# Patient Record
Sex: MALE | Race: WHITE | NOT HISPANIC OR LATINO | Employment: FULL TIME | URBAN - METROPOLITAN AREA
[De-identification: names, ages, dates, MRNs, and addresses within clinical notes are randomized per-mention and may not be internally consistent; named-entity substitution may affect disease eponyms.]

---

## 2023-08-29 ENCOUNTER — HOSPITAL ENCOUNTER (OUTPATIENT)
Dept: DATA CONVERSION | Facility: HOSPITAL | Age: 63
Discharge: HOME | End: 2023-08-29
Payer: COMMERCIAL

## 2023-08-29 ENCOUNTER — HOSPITAL ENCOUNTER (OUTPATIENT)
Dept: DATA CONVERSION | Facility: HOSPITAL | Age: 63
End: 2023-08-29

## 2023-08-29 DIAGNOSIS — I25.119 ATHEROSCLEROTIC HEART DISEASE OF NATIVE CORONARY ARTERY WITH UNSPECIFIED ANGINA PECTORIS (CMS-HCC): ICD-10-CM

## 2023-08-29 LAB
ABO + RH BLD: NORMAL
ALBUMIN SERPL-MCNC: 4.5 GM/DL (ref 3.5–5)
ALBUMIN/GLOB SERPL: 1.5 RATIO (ref 1.5–3)
ALP BLD-CCNC: 64 U/L (ref 35–125)
ALT SERPL-CCNC: 15 U/L (ref 5–40)
ANION GAP SERPL CALCULATED.3IONS-SCNC: 12 MMOL/L (ref 0–19)
ANTICOAGULANT: NORMAL
APTT PPP: 27.2 SEC (ref 22–32.5)
AST SERPL-CCNC: 21 U/L (ref 5–40)
BACTERIA UR QL AUTO: NEGATIVE
BILIRUB SERPL-MCNC: 0.5 MG/DL (ref 0.1–1.2)
BILIRUB UR QL STRIP.AUTO: NEGATIVE
BLD GP AB SCN SERPL QL: NEGATIVE
BLD PROD TYP BPU: NORMAL
BPU ID: NORMAL
BPU ID: NORMAL
BUN SERPL-MCNC: 12 MG/DL (ref 8–25)
BUN/CREAT SERPL: 17.1 RATIO (ref 8–21)
CALCIUM SERPL-MCNC: 9.7 MG/DL (ref 8.5–10.4)
CHLORIDE SERPL-SCNC: 105 MMOL/L (ref 97–107)
CLARITY UR: CLEAR
CO2 SERPL-SCNC: 23 MMOL/L (ref 24–31)
COLOR UR: ABNORMAL
CREAT SERPL-MCNC: 0.7 MG/DL (ref 0.4–1.6)
DEPRECATED RDW RBC AUTO: 44.7 FL (ref 37–54)
ERYTHROCYTE [DISTWIDTH] IN BLOOD BY AUTOMATED COUNT: 12 % (ref 11.7–15)
GFR SERPL CREATININE-BSD FRML MDRD: 105 ML/MIN/1.73 M2
GLOBULIN SER-MCNC: 3.1 G/DL (ref 1.9–3.7)
GLUCOSE SERPL-MCNC: 100 MG/DL (ref 65–99)
GLUCOSE UR STRIP.AUTO-MCNC: NEGATIVE MG/DL
HBA1C MFR BLD: 5.4 % (ref 4–6)
HCT VFR BLD AUTO: 46.4 % (ref 41–50)
HGB BLD-MCNC: 15.6 GM/DL (ref 13.5–16.5)
HGB UR QL STRIP.AUTO: ABNORMAL /HPF (ref 0–3)
HGB UR QL: NEGATIVE
HX OF BLOOD TRANSFUSION: NORMAL
HYALINE CASTS UR QL AUTO: ABNORMAL /LPF
INR PPP: 1 (ref 0.86–1.16)
KETONES UR QL STRIP.AUTO: NEGATIVE
LEUKOCYTE ESTERASE UR QL STRIP.AUTO: NEGATIVE
MAJ XM SERPL-IMP: NORMAL
MAJ XM SERPL-IMP: NORMAL
MCH RBC QN AUTO: 33.6 PG (ref 26–34)
MCHC RBC AUTO-ENTMCNC: 33.6 % (ref 31–37)
MCV RBC AUTO: 100 FL (ref 80–100)
MICROSCOPIC (UA): ABNORMAL
MRSA DNA SPEC QL NAA+PROBE: NEGATIVE
NITRITE UR QL STRIP.AUTO: NEGATIVE
NRBC BLD-RTO: 0 /100 WBC
NUM BPU REQUESTED: 2
PH UR STRIP.AUTO: 7.5 [PH] (ref 4.6–8)
PLATELET # BLD AUTO: 221 K/UL (ref 150–450)
PMV BLD AUTO: 10.1 CU (ref 7–12.6)
POTASSIUM SERPL-SCNC: 5.1 MMOL/L (ref 3.4–5.1)
PROT SERPL-MCNC: 7.6 G/DL (ref 5.9–7.9)
PROT UR STRIP.AUTO-MCNC: ABNORMAL MG/DL
PROTHROMBIN TIME: 10.7 SEC (ref 9.3–12.7)
RBC # BLD AUTO: 4.64 M/UL (ref 4.5–5.5)
SODIUM SERPL-SCNC: 140 MMOL/L (ref 133–145)
SP GR UR STRIP.AUTO: 1.02 (ref 1–1.03)
SPECIMEN EXP DATE BLD: NORMAL
SPECIMEN SOURCE: NORMAL
SQUAMOUS UR QL AUTO: ABNORMAL /HPF
STATUS OF UNIT: NORMAL
STATUS OF UNIT: NORMAL
SURGERY DATE: NORMAL
TEST ORDERED: NORMAL
TRANSF BAND NUM PATIENT: 1110
TRANSFUSION STATUS: NORMAL
TRANSFUSION STATUS: NORMAL
UNIT DIVISION: 0
UNIT DIVISION: 0
URINE CULTURE: ABNORMAL
UROBILINOGEN UR QL STRIP.AUTO: NORMAL MG/DL (ref 0–1)
WBC # BLD AUTO: 8.5 K/UL (ref 4.5–11)
WBC #/AREA URNS AUTO: ABNORMAL /HPF (ref 0–3)

## 2023-09-04 PROBLEM — I25.10 CORONARY ATHEROSCLEROSIS: Status: ACTIVE | Noted: 2023-09-04

## 2023-09-04 PROBLEM — I25.10 CORONARY ATHEROSCLEROSIS OF NATIVE CORONARY ARTERY: Status: ACTIVE | Noted: 2023-09-04

## 2023-09-04 PROBLEM — E78.5 HYPERLIPIDEMIA: Status: ACTIVE | Noted: 2023-09-04

## 2023-09-04 PROBLEM — F17.200 TOBACCO DEPENDENCE: Status: ACTIVE | Noted: 2023-09-04

## 2023-09-04 PROBLEM — I11.9 BENIGN HYPERTENSIVE HEART DISEASE WITHOUT CONGESTIVE HEART FAILURE: Status: ACTIVE | Noted: 2023-09-04

## 2023-09-04 PROBLEM — Z98.61 POSTSURGICAL PERCUTANEOUS TRANSLUMINAL CORONARY ANGIOPLASTY STATUS: Status: ACTIVE | Noted: 2023-09-04

## 2023-09-04 RX ORDER — ASPIRIN 325 MG
325 TABLET ORAL DAILY
COMMUNITY
End: 2023-10-31 | Stop reason: ALTCHOICE

## 2023-09-04 RX ORDER — NAPROXEN 500 MG/1
500 TABLET ORAL EVERY 12 HOURS
COMMUNITY
End: 2023-10-31 | Stop reason: ALTCHOICE

## 2023-09-04 RX ORDER — TRAMADOL HYDROCHLORIDE 50 MG/1
50 TABLET ORAL EVERY 6 HOURS PRN
COMMUNITY

## 2023-09-04 RX ORDER — IBUPROFEN 800 MG/1
800 TABLET ORAL 3 TIMES DAILY PRN
COMMUNITY
Start: 2018-07-09 | End: 2023-10-31 | Stop reason: ALTCHOICE

## 2023-09-04 RX ORDER — METHOCARBAMOL 500 MG/1
500 TABLET, FILM COATED ORAL 3 TIMES DAILY PRN
COMMUNITY
End: 2023-10-31 | Stop reason: ALTCHOICE

## 2023-09-30 PROBLEM — E78.5 HYPERLIPIDEMIA: Status: ACTIVE | Noted: 2023-09-30

## 2023-09-30 PROBLEM — I10 ESSENTIAL HYPERTENSION: Status: ACTIVE | Noted: 2023-09-30

## 2023-09-30 PROBLEM — I20.9 ANGINA PECTORIS (CMS-HCC): Status: ACTIVE | Noted: 2023-09-30

## 2023-09-30 PROBLEM — I25.10 CORONARY ARTERY DISEASE: Status: ACTIVE | Noted: 2023-09-30

## 2023-09-30 PROBLEM — I50.9 CONGESTIVE HEART FAILURE (MULTI): Status: ACTIVE | Noted: 2023-09-30

## 2023-10-05 DIAGNOSIS — Z95.1 S/P CABG (CORONARY ARTERY BYPASS GRAFT): Primary | ICD-10-CM

## 2023-10-06 ENCOUNTER — TELEPHONE (OUTPATIENT)
Dept: CARDIAC SURGERY | Facility: CLINIC | Age: 63
End: 2023-10-06
Payer: COMMERCIAL

## 2023-10-06 NOTE — TELEPHONE ENCOUNTER
Patient scheduled for appointment with Dr. Bull Travis 10/10/23 1300; EKG 10/9/23 1300; cxr anytime at [facility], [place]. Pt aware. Discussed verbally.

## 2023-10-09 ENCOUNTER — HOSPITAL ENCOUNTER (OUTPATIENT)
Dept: RADIOLOGY | Facility: HOSPITAL | Age: 63
Discharge: HOME | End: 2023-10-09
Payer: COMMERCIAL

## 2023-10-09 ENCOUNTER — HOSPITAL ENCOUNTER (OUTPATIENT)
Dept: CARDIOLOGY | Facility: HOSPITAL | Age: 63
Discharge: HOME | End: 2023-10-09
Payer: COMMERCIAL

## 2023-10-09 DIAGNOSIS — R93.89 ABNORMAL X-RAY: Primary | ICD-10-CM

## 2023-10-09 DIAGNOSIS — Z95.1 S/P CABG (CORONARY ARTERY BYPASS GRAFT): ICD-10-CM

## 2023-10-09 PROCEDURE — 71046 X-RAY EXAM CHEST 2 VIEWS: CPT | Mod: FY

## 2023-10-09 PROCEDURE — 93005 ELECTROCARDIOGRAM TRACING: CPT

## 2023-10-09 RX ORDER — POTASSIUM CHLORIDE 20 MEQ/1
TABLET, EXTENDED RELEASE ORAL
COMMUNITY
Start: 2023-09-12 | End: 2023-10-31 | Stop reason: ALTCHOICE

## 2023-10-09 RX ORDER — NITROGLYCERIN 0.4 MG/1
0.4 TABLET SUBLINGUAL EVERY 5 MIN PRN
COMMUNITY
Start: 2023-07-08 | End: 2023-10-31 | Stop reason: SDUPTHER

## 2023-10-09 RX ORDER — AMIODARONE HYDROCHLORIDE 200 MG/1
200 TABLET ORAL DAILY
COMMUNITY
Start: 2023-09-12 | End: 2023-10-12 | Stop reason: SDUPTHER

## 2023-10-09 RX ORDER — DM/P-EPHED/ACETAMINOPH/DOXYLAM 30-7.5/3
2 LIQUID (ML) ORAL
COMMUNITY
Start: 2023-08-02 | End: 2024-03-26 | Stop reason: WASHOUT

## 2023-10-09 RX ORDER — IBUPROFEN 200 MG
TABLET ORAL EVERY 24 HOURS
COMMUNITY
Start: 2023-07-08 | End: 2023-10-31 | Stop reason: ALTCHOICE

## 2023-10-09 RX ORDER — FUROSEMIDE 20 MG/1
20 TABLET ORAL DAILY
COMMUNITY
Start: 2023-09-12 | End: 2023-10-31 | Stop reason: ALTCHOICE

## 2023-10-09 RX ORDER — CHLORHEXIDINE GLUCONATE ORAL RINSE 1.2 MG/ML
SOLUTION DENTAL
COMMUNITY
Start: 2023-08-29 | End: 2023-10-31 | Stop reason: ALTCHOICE

## 2023-10-09 RX ORDER — LOSARTAN POTASSIUM 25 MG/1
25 TABLET ORAL 2 TIMES DAILY
COMMUNITY
End: 2023-10-31 | Stop reason: SDUPTHER

## 2023-10-09 RX ORDER — TICAGRELOR 90 MG/1
90 TABLET ORAL 2 TIMES DAILY
COMMUNITY
End: 2023-10-31 | Stop reason: SDUPTHER

## 2023-10-09 RX ORDER — ASPIRIN 81 MG/1
81 TABLET ORAL DAILY
COMMUNITY
Start: 2023-10-06 | End: 2023-10-31 | Stop reason: SDUPTHER

## 2023-10-09 RX ORDER — METOPROLOL SUCCINATE 25 MG/1
50 TABLET, EXTENDED RELEASE ORAL EVERY 24 HOURS
COMMUNITY
End: 2023-10-12 | Stop reason: SDUPTHER

## 2023-10-09 RX ORDER — ATORVASTATIN CALCIUM 80 MG/1
80 TABLET, FILM COATED ORAL DAILY
COMMUNITY
End: 2023-10-31 | Stop reason: SDUPTHER

## 2023-10-09 RX ORDER — OXYCODONE HYDROCHLORIDE 5 MG/1
5 TABLET ORAL EVERY 6 HOURS PRN
COMMUNITY
Start: 2023-09-12 | End: 2023-10-31 | Stop reason: ALTCHOICE

## 2023-10-10 ENCOUNTER — OFFICE VISIT (OUTPATIENT)
Dept: CARDIAC SURGERY | Facility: CLINIC | Age: 63
End: 2023-10-10
Payer: COMMERCIAL

## 2023-10-10 ENCOUNTER — HOSPITAL ENCOUNTER (OUTPATIENT)
Dept: RADIOLOGY | Facility: HOSPITAL | Age: 63
Discharge: HOME | End: 2023-10-10
Payer: COMMERCIAL

## 2023-10-10 VITALS
WEIGHT: 136 LBS | BODY MASS INDEX: 21.35 KG/M2 | OXYGEN SATURATION: 99 % | SYSTOLIC BLOOD PRESSURE: 122 MMHG | RESPIRATION RATE: 18 BRPM | HEIGHT: 67 IN | HEART RATE: 90 BPM | DIASTOLIC BLOOD PRESSURE: 70 MMHG

## 2023-10-10 DIAGNOSIS — I70.90 ARTERIOSCLEROSIS: Primary | ICD-10-CM

## 2023-10-10 DIAGNOSIS — R93.89 ABNORMAL X-RAY: ICD-10-CM

## 2023-10-10 PROCEDURE — 99213 OFFICE O/P EST LOW 20 MIN: CPT | Mod: ZK | Performed by: NURSE PRACTITIONER

## 2023-10-10 PROCEDURE — 71046 X-RAY EXAM CHEST 2 VIEWS: CPT | Mod: FY

## 2023-10-10 PROCEDURE — 99213 OFFICE O/P EST LOW 20 MIN: CPT | Performed by: NURSE PRACTITIONER

## 2023-10-10 PROCEDURE — 1036F TOBACCO NON-USER: CPT | Performed by: NURSE PRACTITIONER

## 2023-10-10 ASSESSMENT — LIFESTYLE VARIABLES
HOW OFTEN DO YOU HAVE A DRINK CONTAINING ALCOHOL: 2-3 TIMES A WEEK
AUDIT-C TOTAL SCORE: 3
SKIP TO QUESTIONS 9-10: 1
HAS A RELATIVE, FRIEND, DOCTOR, OR ANOTHER HEALTH PROFESSIONAL EXPRESSED CONCERN ABOUT YOUR DRINKING OR SUGGESTED YOU CUT DOWN: NO
HOW OFTEN DO YOU HAVE SIX OR MORE DRINKS ON ONE OCCASION: NEVER
HOW MANY STANDARD DRINKS CONTAINING ALCOHOL DO YOU HAVE ON A TYPICAL DAY: 1 OR 2
AUDIT TOTAL SCORE: 3
HAVE YOU OR SOMEONE ELSE BEEN INJURED AS A RESULT OF YOUR DRINKING: NO

## 2023-10-10 ASSESSMENT — PATIENT HEALTH QUESTIONNAIRE - PHQ9
SUM OF ALL RESPONSES TO PHQ9 QUESTIONS 1 & 2: 0
2. FEELING DOWN, DEPRESSED OR HOPELESS: NOT AT ALL
1. LITTLE INTEREST OR PLEASURE IN DOING THINGS: NOT AT ALL

## 2023-10-10 ASSESSMENT — ENCOUNTER SYMPTOMS
DEPRESSION: 0
LOSS OF SENSATION IN FEET: 0
OCCASIONAL FEELINGS OF UNSTEADINESS: 0

## 2023-10-10 NOTE — PROGRESS NOTES
"Routine Follow up    HPI:   Mr. Woodall is a 61 yo male who originally presented with severe substernal crushing chest pain accompanied by diaphoresis. He called 911 and brought to [facility]. He was diagnosed with an inferior STEMI. Cardiac cath revealed multivessel disease. A ANISH was placed to the RCA. DAPT was started. Echo showed an EF 35 to 40%. On 9/7/23 underwent MidCAB x2 with LIMA to LAD, FRIMA Y from LIMA to OM. His postop course was unremarkable and discharged on POD #5.     Past Medical History:   Diagnosis Date    Coronary artery disease     High blood cholesterol     HTN (hypertension)     MI (myocardial infarction) (CMS/[facility]) 2010    stents placed by Dr. Davila    MI (myocardial infarction) (CMS/[facility])     stents x2 by Dr. Salazar     Past Surgical History:   Procedure Laterality Date    CORONARY ARTERY BYPASS GRAFT      MINI cabg x2    ORTHOPEDIC SURGERY      right ankle repair    ORTHOPEDIC SURGERY      right arm repair     Family History   Problem Relation Name Age of Onset    Other (old age) Mother      Other (cabg x3) Father      Prostate cancer Father           Objective   Well appearing, in good spirits    Physical Exam  General: no distress.  /70   Pulse 90   Resp 18   Ht 1.702 m (5' 7\")   Wt 61.7 kg (136 lb)   SpO2 99%   BMI 21.30 kg/m²    Body mass index is 21.3 kg/m².   HEENT: Normocephalic and atraumatic. PERRLA. EOMs are full.   NECK: Supple without thyromegaly, masses, or carotid bruits.  CHEST: Clear.  HEART: Regular rate and rhythm.  ABDOMEN: Soft, flat, nontender without organomegaly or masses.  NEUROLOGIC: Unremarkable.  EXTREMITIES: Unremarkable. Pedal pulses are palpable.  MidCAB incision healing well    Data Review: CXR:  normal      Assessment/Plan   Overall feeling well. Follow up PRN with our service. Long term follow up with cards and IM.     Problem List Items Addressed This Visit    None      No orders of the defined types were placed in this encounter.    "

## 2023-10-12 DIAGNOSIS — I11.9 BENIGN HYPERTENSIVE HEART DISEASE WITHOUT CONGESTIVE HEART FAILURE: ICD-10-CM

## 2023-10-12 DIAGNOSIS — I48.91 ATRIAL FIBRILLATION, UNSPECIFIED TYPE (MULTI): ICD-10-CM

## 2023-10-12 RX ORDER — METOPROLOL SUCCINATE 50 MG/1
50 TABLET, EXTENDED RELEASE ORAL DAILY
Qty: 90 TABLET | Refills: 2 | Status: SHIPPED | OUTPATIENT
Start: 2023-10-12 | End: 2023-10-31 | Stop reason: SDUPTHER

## 2023-10-12 RX ORDER — AMIODARONE HYDROCHLORIDE 200 MG/1
200 TABLET ORAL DAILY
Qty: 90 TABLET | Refills: 2 | Status: SHIPPED | OUTPATIENT
Start: 2023-10-12 | End: 2023-10-31 | Stop reason: SDUPTHER

## 2023-10-12 NOTE — TELEPHONE ENCOUNTER
Requested Prescriptions     Pending Prescriptions Disp Refills    amiodarone (Pacerone) 200 mg tablet 90 tablet 2     Sig: Take 1 tablet (200 mg) by mouth once daily.    metoprolol succinate XL (Toprol-XL) 50 mg 24 hr tablet 90 tablet 2     Sig: Take 1 tablet (50 mg) by mouth once daily.     Please send the attached prescription for the patient. They were just seen in the hospital. Thank you.

## 2023-10-30 PROBLEM — F10.20 ALCOHOL DEPENDENCE (MULTI): Status: ACTIVE | Noted: 2023-10-30

## 2023-10-30 PROBLEM — I50.22 CHRONIC SYSTOLIC HEART FAILURE (MULTI): Status: ACTIVE | Noted: 2023-10-30

## 2023-10-30 PROBLEM — Z98.61 CORONARY ANGIOPLASTY STATUS: Status: ACTIVE | Noted: 2023-07-07

## 2023-10-30 PROBLEM — R07.9 CHEST PAIN: Status: ACTIVE | Noted: 2023-09-30
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
## 2023-10-30 PROBLEM — R40.1 CLOUDED CONSCIOUSNESS: Status: ACTIVE | Noted: 2023-10-30

## 2023-10-30 PROBLEM — S39.92XA BACK INJURY: Status: ACTIVE | Noted: 2023-10-30

## 2023-10-30 PROBLEM — S22.39XA FRACTURE OF RIB: Status: ACTIVE | Noted: 2023-10-30

## 2023-10-30 PROBLEM — I25.10 CAD (CORONARY ARTERY DISEASE): Status: ACTIVE | Noted: 2023-10-30

## 2023-10-30 PROBLEM — Z72.0 TOBACCO USE: Status: ACTIVE | Noted: 2023-07-31

## 2023-10-30 PROBLEM — I21.9 ACUTE MYOCARDIAL INFARCTION (MULTI): Status: ACTIVE | Noted: 2023-07-07

## 2023-10-30 PROBLEM — M54.50 ACUTE LOW BACK PAIN: Status: ACTIVE | Noted: 2023-10-30

## 2023-10-30 PROBLEM — R10.9 ABDOMINAL PAIN: Status: ACTIVE | Noted: 2023-10-30

## 2023-10-30 PROBLEM — T82.855A STENOSIS OF CORONARY ARTERY STENT: Status: ACTIVE | Noted: 2023-07-07

## 2023-10-30 PROBLEM — I25.10 CORONARY ARTERIOSCLEROSIS IN NATIVE ARTERY: Status: ACTIVE | Noted: 2023-07-07

## 2023-10-30 PROBLEM — I47.20 VT (VENTRICULAR TACHYCARDIA) (MULTI): Status: ACTIVE | Noted: 2023-07-07

## 2023-10-30 PROBLEM — I77.1 STRICTURE OF ARTERY (CMS-HCC): Status: ACTIVE | Noted: 2023-08-29

## 2023-10-30 PROBLEM — R68.89 IMPAIRED PERFUSION OF PERIPHERAL TISSUE: Status: ACTIVE | Noted: 2023-10-30

## 2023-10-30 PROBLEM — S81.819A LACERATION OF LOWER EXTREMITY: Status: ACTIVE | Noted: 2023-10-30

## 2023-10-30 PROBLEM — I48.91 ATRIAL FIBRILLATION (MULTI): Status: ACTIVE | Noted: 2023-10-30

## 2023-10-31 ENCOUNTER — OFFICE VISIT (OUTPATIENT)
Dept: CARDIOLOGY | Facility: CLINIC | Age: 63
End: 2023-10-31
Payer: COMMERCIAL

## 2023-10-31 VITALS
HEART RATE: 83 BPM | OXYGEN SATURATION: 97 % | BODY MASS INDEX: 20.88 KG/M2 | SYSTOLIC BLOOD PRESSURE: 137 MMHG | RESPIRATION RATE: 18 BRPM | WEIGHT: 133 LBS | DIASTOLIC BLOOD PRESSURE: 74 MMHG | HEIGHT: 67 IN

## 2023-10-31 DIAGNOSIS — R07.2 PRECORDIAL PAIN: ICD-10-CM

## 2023-10-31 DIAGNOSIS — I48.91 ATRIAL FIBRILLATION, UNSPECIFIED TYPE (MULTI): ICD-10-CM

## 2023-10-31 DIAGNOSIS — I25.10 CORONARY ARTERY DISEASE INVOLVING NATIVE CORONARY ARTERY OF NATIVE HEART WITHOUT ANGINA PECTORIS: Primary | ICD-10-CM

## 2023-10-31 DIAGNOSIS — I11.9 BENIGN HYPERTENSIVE HEART DISEASE WITHOUT CONGESTIVE HEART FAILURE: ICD-10-CM

## 2023-10-31 DIAGNOSIS — I10 ESSENTIAL HYPERTENSION: ICD-10-CM

## 2023-10-31 PROCEDURE — 1036F TOBACCO NON-USER: CPT | Performed by: INTERNAL MEDICINE

## 2023-10-31 PROCEDURE — 3078F DIAST BP <80 MM HG: CPT | Performed by: INTERNAL MEDICINE

## 2023-10-31 PROCEDURE — 3075F SYST BP GE 130 - 139MM HG: CPT | Performed by: INTERNAL MEDICINE

## 2023-10-31 PROCEDURE — 99214 OFFICE O/P EST MOD 30 MIN: CPT | Performed by: INTERNAL MEDICINE

## 2023-10-31 RX ORDER — LOSARTAN POTASSIUM 25 MG/1
25 TABLET ORAL DAILY
Qty: 90 TABLET | Refills: 3 | Status: SHIPPED | OUTPATIENT
Start: 2023-10-31 | End: 2024-01-24 | Stop reason: SDUPTHER

## 2023-10-31 RX ORDER — POTASSIUM CHLORIDE 20 MEQ/1
20 TABLET, EXTENDED RELEASE ORAL DAILY
COMMUNITY
End: 2023-10-31 | Stop reason: ALTCHOICE

## 2023-10-31 RX ORDER — ATORVASTATIN CALCIUM 80 MG/1
80 TABLET, FILM COATED ORAL NIGHTLY
Qty: 90 TABLET | Refills: 3 | Status: SHIPPED | OUTPATIENT
Start: 2023-10-31 | End: 2024-10-30

## 2023-10-31 RX ORDER — AMIODARONE HYDROCHLORIDE 200 MG/1
200 TABLET ORAL DAILY
Qty: 90 TABLET | Refills: 0 | Status: SHIPPED | OUTPATIENT
Start: 2023-10-31 | End: 2024-01-24 | Stop reason: ALTCHOICE

## 2023-10-31 RX ORDER — METOPROLOL SUCCINATE 50 MG/1
50 TABLET, EXTENDED RELEASE ORAL DAILY
Qty: 90 TABLET | Refills: 2 | Status: SHIPPED | OUTPATIENT
Start: 2023-10-31 | End: 2024-07-27

## 2023-10-31 RX ORDER — TICAGRELOR 90 MG/1
90 TABLET ORAL 2 TIMES DAILY
Qty: 180 TABLET | Refills: 3 | Status: SHIPPED | OUTPATIENT
Start: 2023-10-31 | End: 2024-10-30

## 2023-10-31 ASSESSMENT — LIFESTYLE VARIABLES
HOW OFTEN DURING THE LAST YEAR HAVE YOU HAD A FEELING OF GUILT OR REMORSE AFTER DRINKING: NEVER
HOW OFTEN DURING THE LAST YEAR HAVE YOU BEEN UNABLE TO REMEMBER WHAT HAPPENED THE NIGHT BEFORE BECAUSE YOU HAD BEEN DRINKING: NEVER
HAVE YOU OR SOMEONE ELSE BEEN INJURED AS A RESULT OF YOUR DRINKING: NO
HOW OFTEN DURING THE LAST YEAR HAVE YOU NEEDED AN ALCOHOLIC DRINK FIRST THING IN THE MORNING TO GET YOURSELF GOING AFTER A NIGHT OF HEAVY DRINKING: NEVER
HOW MANY STANDARD DRINKS CONTAINING ALCOHOL DO YOU HAVE ON A TYPICAL DAY: 1 OR 2
SKIP TO QUESTIONS 9-10: 1
HOW OFTEN DURING THE LAST YEAR HAVE YOU FOUND THAT YOU WERE NOT ABLE TO STOP DRINKING ONCE YOU HAD STARTED: NEVER
AUDIT-C TOTAL SCORE: 4
HOW OFTEN DURING THE LAST YEAR HAVE YOU FAILED TO DO WHAT WAS NORMALLY EXPECTED FROM YOU BECAUSE OF DRINKING: NEVER
HOW OFTEN DO YOU HAVE SIX OR MORE DRINKS ON ONE OCCASION: NEVER
AUDIT TOTAL SCORE: 4
HOW OFTEN DO YOU HAVE A DRINK CONTAINING ALCOHOL: 4 OR MORE TIMES A WEEK
HAS A RELATIVE, FRIEND, DOCTOR, OR ANOTHER HEALTH PROFESSIONAL EXPRESSED CONCERN ABOUT YOUR DRINKING OR SUGGESTED YOU CUT DOWN: NO

## 2023-10-31 ASSESSMENT — PATIENT HEALTH QUESTIONNAIRE - PHQ9
2. FEELING DOWN, DEPRESSED OR HOPELESS: NOT AT ALL
1. LITTLE INTEREST OR PLEASURE IN DOING THINGS: NOT AT ALL
SUM OF ALL RESPONSES TO PHQ9 QUESTIONS 1 AND 2: 0

## 2023-10-31 ASSESSMENT — PAIN SCALES - GENERAL: PAINLEVEL: 0-NO PAIN

## 2023-10-31 NOTE — PROGRESS NOTES
History of present illness:    Patient returns to my office follow-up after recent MIDCAB LIMA to LAD DILLON to circumflex.  Had PCI to RCA in setting of inferior STEMI back in July 7, 2023 and then his MIDCAB was in September 7, 2023.  Postop was complicated by brief episode of atrial fibrillation currently on aspirin Brilinta amiodarone metoprolol and losartan.  EF is slightly reduced.  At this point recommend to continue current medications.  We will see him in 3 months.  Doing overall good.  Denies any chest pain or shortness of breath no palpitations or dizziness.     Past Medical History:   Diagnosis Date    Acute myocardial infarction (CMS/[facility]) 07/07/2023    Alcohol dependence (CMS/[facility]) 10/30/2023    Atrial fibrillation (CMS/[facility]) 10/30/2023    Benign hypertensive heart disease without congestive heart failure 09/04/2023    Congestive heart failure (CMS/[facility]) 09/30/2023    Coronary arteriosclerosis in native artery 07/07/2023    Coronary artery disease     Essential hypertension 09/30/2023    High blood cholesterol     HTN (hypertension)     Hyperlipidemia 09/04/2023    Impaired perfusion of peripheral tissue 10/30/2023    MI (myocardial infarction) (CMS/[facility]) 2010    stents placed by Dr. Davila    MI (myocardial infarction) (CMS/[facility])     stents x2 by Dr. Salazar    Postsurgical percutaneous transluminal coronary angioplasty status 09/04/2023    Stenosis of coronary artery stent 07/07/2023    Stricture of artery (CMS/[facility]) 08/29/2023    Tobacco dependence 09/04/2023    VT (ventricular tachycardia) (CMS/[facility]) 07/07/2023       Past Surgical History:   Procedure Laterality Date    CORONARY ANGIOPLASTY WITH STENT PLACEMENT      Dr Davila - 2 stents    CORONARY ANGIOPLASTY WITH STENT PLACEMENT      Dr Salazar - 2 stents    CORONARY ARTERY BYPASS GRAFT      MINI cabg x2    ORTHOPEDIC SURGERY      right ankle repair    ORTHOPEDIC SURGERY      right arm repair       No Known Allergies     reports that he has quit smoking. His  smoking use included cigarettes. He has a 52.50 pack-year smoking history. He has been exposed to tobacco smoke. He has never used smokeless tobacco. He reports current alcohol use of about 6.0 - 7.0 standard drinks of alcohol per week. He reports that he does not use drugs.    Family History   Problem Relation Name Age of Onset    Other (old age) Mother      Other (cabg x3) Father      Prostate cancer Father         Patient's Medications   New Prescriptions    No medications on file   Previous Medications    ASPIRIN 81 MG EC TABLET    TAKE 1 TABLET BY MOUTH DAILY HOLD IF GIVEN IN LAST 24 HOURS OR IF HISTORY OF HYPERSENSITIVITY.    METOPROLOL SUCCINATE XL (TOPROL-XL) 25 MG 24 HR TABLET    TAKE 1 TABLET BY MOUTH DAILY HOLD FOR HEART RATE LESS THAN 55 BPM AND/OR SBP LESS THAN 90    NICOTINE (NICODERM CQ) 21 MG/24 HR PATCH    APPLY 1 PATCH TRANSDERMALLY ONE TIME DAILY    NICOTINE POLACRILEX (COMMIT) 2 MG LOZENGE    Place 1 lozenge (2 mg) into mouth between cheek and gum.    NITROGLYCERIN (NITROSTAT) 0.4 MG SL TABLET    TAKE 1 TABLET BY MOUTH SUBLINGUAL AS NEEDED FOR CHEST PAIN GIVE UP TO 3 DOSES HOLD IF SBP LESS THAN 90. NOTIFY PHYSICIAN    TRAMADOL (ULTRAM) 50 MG TABLET    Take 1 tablet (50 mg) by mouth every 6 hours if needed.   Modified Medications    Modified Medication Previous Medication    AMIODARONE (PACERONE) 200 MG TABLET amiodarone (Pacerone) 200 mg tablet       Take 1 tablet (200 mg) by mouth once daily.    Take 1 tablet (200 mg) by mouth once daily.    ATORVASTATIN (LIPITOR) 80 MG TABLET atorvastatin (Lipitor) 80 mg tablet       Take 1 tablet (80 mg) by mouth once daily at bedtime.    TAKE 1 TABLET BY MOUTH EVERY NIGHT AT BEDTIME    BRILINTA 90 MG TABLET Brilinta 90 mg tablet       Take 1 tablet (90 mg) by mouth 2 times a day.    Take 1 tablet (90 mg) by mouth 2 times a day.    LOSARTAN (COZAAR) 25 MG TABLET losartan (Cozaar) 25 mg tablet       TAKE 1 TABLET BY MOUTH ONE TIME DAILY    TAKE 1 TABLET BY  MOUTH ONE TIME DAILY    METOPROLOL SUCCINATE XL (TOPROL-XL) 50 MG 24 HR TABLET metoprolol succinate XL (Toprol-XL) 50 mg 24 hr tablet       Take 1 tablet (50 mg) by mouth once daily.    Take 1 tablet (50 mg) by mouth once daily.   Discontinued Medications    ASPIRIN 325 MG TABLET    Take 1 tablet (325 mg) by mouth once daily.    ASPIRIN 81 MG EC TABLET    Take 1 tablet (81 mg) by mouth once daily.    ATORVASTATIN (LIPITOR) 80 MG TABLET    Take 1 tablet (80 mg) by mouth once daily.    CHLORHEXIDINE (PERIDEX) 0.12 % SOLUTION    USE THE CAP ON THE MOUTHWASH BOTTLE TO MEASURE 15ML OF MOUTHWASH. SWISH AND GARGLE THE MOUTHWASH FOR 30 SECONDS THEN SPIT OUT THE MOUTHWASH.    FUROSEMIDE (LASIX) 20 MG TABLET    Take 1 tablet (20 mg) by mouth once daily.    IBUPROFEN 800 MG TABLET    Take 1 tablet (800 mg) by mouth 3 times a day as needed.  1 tablet with food or milk as needed Orally Three times a day    LOSARTAN (COZAAR) 25 MG TABLET    Take 1 tablet (25 mg) by mouth 2 times a day.    METHOCARBAMOL (ROBAXIN) 500 MG TABLET    Take 1 tablet (500 mg) by mouth 3 times a day as needed.    NAPROXEN (NAPROSYN) 500 MG TABLET    Take 1 tablet (500 mg) by mouth every 12 hours.    NICOTINE (NICODERM CQ) 21 MG/24 HR PATCH    once every 24 hours.    NITROGLYCERIN (NITROSTAT) 0.4 MG SL TABLET    Place 1 tablet (0.4 mg) under the tongue every 5 minutes if needed.    OXYCODONE (ROXICODONE) 5 MG IMMEDIATE RELEASE TABLET    Take 1 tablet (5 mg) by mouth every 6 hours if needed.    POTASSIUM CHLORIDE CR 20 MEQ ER TABLET    TAKE ONE TABLET BY MOUTH DAILY WITH BREAKFAST WITH FULL GLASS OF WATER. GIVE WITH FOOD OR A MEAL    POTASSIUM CHLORIDE CR 20 MEQ ER TABLET    Take 1 tablet (20 mEq) by mouth once daily. Do not crush or chew.    TICAGRELOR (BRILINTA) 90 MG TABLET    1 tablet (90 mg). Brilinta 90 MG       Objective   Physical Exam  General: Patient in no acute distress   HEENT: Atraumatic normocephalic.  Neck: Supple, jugular venous pressure  within normal limit.  No bruits  Lungs: Clear to auscultation bilaterally  Cardiovascular: Regular rate and rhythm, normal heart sounds, no murmurs rubs or gallops  Abdomen: Soft nontender nondistended.  Normal bowel sounds.  Extremities: Warm to touch, no edema.      Lab Review   No visits with results within 2 Month(s) from this visit.   Latest known visit with results is:   Hospital Outpatient Visit on 08/29/2023   Component Date Value    WBC 08/29/2023 8.5     RBC 08/29/2023 4.64     Hemoglobin 08/29/2023 15.6     Hematocrit 08/29/2023 46.4     MCV 08/29/2023 100.0     MCH 08/29/2023 33.6     MCHC 08/29/2023 33.6     RDW-SD 08/29/2023 44.7     RDW-CV 08/29/2023 12.0     Platelets 08/29/2023 221     MPV 08/29/2023 10.1     nRBC 08/29/2023 0     Calcium 08/29/2023 9.7     AST 08/29/2023 21     Alkaline Phosphatase 08/29/2023 64     Total Bilirubin 08/29/2023 0.5     Total Protein 08/29/2023 7.6     Albumin 08/29/2023 4.5     Globulin, Total 08/29/2023 3.1     A/G Ratio 08/29/2023 1.5     Sodium 08/29/2023 140     Potassium 08/29/2023 5.1     Chloride 08/29/2023 105     Bicarbonate 08/29/2023 23 (L)     Anion Gap 08/29/2023 12     Urea Nitrogen 08/29/2023 12     Creatinine 08/29/2023 0.7     Urea Nitrogen/Creatinine* 08/29/2023 17.1     Glucose 08/29/2023 100 (H)     ALT (SGPT) 08/29/2023 15     ESTIMATED GFR 08/29/2023 105     Hemoglobin A1C 08/29/2023 5.4     Anticoagulant 08/29/2023 INFORMATION NOT REPORTED TO LABORATORY     Protime 08/29/2023 10.7     INR 08/29/2023 1.0     aPTT 08/29/2023 27.2     Microscopic 08/29/2023 AUTOMATIC MICROSCOPIC URINES     WBC, Urine 08/29/2023 NONE SEEN     RBC, Urine 08/29/2023 NONE SEEN     Bacteria, Urine 08/29/2023 NEGATIVE     Squamous Epithelial Cell* 08/29/2023 NONE SEEN     Hyaline Casts, Urine 08/29/2023 NONE SEEN     Color, Urine 08/29/2023 PALE YELLOW     Appearance, Urine 08/29/2023 CLEAR     Specific Gravity, Urine 08/29/2023 1.021     pH, Urine 08/29/2023 7.5      Leukocyte Esterase, Urine 08/29/2023 NEGATIVE     Nitrite, Urine 08/29/2023 NEGATIVE     Protein, Urine 08/29/2023 TRACE (A)     Glucose, Urine 08/29/2023 NEGATIVE     Ketones, Urine 08/29/2023 NEGATIVE     Urobilinogen, Urine 08/29/2023 NORMAL     Bilirubin, Urine 08/29/2023 NEGATIVE     Blood, Urine 08/29/2023 NEGATIVE     Urine Culture 08/29/2023                      Value:CULTURE NOT INDICATED  Performed at [address] [address]      COMPONENT CODE 08/29/2023 RED CELL GROUP     Units Ordered 08/29/2023 2     Specimen Expiration 08/29/2023 09/10/2023     ABO GROUP (TYPE) IN BLOOD 08/29/2023 A  POSITIVE     ANTIBODY SCREEN 08/29/2023 NEGATIVE     Arm Band Number 08/29/2023 [id number]     Surgery Date 08/29/2023 9/5/23 WEST     Test Ordered 08/29/2023 TYPE AND SCREEN     Pt Transfusion History 08/29/2023                      Value:BLOOD BANK RECORD SEARCH COMPLETED  NO PREVIOUS RECORD  NO PREVIOUS   TRANSFUSIONS IN LIFETIME Performed at [address] [address]      Unit Number 08/29/2023 [id number]     COMPONENT CODE 08/29/2023 LEUKO DEP RED CELLS ADSOL     Unit Division 08/29/2023 0     Status Of Unit 08/29/2023 REL FROM ALLOC     Transfusion Status 08/29/2023 OK TO TRANSFUSE     Crossmatch 08/29/2023 EXM Compatible     Unit Number 08/29/2023 [id number]     COMPONENT CODE 08/29/2023 LEUKO DEP RED CELLS ADSOL     Unit Division 08/29/2023 0     Status Of Unit 08/29/2023 REL FROM ALLOC     Transfusion Status 08/29/2023 OK TO TRANSFUSE     Crossmatch 08/29/2023 EXM Compatible     Specimen Source 08/29/2023                      Value:NASAL  Performed at [address] [address]      Meth. Resistant Staph By* 08/29/2023 NEGATIVE         Assessment/Plan   Patient Active Problem List   Diagnosis    Benign hypertensive heart disease without congestive heart failure    Coronary arteriosclerosis in native artery    Hyperlipidemia    Postsurgical  percutaneous transluminal coronary angioplasty status    Tobacco dependence    Chest pain    Congestive heart failure (CMS/HCC)    Coronary artery disease    Essential hypertension    Hyperlipidemia    CAD (coronary artery disease)    Impaired perfusion of peripheral tissue    Stenosis of coronary artery stent    Stricture of artery (CMS/HCC)    Back injury    VT (ventricular tachycardia) (CMS/HCC)    Acute low back pain    Acute myocardial infarction (CMS/HCC)    Alcohol dependence (CMS/HCC)    Atrial fibrillation (CMS/HCC)    Chronic systolic heart failure (CMS/HCC)    Clouded consciousness    Coronary angioplasty status    Fall    Fracture of rib    Tobacco use    Laceration of lower extremity    Abdominal pain     Patient returns to my office follow-up after recent MIDCAB LIMA to LAD DILLON to circumflex.  Had PCI to RCA in setting of inferior STEMI back in July 7, 2023 and then his MIDCAB was in September 7, 2023.  Postop was complicated by brief episode of atrial fibrillation currently on aspirin Brilinta amiodarone metoprolol and losartan.  EF is slightly reduced.  At this point recommend to continue current medications.  We will see him in 3 months.  Doing overall good.  Denies any chest pain or shortness of breath no palpitations or dizziness.  We will stop amiodarone in 3 months and then we will do monitor 3 months after we will stop the amiodarone for 2 weeks to rule out any further atrial fibrillation.  We will continue dual antiplatelet therapy for a year at least.  May return to work without restrictions.    MD Silvio Roche MD

## 2023-10-31 NOTE — LETTER
Patient underwent open heart surgery and September 7, 2023.  Doing overall good.  May return to work without restriction November 20, 2023.    Silvio Salazar MD

## 2024-01-23 PROBLEM — R93.89 ABNORMAL X-RAY EXAMINATION: Status: ACTIVE | Noted: 2024-01-23

## 2024-01-23 PROBLEM — I70.90 ARTERIOSCLEROTIC VASCULAR DISEASE: Status: ACTIVE | Noted: 2023-07-07

## 2024-01-24 ENCOUNTER — OFFICE VISIT (OUTPATIENT)
Dept: CARDIOLOGY | Facility: CLINIC | Age: 64
End: 2024-01-24
Payer: COMMERCIAL

## 2024-01-24 VITALS
RESPIRATION RATE: 18 BRPM | TEMPERATURE: 98.9 F | HEART RATE: 70 BPM | OXYGEN SATURATION: 98 % | HEIGHT: 68 IN | WEIGHT: 136.4 LBS | BODY MASS INDEX: 20.67 KG/M2 | DIASTOLIC BLOOD PRESSURE: 68 MMHG | SYSTOLIC BLOOD PRESSURE: 139 MMHG

## 2024-01-24 DIAGNOSIS — E78.2 MIXED HYPERLIPIDEMIA: ICD-10-CM

## 2024-01-24 DIAGNOSIS — I11.9 BENIGN HYPERTENSIVE HEART DISEASE WITHOUT CONGESTIVE HEART FAILURE: ICD-10-CM

## 2024-01-24 DIAGNOSIS — I10 ESSENTIAL HYPERTENSION: ICD-10-CM

## 2024-01-24 DIAGNOSIS — I70.90 ARTERIOSCLEROTIC VASCULAR DISEASE: ICD-10-CM

## 2024-01-24 DIAGNOSIS — I25.10 CORONARY ARTERY DISEASE INVOLVING NATIVE CORONARY ARTERY OF NATIVE HEART WITHOUT ANGINA PECTORIS: Primary | ICD-10-CM

## 2024-01-24 PROCEDURE — 3078F DIAST BP <80 MM HG: CPT | Performed by: INTERNAL MEDICINE

## 2024-01-24 PROCEDURE — 1036F TOBACCO NON-USER: CPT | Performed by: INTERNAL MEDICINE

## 2024-01-24 PROCEDURE — 99214 OFFICE O/P EST MOD 30 MIN: CPT | Performed by: INTERNAL MEDICINE

## 2024-01-24 PROCEDURE — 3075F SYST BP GE 130 - 139MM HG: CPT | Performed by: INTERNAL MEDICINE

## 2024-01-24 RX ORDER — LOSARTAN POTASSIUM 50 MG/1
50 TABLET ORAL DAILY
Qty: 90 TABLET | Refills: 3 | Status: SHIPPED | OUTPATIENT
Start: 2024-01-24 | End: 2025-01-23

## 2024-01-24 RX ORDER — ALBUTEROL SULFATE 90 UG/1
1 AEROSOL, METERED RESPIRATORY (INHALATION) EVERY 4 HOURS PRN
COMMUNITY
Start: 2023-12-04 | End: 2024-03-26 | Stop reason: WASHOUT

## 2024-01-24 ASSESSMENT — PAIN SCALES - GENERAL: PAINLEVEL: 0-NO PAIN

## 2024-01-24 ASSESSMENT — PATIENT HEALTH QUESTIONNAIRE - PHQ9
SUM OF ALL RESPONSES TO PHQ9 QUESTIONS 1 AND 2: 0
1. LITTLE INTEREST OR PLEASURE IN DOING THINGS: NOT AT ALL
2. FEELING DOWN, DEPRESSED OR HOPELESS: NOT AT ALL

## 2024-01-24 NOTE — PROGRESS NOTES
History of present illness:  Patient returns to my office follow-up after MIDCAB LIMA to LAD DILLON to circumflex.  Had PCI to RCA in setting of inferior STEMI back in July 7, 2023 and then his MIDCAB was in September 7, 2023.  Postop was complicated by brief episode of atrial fibrillation currently on aspirin Brilinta amiodarone metoprolol and losartan.  EF is slightly reduced.  Patient's blood pressure elevated today.  Denies having any chest pain, shortness of breath, palpitations, dizziness or vomiting.  Past Medical History:   Diagnosis Date    Acute myocardial infarction (CMS/[facility]) 07/07/2023    Alcohol dependence (CMS/[facility]) 10/30/2023    Atrial fibrillation (CMS/[facility]) 10/30/2023    Benign hypertensive heart disease without congestive heart failure 09/04/2023    Congestive heart failure (CMS/[facility]) 09/30/2023    Coronary arteriosclerosis in native artery 07/07/2023    Coronary artery disease     Essential hypertension 09/30/2023    High blood cholesterol     HTN (hypertension)     Hyperlipidemia 09/04/2023    Impaired perfusion of peripheral tissue 10/30/2023    MI (myocardial infarction) (CMS/[facility]) 2010    stents placed by Dr. Davila    MI (myocardial infarction) (CMS/[facility])     stents x2 by Dr. Salazar    Postsurgical percutaneous transluminal coronary angioplasty status 09/04/2023    Stenosis of coronary artery stent 07/07/2023    Stricture of artery (CMS/[facility]) 08/29/2023    Tobacco dependence 09/04/2023    VT (ventricular tachycardia) (CMS/[facility]) 07/07/2023       Past Surgical History:   Procedure Laterality Date    CORONARY ANGIOPLASTY WITH STENT PLACEMENT      Dr Davila - 2 stents    CORONARY ANGIOPLASTY WITH STENT PLACEMENT      Dr Salazar - 2 stents    CORONARY ARTERY BYPASS GRAFT      MINI cabg x2    ORTHOPEDIC SURGERY      right ankle repair    ORTHOPEDIC SURGERY      right arm repair       No Known Allergies     reports that he has quit smoking. His smoking use included cigarettes. He has a 52.50 pack-year smoking  history. He has been exposed to tobacco smoke. He has never used smokeless tobacco. He reports current alcohol use of about 6.0 - 7.0 standard drinks of alcohol per week. He reports that he does not use drugs.    Family History   Problem Relation Name Age of Onset    Other (old age) Mother      Other (cabg x3) Father      Prostate cancer Father         Patient's Medications   New Prescriptions    No medications on file   Previous Medications    ALBUTEROL 90 MCG/ACTUATION INHALER    Inhale 1 puff every 4 hours if needed for wheezing or shortness of breath.    AMIODARONE (PACERONE) 200 MG TABLET    Take 1 tablet (200 mg) by mouth once daily.    ASPIRIN 81 MG EC TABLET    TAKE 1 TABLET BY MOUTH DAILY HOLD IF GIVEN IN LAST 24 HOURS OR IF HISTORY OF HYPERSENSITIVITY.    ATORVASTATIN (LIPITOR) 80 MG TABLET    Take 1 tablet (80 mg) by mouth once daily at bedtime.    BRILINTA 90 MG TABLET    Take 1 tablet (90 mg) by mouth 2 times a day.    LOSARTAN (COZAAR) 25 MG TABLET    TAKE 1 TABLET BY MOUTH ONE TIME DAILY    METOPROLOL SUCCINATE XL (TOPROL-XL) 50 MG 24 HR TABLET    Take 1 tablet (50 mg) by mouth once daily.    NICOTINE (NICODERM CQ) 21 MG/24 HR PATCH    APPLY 1 PATCH TRANSDERMALLY ONE TIME DAILY    NICOTINE POLACRILEX (COMMIT) 2 MG LOZENGE    Place 1 lozenge (2 mg) into mouth between cheek and gum.    NITROGLYCERIN (NITROSTAT) 0.4 MG SL TABLET    TAKE 1 TABLET BY MOUTH SUBLINGUAL AS NEEDED FOR CHEST PAIN GIVE UP TO 3 DOSES HOLD IF SBP LESS THAN 90. NOTIFY PHYSICIAN    TRAMADOL (ULTRAM) 50 MG TABLET    Take 1 tablet (50 mg) by mouth every 6 hours if needed.   Modified Medications    No medications on file   Discontinued Medications    No medications on file       Objective   Physical Exam  General: Patient in no acute distress   HEENT: Atraumatic normocephalic.  Neck: Supple, jugular venous pressure within normal limit.  No bruits  Lungs: Clear to auscultation bilaterally  Cardiovascular: Regular rate and rhythm, normal  heart sounds, no murmurs rubs or gallops  Abdomen: Soft nontender nondistended.  Normal bowel sounds.  Extremities: Warm to touch, no edema.        Lab Review   No visits with results within 2 Month(s) from this visit.   Latest known visit with results is:   Hospital Outpatient Visit on 08/29/2023   Component Date Value    WBC 08/29/2023 8.5     RBC 08/29/2023 4.64     Hemoglobin 08/29/2023 15.6     Hematocrit 08/29/2023 46.4     MCV 08/29/2023 100.0     MCH 08/29/2023 33.6     MCHC 08/29/2023 33.6     RDW-SD 08/29/2023 44.7     RDW-CV 08/29/2023 12.0     Platelets 08/29/2023 221     MPV 08/29/2023 10.1     nRBC 08/29/2023 0     Calcium 08/29/2023 9.7     AST 08/29/2023 21     Alkaline Phosphatase 08/29/2023 64     Total Bilirubin 08/29/2023 0.5     Total Protein 08/29/2023 7.6     Albumin 08/29/2023 4.5     Globulin, Total 08/29/2023 3.1     A/G Ratio 08/29/2023 1.5     Sodium 08/29/2023 140     Potassium 08/29/2023 5.1     Chloride 08/29/2023 105     Bicarbonate 08/29/2023 23 (L)     Anion Gap 08/29/2023 12     Urea Nitrogen 08/29/2023 12     Creatinine 08/29/2023 0.7     Urea Nitrogen/Creatinine* 08/29/2023 17.1     Glucose 08/29/2023 100 (H)     ALT (SGPT) 08/29/2023 15     ESTIMATED GFR 08/29/2023 105     Hemoglobin A1C 08/29/2023 5.4     Anticoagulant 08/29/2023 INFORMATION NOT REPORTED TO LABORATORY     Protime 08/29/2023 10.7     INR 08/29/2023 1.0     aPTT 08/29/2023 27.2     Microscopic 08/29/2023 AUTOMATIC MICROSCOPIC URINES     WBC, Urine 08/29/2023 NONE SEEN     RBC, Urine 08/29/2023 NONE SEEN     Bacteria, Urine 08/29/2023 NEGATIVE     Squamous Epithelial Cell* 08/29/2023 NONE SEEN     Hyaline Casts, Urine 08/29/2023 NONE SEEN     Color, Urine 08/29/2023 PALE YELLOW     Appearance, Urine 08/29/2023 CLEAR     Specific Gravity, Urine 08/29/2023 1.021     pH, Urine 08/29/2023 7.5     Leukocyte Esterase, Urine 08/29/2023 NEGATIVE     Nitrite, Urine 08/29/2023 NEGATIVE     Protein, Urine 08/29/2023 TRACE  (A)     Glucose, Urine 08/29/2023 NEGATIVE     Ketones, Urine 08/29/2023 NEGATIVE     Urobilinogen, Urine 08/29/2023 NORMAL     Bilirubin, Urine 08/29/2023 NEGATIVE     Blood, Urine 08/29/2023 NEGATIVE     Urine Culture 08/29/2023                      Value:CULTURE NOT INDICATED  Performed at [address] [address]      COMPONENT CODE 08/29/2023 RED CELL GROUP     Units Ordered 08/29/2023 2     Specimen Expiration 08/29/2023 09/10/2023     ABO GROUP (TYPE) IN BLOOD 08/29/2023 A  POSITIVE     ANTIBODY SCREEN 08/29/2023 NEGATIVE     Arm Band Number 08/29/2023 [id number]     Surgery Date 08/29/2023 9/5/23 WEST     Test Ordered 08/29/2023 TYPE AND SCREEN     Pt Transfusion History 08/29/2023                      Value:BLOOD BANK RECORD SEARCH COMPLETED  NO PREVIOUS RECORD  NO PREVIOUS   TRANSFUSIONS IN LIFETIME Performed at [address] [address]      Unit Number 08/29/2023 [id number]     COMPONENT CODE 08/29/2023 LEUKO DEP RED CELLS ADSOL     Unit Division 08/29/2023 0     Status Of Unit 08/29/2023 REL FROM ALLOC     Transfusion Status 08/29/2023 OK TO TRANSFUSE     Crossmatch 08/29/2023 EXM Compatible     Unit Number 08/29/2023 [id number]     COMPONENT CODE 08/29/2023 LEUKO DEP RED CELLS ADSOL     Unit Division 08/29/2023 0     Status Of Unit 08/29/2023 REL FROM ALLOC     Transfusion Status 08/29/2023 OK TO TRANSFUSE     Crossmatch 08/29/2023 EXM Compatible     Specimen Source 08/29/2023                      Value:NASAL  Performed at [address] [address]      Meth. Resistant Staph By* 08/29/2023 NEGATIVE         Assessment/Plan   Patient Active Problem List   Diagnosis    Benign hypertensive heart disease without congestive heart failure    Arteriosclerotic vascular disease    Hyperlipidemia    Postsurgical percutaneous transluminal coronary angioplasty status    Tobacco dependence    Chest pain    Congestive heart failure (CMS/HCC)     Coronary artery disease    Essential hypertension    Hyperlipidemia    CAD (coronary artery disease)    Impaired perfusion of peripheral tissue    Stenosis of coronary stent    Stricture of artery (CMS/HCC)    Injury of back    VT (ventricular tachycardia) (CMS/HCC)    Acute low back pain    Acute myocardial infarction (CMS/HCC)    Alcohol dependence (CMS/HCC)    Atrial fibrillation (CMS/HCC)    Chronic systolic heart failure (CMS/HCC)    Clouded consciousness    Coronary angioplasty status    Fall    Fracture of rib    Tobacco use    Laceration of lower extremity    Abdominal pain    Abnormal x-ray examination      Patient returns to my office follow-up after MIDCAB LIMA to LAD DILLON to circumflex.  Had PCI to RCA in setting of inferior STEMI back in July 7, 2023 and then his MIDCAB was in September 7, 2023.  Postop was complicated by brief episode of atrial fibrillation currently on aspirin Brilinta amiodarone metoprolol and losartan.  EF is slightly reduced.  Patient's blood pressure elevated today.  Denies having any chest pain, shortness of breath, palpitations, dizziness or vomiting.  At this point my recommendation is to increase losartan to 50 mg daily.  Stop amiodarone.  Will follow-up in 4 months.  Will obtain monitor in 4 months for 14 to 28 days to see if he has any atrial fibrillation.  If he has atrial fibrillation then he needs oral anticoagulation with Eliquis if not he does not need any further changes.  Discussed with patient lifestyle modification.     Silvio Salazar MD

## 2024-01-27 ENCOUNTER — LAB (OUTPATIENT)
Dept: LAB | Facility: LAB | Age: 64
End: 2024-01-27
Payer: COMMERCIAL

## 2024-01-27 DIAGNOSIS — I25.10 CORONARY ARTERY DISEASE INVOLVING NATIVE CORONARY ARTERY OF NATIVE HEART WITHOUT ANGINA PECTORIS: ICD-10-CM

## 2024-01-27 DIAGNOSIS — I11.9 BENIGN HYPERTENSIVE HEART DISEASE WITHOUT CONGESTIVE HEART FAILURE: ICD-10-CM

## 2024-01-27 LAB
ANION GAP SERPL CALC-SCNC: 12 MMOL/L
BUN SERPL-MCNC: 17 MG/DL (ref 8–25)
CALCIUM SERPL-MCNC: 9.3 MG/DL (ref 8.5–10.4)
CHLORIDE SERPL-SCNC: 107 MMOL/L (ref 97–107)
CHOLEST SERPL-MCNC: 172 MG/DL (ref 133–200)
CHOLEST/HDLC SERPL: 2.3 {RATIO}
CO2 SERPL-SCNC: 24 MMOL/L (ref 24–31)
CREAT SERPL-MCNC: 0.9 MG/DL (ref 0.4–1.6)
EGFRCR SERPLBLD CKD-EPI 2021: >90 ML/MIN/1.73M*2
GLUCOSE SERPL-MCNC: 94 MG/DL (ref 65–99)
HDLC SERPL-MCNC: 75 MG/DL
LDLC SERPL CALC-MCNC: 84 MG/DL (ref 65–130)
POTASSIUM SERPL-SCNC: 4.1 MMOL/L (ref 3.4–5.1)
SODIUM SERPL-SCNC: 143 MMOL/L (ref 133–145)
TRIGL SERPL-MCNC: 67 MG/DL (ref 40–150)

## 2024-01-27 PROCEDURE — 36415 COLL VENOUS BLD VENIPUNCTURE: CPT

## 2024-01-27 PROCEDURE — 80048 BASIC METABOLIC PNL TOTAL CA: CPT

## 2024-01-27 PROCEDURE — 80061 LIPID PANEL: CPT

## 2024-02-08 ENCOUNTER — TELEPHONE (OUTPATIENT)
Dept: CARDIOLOGY | Facility: HOSPITAL | Age: 64
End: 2024-02-08
Payer: COMMERCIAL

## 2024-02-08 ENCOUNTER — TELEPHONE (OUTPATIENT)
Dept: CARDIOLOGY | Facility: CLINIC | Age: 64
End: 2024-02-08
Payer: COMMERCIAL

## 2024-02-08 DIAGNOSIS — E78.2 MIXED HYPERLIPIDEMIA: Primary | ICD-10-CM

## 2024-02-08 RX ORDER — EZETIMIBE 10 MG/1
10 TABLET ORAL DAILY
Qty: 30 TABLET | Refills: 11 | Status: SHIPPED | OUTPATIENT
Start: 2024-02-08 | End: 2025-02-07

## 2024-02-08 NOTE — TELEPHONE ENCOUNTER
----- Message from Silvio Salazar MD sent at 2/8/2024 10:38 AM EST -----  Tell patient that his cholesterol is not at target I would like to add a pill called Zetia or ezetimibe 10 mg daily.  Let me see him as scheduled  ----- Message -----  From: Lab, Background User  Sent: 1/27/2024   2:54 PM EST  To: Silvio Salazar MD

## 2024-03-26 ENCOUNTER — OFFICE VISIT (OUTPATIENT)
Dept: PRIMARY CARE | Facility: CLINIC | Age: 64
End: 2024-03-26
Payer: COMMERCIAL

## 2024-03-26 VITALS
OXYGEN SATURATION: 97 % | BODY MASS INDEX: 20.03 KG/M2 | HEART RATE: 86 BPM | DIASTOLIC BLOOD PRESSURE: 70 MMHG | TEMPERATURE: 97 F | SYSTOLIC BLOOD PRESSURE: 128 MMHG | WEIGHT: 132.2 LBS | HEIGHT: 68 IN

## 2024-03-26 DIAGNOSIS — Z72.0 TOBACCO ABUSE: ICD-10-CM

## 2024-03-26 DIAGNOSIS — I70.90 ARTERIOSCLEROTIC VASCULAR DISEASE: Primary | ICD-10-CM

## 2024-03-26 DIAGNOSIS — R04.0 EPISTAXIS: ICD-10-CM

## 2024-03-26 PROCEDURE — 3078F DIAST BP <80 MM HG: CPT | Performed by: FAMILY MEDICINE

## 2024-03-26 PROCEDURE — 3074F SYST BP LT 130 MM HG: CPT | Performed by: FAMILY MEDICINE

## 2024-03-26 PROCEDURE — 99214 OFFICE O/P EST MOD 30 MIN: CPT | Performed by: FAMILY MEDICINE

## 2024-03-26 RX ORDER — NITROGLYCERIN 0.4 MG/1
TABLET SUBLINGUAL
Qty: 25 TABLET | Refills: 0 | Status: SHIPPED | OUTPATIENT
Start: 2024-03-26 | End: 2025-03-26

## 2024-03-26 ASSESSMENT — PATIENT HEALTH QUESTIONNAIRE - PHQ9
1. LITTLE INTEREST OR PLEASURE IN DOING THINGS: NOT AT ALL
2. FEELING DOWN, DEPRESSED OR HOPELESS: NOT AT ALL
SUM OF ALL RESPONSES TO PHQ9 QUESTIONS 1 AND 2: 0

## 2024-03-26 ASSESSMENT — ENCOUNTER SYMPTOMS
LOSS OF SENSATION IN FEET: 0
OCCASIONAL FEELINGS OF UNSTEADINESS: 0
DEPRESSION: 0

## 2024-03-26 ASSESSMENT — COLUMBIA-SUICIDE SEVERITY RATING SCALE - C-SSRS
6. HAVE YOU EVER DONE ANYTHING, STARTED TO DO ANYTHING, OR PREPARED TO DO ANYTHING TO END YOUR LIFE?: NO
2. HAVE YOU ACTUALLY HAD ANY THOUGHTS OF KILLING YOURSELF?: NO
1. IN THE PAST MONTH, HAVE YOU WISHED YOU WERE DEAD OR WISHED YOU COULD GO TO SLEEP AND NOT WAKE UP?: NO

## 2024-03-26 ASSESSMENT — PAIN SCALES - GENERAL: PAINLEVEL: 0-NO PAIN

## 2024-03-26 NOTE — PROGRESS NOTES
"Subjective   Patient ID: Mateo Woodall is a 63 y.o. male who presents for Epistaxis (Nose Bleed) (Nose bleed last pm, blood when blowing nose this am.).    Epistaxis (Nose Bleed)      he had cabg x 2 in the fall and 2 stents. (4 total) he is asa and Brilinta.   He had one other nose bleed in the fall.  Nose bleed last night was left sided lasting 20 min.  He continues to smoke.     Review of Systems   HENT:  Positive for nosebleeds.     see HPI    Objective   /70   Pulse 86   Temp 36.1 °C (97 °F)   Ht 1.715 m (5' 7.5\")   Wt 60 kg (132 lb 3.2 oz)   SpO2 97%   BMI 20.40 kg/m²     Physical Exam  Constitutional:       General: He is not in acute distress.     Appearance: Normal appearance.   Cardiovascular:      Rate and Rhythm: Normal rate and regular rhythm.      Heart sounds: No murmur heard.  Pulmonary:      Breath sounds: Normal breath sounds. No wheezing.   Neurological:      Mental Status: He is alert.         Assessment/Plan   Problem List Items Addressed This Visit             ICD-10-CM    Arteriosclerotic vascular disease - Primary I70.90    Relevant Medications    nitroglycerin (Nitrostat) 0.4 mg SL tablet     Other Visit Diagnoses         Codes    Epistaxis     R04.0    Tobacco abuse     Z72.0          Avoid nose blowing/picking etc.  He will  nasal cease for prn use later and follow up if any recurrence.  Saline NS prn.  Disc dc tobacco use.     "

## 2024-05-14 ENCOUNTER — APPOINTMENT (OUTPATIENT)
Dept: CARDIOLOGY | Facility: CLINIC | Age: 64
End: 2024-05-14
Payer: COMMERCIAL

## 2024-07-08 ENCOUNTER — APPOINTMENT (OUTPATIENT)
Dept: CARDIOLOGY | Facility: CLINIC | Age: 64
End: 2024-07-08
Payer: COMMERCIAL

## 2024-07-08 VITALS
TEMPERATURE: 98.8 F | SYSTOLIC BLOOD PRESSURE: 128 MMHG | HEIGHT: 68 IN | HEART RATE: 102 BPM | RESPIRATION RATE: 16 BRPM | BODY MASS INDEX: 19.4 KG/M2 | WEIGHT: 128 LBS | DIASTOLIC BLOOD PRESSURE: 63 MMHG | OXYGEN SATURATION: 97 %

## 2024-07-08 DIAGNOSIS — E78.2 MIXED HYPERLIPIDEMIA: ICD-10-CM

## 2024-07-08 DIAGNOSIS — R00.0 TACHYCARDIA: Primary | ICD-10-CM

## 2024-07-08 DIAGNOSIS — I10 ESSENTIAL HYPERTENSION: ICD-10-CM

## 2024-07-08 DIAGNOSIS — I47.20 VT (VENTRICULAR TACHYCARDIA) (MULTI): ICD-10-CM

## 2024-07-08 DIAGNOSIS — R06.02 SOB (SHORTNESS OF BREATH): ICD-10-CM

## 2024-07-08 DIAGNOSIS — I11.9 BENIGN HYPERTENSIVE HEART DISEASE WITHOUT CONGESTIVE HEART FAILURE: ICD-10-CM

## 2024-07-08 DIAGNOSIS — T82.855S: ICD-10-CM

## 2024-07-08 DIAGNOSIS — Z98.61 POSTSURGICAL PERCUTANEOUS TRANSLUMINAL CORONARY ANGIOPLASTY STATUS: ICD-10-CM

## 2024-07-08 DIAGNOSIS — I77.1 STRICTURE OF ARTERY (CMS-HCC): ICD-10-CM

## 2024-07-08 PROCEDURE — 93000 ELECTROCARDIOGRAM COMPLETE: CPT | Performed by: INTERNAL MEDICINE

## 2024-07-08 PROCEDURE — 3074F SYST BP LT 130 MM HG: CPT | Performed by: INTERNAL MEDICINE

## 2024-07-08 PROCEDURE — 99214 OFFICE O/P EST MOD 30 MIN: CPT | Performed by: INTERNAL MEDICINE

## 2024-07-08 PROCEDURE — 3078F DIAST BP <80 MM HG: CPT | Performed by: INTERNAL MEDICINE

## 2024-07-08 RX ORDER — METOPROLOL SUCCINATE 100 MG/1
100 TABLET, EXTENDED RELEASE ORAL DAILY
Qty: 90 TABLET | Refills: 2 | Status: SHIPPED | OUTPATIENT
Start: 2024-07-08 | End: 2025-04-04

## 2024-07-08 RX ORDER — LOSARTAN POTASSIUM 25 MG/1
25 TABLET ORAL DAILY
Qty: 90 TABLET | Refills: 3 | Status: SHIPPED | OUTPATIENT
Start: 2024-07-08 | End: 2025-07-08

## 2024-07-08 ASSESSMENT — LIFESTYLE VARIABLES
HAVE YOU OR SOMEONE ELSE BEEN INJURED AS A RESULT OF YOUR DRINKING: NO
HOW OFTEN DURING THE LAST YEAR HAVE YOU BEEN UNABLE TO REMEMBER WHAT HAPPENED THE NIGHT BEFORE BECAUSE YOU HAD BEEN DRINKING: NEVER
HOW OFTEN DURING THE LAST YEAR HAVE YOU HAD A FEELING OF GUILT OR REMORSE AFTER DRINKING: NEVER
AUDIT TOTAL SCORE: 3
SKIP TO QUESTIONS 9-10: 1
HAS A RELATIVE, FRIEND, DOCTOR, OR ANOTHER HEALTH PROFESSIONAL EXPRESSED CONCERN ABOUT YOUR DRINKING OR SUGGESTED YOU CUT DOWN: NO
HOW OFTEN DURING THE LAST YEAR HAVE YOU NEEDED AN ALCOHOLIC DRINK FIRST THING IN THE MORNING TO GET YOURSELF GOING AFTER A NIGHT OF HEAVY DRINKING: NEVER
AUDIT-C TOTAL SCORE: 3
HOW MANY STANDARD DRINKS CONTAINING ALCOHOL DO YOU HAVE ON A TYPICAL DAY: 1 OR 2
HOW OFTEN DURING THE LAST YEAR HAVE YOU FOUND THAT YOU WERE NOT ABLE TO STOP DRINKING ONCE YOU HAD STARTED: NEVER
HOW OFTEN DO YOU HAVE SIX OR MORE DRINKS ON ONE OCCASION: NEVER
HOW OFTEN DO YOU HAVE A DRINK CONTAINING ALCOHOL: 2-3 TIMES A WEEK
HOW OFTEN DURING THE LAST YEAR HAVE YOU FAILED TO DO WHAT WAS NORMALLY EXPECTED FROM YOU BECAUSE OF DRINKING: NEVER

## 2024-07-08 ASSESSMENT — PATIENT HEALTH QUESTIONNAIRE - PHQ9
2. FEELING DOWN, DEPRESSED OR HOPELESS: NOT AT ALL
SUM OF ALL RESPONSES TO PHQ9 QUESTIONS 1 AND 2: 0
1. LITTLE INTEREST OR PLEASURE IN DOING THINGS: NOT AT ALL

## 2024-07-08 ASSESSMENT — ENCOUNTER SYMPTOMS
OCCASIONAL FEELINGS OF UNSTEADINESS: 1
LOSS OF SENSATION IN FEET: 0
DEPRESSION: 0

## 2024-07-08 ASSESSMENT — PAIN SCALES - GENERAL: PAINLEVEL: 0-NO PAIN

## 2024-07-08 NOTE — PATIENT INSTRUCTIONS
Increase metoprolol to 100 mg oral daily.  Decrease losartan to half a pill a day.  Stop Brilinta once you are done with the current month.  We need to obtain 2D echo so call centralized scheduling for echocardiogram.  Ms. Vora will arrange for also monitor for you for 2 weeks.

## 2024-07-08 NOTE — PROGRESS NOTES
History of present illness:  Patient returns to my office follow-up after MIDCAB LIMA to LAD DILLON to circumflex.  Had PCI to RCA in setting of inferior STEMI back in July 7, 2023 and then his MIDCAB was in September 7, 2023.  Postop was complicated by brief episode of atrial fibrillation..  Patient returns to my office for follow-up.  He is a still complaining of shortness of breath with minimal activity.  Denies any chest pain palpitations dizziness or lightheadedness.  EKG today showing sinus tachycardia heart rate around 100 bpm with nonspecific ST-T abnormalities.     Past Medical History:   Diagnosis Date    Acute myocardial infarction (Multi) 07/07/2023    Alcohol dependence (Multi) 10/30/2023    Atrial fibrillation (Multi) 10/30/2023    Benign hypertensive heart disease without congestive heart failure 09/04/2023    Congestive heart failure (Multi) 09/30/2023    Coronary arteriosclerosis in native artery 07/07/2023    Coronary artery disease     Essential hypertension 09/30/2023    High blood cholesterol     HTN (hypertension)     Hyperlipidemia 09/04/2023    Impaired perfusion of peripheral tissue 10/30/2023    MI (myocardial infarction) (Multi) 2010    stents placed by Dr. Davila    MI (myocardial infarction) (Multi)     stents x2 by Dr. Salazar    Postsurgical percutaneous transluminal coronary angioplasty status 09/04/2023    Stenosis of coronary artery stent 07/07/2023    Stricture of artery (CMS-[facility]) 08/29/2023    Tobacco dependence 09/04/2023    VT (ventricular tachycardia) (Multi) 07/07/2023       Past Surgical History:   Procedure Laterality Date    CORONARY ANGIOPLASTY WITH STENT PLACEMENT      Dr Dvaila - 2 stents    CORONARY ANGIOPLASTY WITH STENT PLACEMENT      Dr Salazar - 2 stents    CORONARY ARTERY BYPASS GRAFT      MINI cabg x2    ORTHOPEDIC SURGERY      right ankle repair    ORTHOPEDIC SURGERY      right arm repair       No Known Allergies     reports that he quit smoking about 9 months ago. His  smoking use included cigarettes. He started smoking about 5 months ago. He has a 17.5 pack-year smoking history. He has been exposed to tobacco smoke. He has never used smokeless tobacco. He reports that he does not currently use alcohol. He reports that he does not use drugs.    Family History   Problem Relation Name Age of Onset    Other (old age) Mother      Other (cabg x3) Father      Prostate cancer Father         Patient's Medications   New Prescriptions    No medications on file   Previous Medications    ASPIRIN 81 MG EC TABLET    TAKE 1 TABLET BY MOUTH DAILY HOLD IF GIVEN IN LAST 24 HOURS OR IF HISTORY OF HYPERSENSITIVITY.    ATORVASTATIN (LIPITOR) 80 MG TABLET    Take 1 tablet (80 mg) by mouth once daily at bedtime.    BRILINTA 90 MG TABLET    Take 1 tablet (90 mg) by mouth 2 times a day.    EZETIMIBE (ZETIA) 10 MG TABLET    Take 1 tablet (10 mg) by mouth once daily.    LOSARTAN (COZAAR) 50 MG TABLET    Take 1 tablet (50 mg) by mouth once daily.    METOPROLOL SUCCINATE XL (TOPROL-XL) 50 MG 24 HR TABLET    Take 1 tablet (50 mg) by mouth once daily.    NITROGLYCERIN (NITROSTAT) 0.4 MG SL TABLET    TAKE 1 TABLET BY MOUTH SUBLINGUAL AS NEEDED FOR CHEST PAIN GIVE UP TO 3 DOSES HOLD IF SBP LESS THAN 90. NOTIFY PHYSICIAN    TRAMADOL (ULTRAM) 50 MG TABLET    Take 1 tablet (50 mg) by mouth every 6 hours if needed.   Modified Medications    No medications on file   Discontinued Medications    No medications on file       Objective   Physical Exam  General: Patient in no acute distress   HEENT: Atraumatic normocephalic.  Neck: Supple, jugular venous pressure within normal limit.  No bruits  Lungs: Clear to auscultation bilaterally  Cardiovascular: Regular rate and rhythm, normal heart sounds, no murmurs rubs or gallops  Abdomen: Soft nontender nondistended.  Normal bowel sounds.  Extremities: Warm to touch, no edema.        Lab Review   No visits with results within 2 Month(s) from this visit.   Latest known visit  with results is:   Lab on 01/27/2024   Component Date Value    Cholesterol 01/27/2024 172     HDL-Cholesterol 01/27/2024 75.0     Cholesterol/HDL Ratio 01/27/2024 2.3     LDL Calculated 01/27/2024 84     Triglycerides 01/27/2024 67     Glucose 01/27/2024 94     Sodium 01/27/2024 143     Potassium 01/27/2024 4.1     Chloride 01/27/2024 107     Bicarbonate 01/27/2024 24     Urea Nitrogen 01/27/2024 17     Creatinine 01/27/2024 0.90     eGFR 01/27/2024 >90     Calcium 01/27/2024 9.3     Anion Gap 01/27/2024 12         Assessment/Plan   Patient Active Problem List   Diagnosis    Benign hypertensive heart disease without congestive heart failure    Arteriosclerotic vascular disease    Hyperlipidemia    Postsurgical percutaneous transluminal coronary angioplasty status    Tobacco dependence    Chest pain    Congestive heart failure (Multi)    Coronary artery disease    Essential hypertension    Hyperlipidemia    CAD (coronary artery disease)    Impaired perfusion of peripheral tissue    Stenosis of coronary stent    Stricture of artery (CMS-HCC)    Injury of back    VT (ventricular tachycardia) (Multi)    Acute low back pain    Acute myocardial infarction (Multi)    Alcohol dependence (Multi)    Atrial fibrillation (Multi)    Chronic systolic heart failure (Multi)    Clouded consciousness    Coronary angioplasty status    Fall    Fracture of rib    Tobacco use    Laceration of lower extremity    Abdominal pain    Abnormal x-ray examination      Patient returns to my office follow-up after MIDCAB LIMA to LAD DILLON to circumflex.  Had PCI to RCA in setting of inferior STEMI back in July 7, 2023 and then his MIDCAB was in September 7, 2023.  Postop was complicated by brief episode of atrial fibrillation..  Patient returns to my office for follow-up.  He is a still complaining of shortness of breath with minimal activity.  Denies any chest pain palpitations dizziness or lightheadedness.  EKG today showing sinus tachycardia  heart rate around 100 bpm with nonspecific ST-T abnormalities.  At this point my recommendation is to arrange for ZOLL monitor for 14 days to rule out residual atrial fibrillation after his open heart surgery.  Has been off the amiodarone for more than 3 months.  We will double up on metoprolol will go up to 100 mg oral daily decrease losartan to 25 mg oral daily.  We will follow-up in 3 months.  Will repeat another 2D echo to make sure his ejection fraction is stable with the shortness of breath although most likely is related to COPD.      Silvio Salazar MD

## 2024-07-11 DIAGNOSIS — R07.9 CHEST PAIN, UNSPECIFIED TYPE: ICD-10-CM

## 2024-07-12 RX ORDER — NITROGLYCERIN 0.4 MG/1
TABLET SUBLINGUAL
Qty: 25 TABLET | Refills: 0 | Status: SHIPPED | OUTPATIENT
Start: 2024-07-12

## 2024-07-29 ENCOUNTER — HOSPITAL ENCOUNTER (OUTPATIENT)
Dept: CARDIOLOGY | Facility: HOSPITAL | Age: 64
Discharge: HOME | End: 2024-07-29
Payer: COMMERCIAL

## 2024-07-29 DIAGNOSIS — R06.02 SOB (SHORTNESS OF BREATH): ICD-10-CM

## 2024-07-29 PROCEDURE — 93306 TTE W/DOPPLER COMPLETE: CPT | Performed by: INTERNAL MEDICINE

## 2024-07-29 PROCEDURE — 93306 TTE W/DOPPLER COMPLETE: CPT

## 2024-07-30 LAB
AORTIC VALVE MEAN GRADIENT: 2 MMHG
AORTIC VALVE PEAK VELOCITY: 1 M/S
AV PEAK GRADIENT: 4 MMHG
AVA (PEAK VEL): 2.96 CM2
AVA (VTI): 2.54 CM2
EJECTION FRACTION APICAL 4 CHAMBER: 38.1
EJECTION FRACTION: 33 %
LEFT ATRIUM VOLUME AREA LENGTH INDEX BSA: 18.1 ML/M2
LEFT VENTRICLE INTERNAL DIMENSION DIASTOLE: 5.07 CM (ref 3.5–6)
LEFT VENTRICULAR OUTFLOW TRACT DIAMETER: 2 CM
LV EJECTION FRACTION BIPLANE: 34 %
MITRAL VALVE E/A RATIO: 0.52
RIGHT VENTRICLE FREE WALL PEAK S': 8.81 CM/S
TRICUSPID ANNULAR PLANE SYSTOLIC EXCURSION: 1.4 CM

## 2024-08-15 ENCOUNTER — TELEPHONE (OUTPATIENT)
Dept: CARDIOLOGY | Facility: CLINIC | Age: 64
End: 2024-08-15
Payer: COMMERCIAL

## 2024-08-15 NOTE — TELEPHONE ENCOUNTER
----- Message from Silvio Salazar sent at 8/12/2024  6:00 PM EDT -----  Tell patient to come and see me maybe give him appointment this week or early next week.  His ejection fraction is low.  I left him a voice message to call me.  ----- Message -----  From: Song Gomez - Cardiology Results In  Sent: 7/30/2024   9:17 AM EDT  To: Silvio Salazar MD

## 2024-08-20 ENCOUNTER — APPOINTMENT (OUTPATIENT)
Dept: CARDIOLOGY | Facility: CLINIC | Age: 64
End: 2024-08-20
Payer: COMMERCIAL

## 2024-08-20 VITALS
WEIGHT: 128 LBS | TEMPERATURE: 98.4 F | OXYGEN SATURATION: 96 % | RESPIRATION RATE: 16 BRPM | SYSTOLIC BLOOD PRESSURE: 130 MMHG | DIASTOLIC BLOOD PRESSURE: 72 MMHG | BODY MASS INDEX: 19.4 KG/M2 | HEIGHT: 68 IN | HEART RATE: 78 BPM

## 2024-08-20 DIAGNOSIS — I47.20 VENTRICULAR TACHYCARDIA, UNSPECIFIED (MULTI): ICD-10-CM

## 2024-08-20 DIAGNOSIS — I11.9 BENIGN HYPERTENSIVE HEART DISEASE WITHOUT CONGESTIVE HEART FAILURE: ICD-10-CM

## 2024-08-20 DIAGNOSIS — I10 ESSENTIAL HYPERTENSION: ICD-10-CM

## 2024-08-20 DIAGNOSIS — I50.20 SYSTOLIC CONGESTIVE HEART FAILURE, UNSPECIFIED HF CHRONICITY (MULTI): Primary | ICD-10-CM

## 2024-08-20 DIAGNOSIS — E78.2 MIXED HYPERLIPIDEMIA: ICD-10-CM

## 2024-08-20 DIAGNOSIS — T82.855S: ICD-10-CM

## 2024-08-20 DIAGNOSIS — Z98.61 POSTSURGICAL PERCUTANEOUS TRANSLUMINAL CORONARY ANGIOPLASTY STATUS: ICD-10-CM

## 2024-08-20 DIAGNOSIS — E78.1 PURE HYPERTRIGLYCERIDEMIA: ICD-10-CM

## 2024-08-20 DIAGNOSIS — I77.1 STRICTURE OF ARTERY (CMS-HCC): ICD-10-CM

## 2024-08-20 PROCEDURE — 3078F DIAST BP <80 MM HG: CPT | Performed by: INTERNAL MEDICINE

## 2024-08-20 PROCEDURE — 99214 OFFICE O/P EST MOD 30 MIN: CPT | Performed by: INTERNAL MEDICINE

## 2024-08-20 PROCEDURE — 3075F SYST BP GE 130 - 139MM HG: CPT | Performed by: INTERNAL MEDICINE

## 2024-08-20 PROCEDURE — 3008F BODY MASS INDEX DOCD: CPT | Performed by: INTERNAL MEDICINE

## 2024-08-20 RX ORDER — LOSARTAN POTASSIUM 50 MG/1
50 TABLET ORAL DAILY
Qty: 90 TABLET | Refills: 3 | Status: SHIPPED | OUTPATIENT
Start: 2024-08-20 | End: 2025-08-20

## 2024-08-20 RX ORDER — METOPROLOL SUCCINATE 200 MG/1
200 TABLET, EXTENDED RELEASE ORAL DAILY
Qty: 90 TABLET | Refills: 3 | Status: SHIPPED | OUTPATIENT
Start: 2024-08-20 | End: 2025-08-20

## 2024-08-20 RX ORDER — TICAGRELOR 90 MG/1
90 TABLET ORAL 2 TIMES DAILY
COMMUNITY

## 2024-08-20 ASSESSMENT — ENCOUNTER SYMPTOMS
DEPRESSION: 0
OCCASIONAL FEELINGS OF UNSTEADINESS: 0
LOSS OF SENSATION IN FEET: 0

## 2024-08-20 ASSESSMENT — PAIN SCALES - GENERAL: PAINLEVEL: 0-NO PAIN

## 2024-08-20 ASSESSMENT — LIFESTYLE VARIABLES
HOW MANY STANDARD DRINKS CONTAINING ALCOHOL DO YOU HAVE ON A TYPICAL DAY: 1 OR 2
AUDIT-C TOTAL SCORE: 3
HOW OFTEN DURING THE LAST YEAR HAVE YOU HAD A FEELING OF GUILT OR REMORSE AFTER DRINKING: NEVER
AUDIT TOTAL SCORE: 3
HOW OFTEN DURING THE LAST YEAR HAVE YOU FAILED TO DO WHAT WAS NORMALLY EXPECTED FROM YOU BECAUSE OF DRINKING: NEVER
HOW OFTEN DO YOU HAVE SIX OR MORE DRINKS ON ONE OCCASION: NEVER
SKIP TO QUESTIONS 9-10: 1
HOW OFTEN DURING THE LAST YEAR HAVE YOU BEEN UNABLE TO REMEMBER WHAT HAPPENED THE NIGHT BEFORE BECAUSE YOU HAD BEEN DRINKING: NEVER
HOW OFTEN DURING THE LAST YEAR HAVE YOU FOUND THAT YOU WERE NOT ABLE TO STOP DRINKING ONCE YOU HAD STARTED: NEVER
HAVE YOU OR SOMEONE ELSE BEEN INJURED AS A RESULT OF YOUR DRINKING: NO
HOW OFTEN DO YOU HAVE A DRINK CONTAINING ALCOHOL: 2-3 TIMES A WEEK
HOW OFTEN DURING THE LAST YEAR HAVE YOU NEEDED AN ALCOHOLIC DRINK FIRST THING IN THE MORNING TO GET YOURSELF GOING AFTER A NIGHT OF HEAVY DRINKING: NEVER
HAS A RELATIVE, FRIEND, DOCTOR, OR ANOTHER HEALTH PROFESSIONAL EXPRESSED CONCERN ABOUT YOUR DRINKING OR SUGGESTED YOU CUT DOWN: NO

## 2024-08-20 NOTE — PATIENT INSTRUCTIONS
Increase metoprolol to 200 mg a day.  Increase losartan to 50 mg daily.  If you start getting dizzy or not feeling good call me back.  Will refer you to  for your leg pain.  I wanted to arrange also for MUGA scan in 4 weeks from now call centralized scheduling.  I will see you in 3 months

## 2024-09-06 ENCOUNTER — APPOINTMENT (OUTPATIENT)
Dept: CARDIOLOGY | Facility: HOSPITAL | Age: 64
End: 2024-09-06
Payer: COMMERCIAL

## 2024-09-09 ENCOUNTER — HOSPITAL ENCOUNTER (OUTPATIENT)
Dept: RADIOLOGY | Facility: HOSPITAL | Age: 64
Discharge: HOME | End: 2024-09-09
Payer: COMMERCIAL

## 2024-09-09 DIAGNOSIS — I50.20 SYSTOLIC CONGESTIVE HEART FAILURE, UNSPECIFIED HF CHRONICITY (MULTI): ICD-10-CM

## 2024-09-09 DIAGNOSIS — I11.9 BENIGN HYPERTENSIVE HEART DISEASE WITHOUT CONGESTIVE HEART FAILURE: ICD-10-CM

## 2024-09-09 PROCEDURE — 3430000001 HC RX 343 DIAGNOSTIC RADIOPHARMACEUTICALS: Performed by: INTERNAL MEDICINE

## 2024-09-09 PROCEDURE — A9560 TC99M LABELED RBC: HCPCS | Performed by: INTERNAL MEDICINE

## 2024-09-09 PROCEDURE — 78472 GATED HEART PLANAR SINGLE: CPT

## 2024-09-09 PROCEDURE — 78472 GATED HEART PLANAR SINGLE: CPT | Performed by: RADIOLOGY

## 2024-09-20 ENCOUNTER — OFFICE VISIT (OUTPATIENT)
Dept: CARDIOLOGY | Facility: HOSPITAL | Age: 64
End: 2024-09-20
Payer: COMMERCIAL

## 2024-09-20 VITALS
OXYGEN SATURATION: 98 % | HEART RATE: 68 BPM | SYSTOLIC BLOOD PRESSURE: 154 MMHG | BODY MASS INDEX: 20.04 KG/M2 | DIASTOLIC BLOOD PRESSURE: 77 MMHG | WEIGHT: 129.85 LBS

## 2024-09-20 DIAGNOSIS — I11.9 BENIGN HYPERTENSIVE HEART DISEASE WITHOUT CONGESTIVE HEART FAILURE: ICD-10-CM

## 2024-09-20 DIAGNOSIS — I50.20 SYSTOLIC CONGESTIVE HEART FAILURE, UNSPECIFIED HF CHRONICITY: ICD-10-CM

## 2024-09-20 DIAGNOSIS — I73.9 CLAUDICATION OF LOWER EXTREMITY (CMS-HCC): ICD-10-CM

## 2024-09-20 DIAGNOSIS — F17.200 NICOTINE DEPENDENCE WITH CURRENT USE: Primary | ICD-10-CM

## 2024-09-20 PROCEDURE — 99215 OFFICE O/P EST HI 40 MIN: CPT | Performed by: INTERNAL MEDICINE

## 2024-09-20 PROCEDURE — 3077F SYST BP >= 140 MM HG: CPT | Performed by: INTERNAL MEDICINE

## 2024-09-20 PROCEDURE — 3078F DIAST BP <80 MM HG: CPT | Performed by: INTERNAL MEDICINE

## 2024-09-20 RX ORDER — LOSARTAN POTASSIUM 100 MG/1
100 TABLET ORAL DAILY
Qty: 90 TABLET | Refills: 3 | Status: SHIPPED | OUTPATIENT
Start: 2024-09-20 | End: 2025-09-20

## 2024-09-20 RX ORDER — VARENICLINE TARTRATE 0.5 (11)-1
KIT ORAL
Qty: 1 EACH | Refills: 0 | Status: SHIPPED | OUTPATIENT
Start: 2024-09-20 | End: 2024-12-19

## 2024-09-20 RX ORDER — VARENICLINE TARTRATE 1 MG/1
1 TABLET, FILM COATED ORAL 2 TIMES DAILY
Qty: 60 TABLET | Refills: 1 | Status: SHIPPED | OUTPATIENT
Start: 2024-09-20 | End: 2024-11-19

## 2024-09-25 ENCOUNTER — TELEPHONE (OUTPATIENT)
Dept: CARDIOLOGY | Facility: CLINIC | Age: 64
End: 2024-09-25
Payer: COMMERCIAL

## 2024-09-25 NOTE — TELEPHONE ENCOUNTER
----- Message from Silvio Salazar sent at 9/24/2024  5:18 PM EDT -----  Tell patient his MUGA scan was overall okay ejection fraction of 38% he does not need ICD for now we will follow-up as scheduled  ----- Message -----  From: Interface, Radiology Results In  Sent: 9/9/2024  12:22 PM EDT  To: Silvio Salazar MD

## 2024-09-26 DIAGNOSIS — I25.10 CORONARY ARTERY DISEASE INVOLVING NATIVE CORONARY ARTERY OF NATIVE HEART WITHOUT ANGINA PECTORIS: ICD-10-CM

## 2024-09-26 RX ORDER — ATORVASTATIN CALCIUM 80 MG/1
80 TABLET, FILM COATED ORAL NIGHTLY
Qty: 90 TABLET | Refills: 3 | Status: SHIPPED | OUTPATIENT
Start: 2024-09-26 | End: 2025-09-21

## 2024-09-26 NOTE — TELEPHONE ENCOUNTER
Pharmacy is requesting a refill on pt behalf, pharmacy will let pt know when ready for \ out for delivery      Requested Prescriptions     Pending Prescriptions Disp Refills    atorvastatin (Lipitor) 80 mg tablet [Pharmacy Med Name: ATORVASTATIN 80 MG TABLET] 90 tablet 3     Sig: Take 1 tablet (80 mg) by mouth once daily at bedtime.

## 2024-10-01 ENCOUNTER — APPOINTMENT (OUTPATIENT)
Dept: CARDIOLOGY | Facility: CLINIC | Age: 64
End: 2024-10-01
Payer: COMMERCIAL

## 2024-10-16 DIAGNOSIS — I10 PRIMARY HYPERTENSION: Primary | ICD-10-CM

## 2024-10-16 RX ORDER — METOPROLOL SUCCINATE 50 MG/1
50 TABLET, EXTENDED RELEASE ORAL DAILY
COMMUNITY

## 2024-10-16 NOTE — TELEPHONE ENCOUNTER
Pharmacy is requesting a refill on pt behalf, pharmacy will let pt know when ready for \ out for delivery        Requested Prescriptions     Pending Prescriptions Disp Refills    metoprolol succinate XL (Toprol-XL) 50 mg 24 hr tablet [Pharmacy Med Name: METOPROLOL SUCC ER 50 MG TAB] 90 tablet 3     Sig: Take 1 tablet (50 mg) by mouth once daily.

## 2024-10-17 RX ORDER — METOPROLOL SUCCINATE 50 MG/1
50 TABLET, EXTENDED RELEASE ORAL DAILY
Qty: 90 TABLET | Refills: 3 | Status: SHIPPED | OUTPATIENT
Start: 2024-10-17 | End: 2025-10-12

## 2024-10-27 DIAGNOSIS — F17.200 NICOTINE DEPENDENCE WITH CURRENT USE: ICD-10-CM

## 2024-11-07 ENCOUNTER — APPOINTMENT (OUTPATIENT)
Dept: CARDIOLOGY | Facility: CLINIC | Age: 64
End: 2024-11-07
Payer: COMMERCIAL

## 2024-11-14 ENCOUNTER — TELEPHONE (OUTPATIENT)
Dept: CARDIOLOGY | Facility: CLINIC | Age: 64
End: 2024-11-14
Payer: COMMERCIAL

## 2024-11-18 RX ORDER — VARENICLINE TARTRATE 0.5 (11)-1
KIT ORAL
Qty: 53 EACH | Refills: 0 | Status: SHIPPED | OUTPATIENT
Start: 2024-11-18

## 2024-11-18 NOTE — PROGRESS NOTES
Primary Care Physician: Ernestina Jeffery, APRN-CNP   Date of Visit: 09/20/2024 10:30 AM EDT  Type of Visit: New patient    Chief Complaint:  Claudication    HPI  Mateo Woodall is a 64 y.o. male who presents establish care.    He has a standing left lower extremity claudication.  Denies any rest pain or nonhealing wounds.  He reports that his leg often gives out and he cannot climb up any stairs.  Also reports occasional cramping in that leg.    He is a patient of Dr. Salazar with prior coronary artery bypass graft surgery.    Review of Systems   12 point review of systems was obtained in detail and is negative other than that noted above or below.     Past Medical/Surgical History:  Mateo has a past medical history of Acute myocardial infarction (Multi) (07/07/2023), Alcohol dependence (Multi) (10/30/2023), Atrial fibrillation (Multi) (10/30/2023), Benign hypertensive heart disease without congestive heart failure (09/04/2023), Congestive heart failure ([facility]) (09/30/2023), Coronary arteriosclerosis in native artery (07/07/2023), Coronary artery disease, Essential hypertension (09/30/2023), High blood cholesterol, HTN (hypertension), Hyperlipidemia (09/04/2023), Impaired perfusion of peripheral tissue (10/30/2023), MI (myocardial infarction) (Multi) (2010), MI (myocardial infarction) (Multi), Postsurgical percutaneous transluminal coronary angioplasty status (09/04/2023), Stenosis of coronary artery stent (07/07/2023), Stricture of artery (CMS-[facility]) (08/29/2023), Tobacco dependence (09/04/2023), and VT (ventricular tachycardia) (Multi) (07/07/2023).    Mateo has a past surgical history that includes Coronary artery bypass graft; orthopedic surgery; orthopedic surgery; Coronary angioplasty with stent; and Coronary angioplasty with stent.    Social/Family History:  Mateo reports that he quit smoking about 13 months ago. His smoking use included cigarettes. He started smoking about 9 months ago. He has a 17.5 pack-year  smoking history. He has been exposed to tobacco smoke. He has never used smokeless tobacco. He reports that he does not currently use alcohol. He reports that he does not use drugs.    Mateo's family history includes Prostate cancer in his father; cabg x3 in his father; old age in his mother.    Allergies:  No Known Allergies    Medications:  Current Outpatient Medications   Medication Sig Dispense Refill    aspirin 81 mg EC tablet TAKE 1 TABLET BY MOUTH DAILY HOLD IF GIVEN IN LAST 24 HOURS OR IF HISTORY OF HYPERSENSITIVITY. 90 tablet 3    Brilinta 90 mg tablet Take 1 tablet (90 mg) by mouth 2 times a day.      ezetimibe (Zetia) 10 mg tablet Take 1 tablet (10 mg) by mouth once daily. 30 tablet 11    metoprolol succinate XL (Toprol-XL) 200 mg 24 hr tablet Take 1 tablet (200 mg) by mouth once daily. 90 tablet 3    nitroglycerin (Nitrostat) 0.4 mg SL tablet Take 1 tablet by mouth sublingual as needed for chest pain give up to 3 doses hold of sbp is less than 90. Notify physician. 25 tablet 0    atorvastatin (Lipitor) 80 mg tablet Take 1 tablet (80 mg) by mouth once daily at bedtime. 90 tablet 3    losartan (Cozaar) 100 mg tablet Take 1 tablet (100 mg) by mouth once daily. 90 tablet 3    metoprolol succinate XL (Toprol-XL) 50 mg 24 hr tablet Take 1 tablet (50 mg) by mouth once daily. 90 tablet 3    metoprolol succinate XL (Toprol-XL) 50 mg 24 hr tablet Take 1 tablet (50 mg) by mouth once daily. Do not crush or chew.      traMADol (Ultram) 50 mg tablet Take 1 tablet (50 mg) by mouth every 6 hours if needed.      varenicline (Chantix BEATRIZ) 0.5 mg (11)- 1 mg (42) tablet Use as directed on package instructions, try to quit smoking after 1 week. 1 each 0    varenicline (Chantix) 1 mg tablet Take 1 tablet (1 mg) by mouth 2 times a day. Take with full glass of water. 60 tablet 1     No current facility-administered medications for this visit.       Objective:   Vitals:    09/20/24 1057   BP: 154/77   Pulse: 68   SpO2: 98%        S1-S2 normal, regular rate and rhythm  Clear to auscultation bilaterally    Labs and Imaging:      Latest Ref Rng & Units 9/9/2023     6:26 PM 9/9/2023    11:06 PM 9/10/2023     4:05 AM 9/10/2023     1:32 PM 9/11/2023     4:03 AM 9/12/2023     2:30 AM 1/27/2024    10:24 AM   Electrolytes   Na 133 - 145 mmol/L   142   142  139  143    K 3.4 - 5.1 mmol/L 3.4  3.7  3.9  3.9  4.3  4.0  4.1    Cl 97 - 107 mmol/L   110   107  104  107    CO2 24 - 31 mmol/L   23   26  25  24    Cr 0.40 - 1.60 mg/dL   0.7   0.7  0.6  0.90    Ca 8.5 - 10.4 mg/dL   8.2   8.6  8.7  9.3          Latest Ref Rng & Units 9/7/2023     1:28 PM 9/7/2023     7:13 PM 9/8/2023     3:36 AM 9/9/2023     4:17 AM 9/10/2023     4:05 AM 9/11/2023     4:03 AM 9/12/2023     2:30 AM   CBC   Hb 13.5 - 16.5 GM/DL 12.0   10.7  9.6  9.5  10.2  10.6    Hct 41 - 50 % 35.5  31.7  31.9  28.7  28.4  29.7  31.5    MCV 80 - 100 .0   102.2  101.8  101.8  100.0  99.1          Latest Ref Rng & Units 8/29/2023     8:32 AM 9/7/2023     1:28 PM 9/8/2023     3:36 AM 9/9/2023     4:17 AM 9/10/2023     4:05 AM 9/11/2023     4:03 AM 1/27/2024    10:24 AM   Lipids   Chol 133 - 200 mg/dL       172    HDL >40.0 mg/dL       75.0    LDL 65 - 130 mg/dL       84    Trig 40 - 150 mg/dL       67    ALT 5 - 40 U/L 15  12  14  14  13  12     AST 5 - 40 U/L 21  23  39  31  23  16           Latest Ref Rng & Units 7/8/2023     5:26 AM   Thyroid   TSH 0.27 - 4.20 MIU/L 1.49           No data to display                 Lab Results   Component Value Date    HGBA1C 5.4 08/29/2023    HGBA1C 5.1 07/08/2023        The ASCVD Risk score (Johan WANG, et al., 2019) failed to calculate for the following reasons:    Risk score cannot be calculated because patient has a medical history suggesting prior/existing ASCVD     Echocardiogram: No results found for this or any previous visit.     Echocardiogram:   ECHOCARDIOGRAM     Narrative  Ordered by an unspecified provider.    Stress Testing: No  results found for this or any previous visit from the past 1825 days.    Cardiac Catheterization:   ADULT CATH     Narrative  Ordered by an unspecified provider.    Cardiac Scoring: No results found for this or any previous visit from the past 1825 days.    AAA : No results found for this or any previous visit from the past 1825 days.    OTHER: No results found for this or any previous visit from the past 1825 days.        Assessment/Plan:   1. Nicotine dependence with current use  varenicline (Chantix BEATRIZ) 0.5 mg (11)- 1 mg (42) tablet    varenicline (Chantix) 1 mg tablet      2. Benign hypertensive heart disease without congestive heart failure  Referral to Cardiology    losartan (Cozaar) 100 mg tablet      3. Systolic congestive heart failure, unspecified HF chronicity (Multi)  Referral to Cardiology      4. Claudication of lower extremity ([facility])  Referral to Vascular Rehab           Mateo Woodall is a 64 y.o. male who presents establish care:    1.  Left lower extremity claudication  2.  Nicotine dependence syndrome  3.  Chronic ischemic heart disease (MIDCAB LIMA-LAD DILLON-LCx; prior PCI to the RCA for inferior STEMI)  4.  Ischemic cardiomyopathy  5.  Moderate left ventricular systolic dysfunction  6.  Hypertension    Trial of vascular rehabilitation.    Aggressive risk factor control-increase losartan to 100 mg daily given uncontrolled blood pressures.    He is interested in smoking cessation-start varenicline.  No history of seizure disorder, suicidal or homicidal ideation.    ____________________________________________________________  [place] MD

## 2024-11-20 ENCOUNTER — APPOINTMENT (OUTPATIENT)
Dept: CARDIOLOGY | Facility: CLINIC | Age: 64
End: 2024-11-20
Payer: COMMERCIAL

## 2025-01-16 DIAGNOSIS — E78.2 MIXED HYPERLIPIDEMIA: ICD-10-CM

## 2025-01-16 RX ORDER — EZETIMIBE 10 MG/1
10 TABLET ORAL DAILY
Qty: 90 TABLET | Refills: 3 | Status: SHIPPED | OUTPATIENT
Start: 2025-01-16

## 2025-01-16 NOTE — TELEPHONE ENCOUNTER
Please send the attached prescription to the patient's pharmacy as soon as possible. Thank you!    Requested Prescriptions     Pending Prescriptions Disp Refills    ezetimibe (Zetia) 10 mg tablet [Pharmacy Med Name: EZETIMIBE 10 MG TABLET] 90 tablet 3     Sig: TAKE 1 TABLET BY MOUTH EVERY DAY

## 2025-04-09 DIAGNOSIS — I48.91 ATRIAL FIBRILLATION, UNSPECIFIED TYPE (MULTI): ICD-10-CM

## 2025-04-09 NOTE — TELEPHONE ENCOUNTER
Brilinta 90 mg tablet  90 mg, 2 times daily   [id number] - [facility] [address] AT [facility]

## 2025-04-09 NOTE — TELEPHONE ENCOUNTER
Please send the attached prescription to the patient's pharmacy as soon as possible. Thank you!    Requested Prescriptions     Pending Prescriptions Disp Refills    Brilinta 90 mg tablet 180 tablet 3     Sig: Take 1 tablet (90 mg) by mouth 2 times a day.

## 2025-04-10 RX ORDER — TICAGRELOR 90 MG/1
90 TABLET ORAL 2 TIMES DAILY
Qty: 180 TABLET | Refills: 3 | Status: SHIPPED | OUTPATIENT
Start: 2025-04-10 | End: 2025-04-11

## 2025-04-11 ENCOUNTER — TELEPHONE (OUTPATIENT)
Dept: CARDIOLOGY | Facility: CLINIC | Age: 65
End: 2025-04-11
Payer: COMMERCIAL

## 2025-04-11 DIAGNOSIS — I48.91 ATRIAL FIBRILLATION, UNSPECIFIED TYPE (MULTI): ICD-10-CM

## 2025-04-11 RX ORDER — TICAGRELOR 90 MG/1
90 TABLET ORAL 2 TIMES DAILY
Qty: 180 TABLET | Refills: 3 | Status: SHIPPED | OUTPATIENT
Start: 2025-04-11 | End: 2026-04-06

## 2025-04-11 NOTE — TELEPHONE ENCOUNTER
Patient states he can't afford his Brilinta due to losing his job and not having insurance. Is there something else he can take.

## 2025-04-15 NOTE — TELEPHONE ENCOUNTER
Spoken with pt and he stated that it was explained to him that he will start getting the generic for Brilinta

## 2025-04-17 DIAGNOSIS — I48.91 ATRIAL FIBRILLATION, UNSPECIFIED TYPE (MULTI): ICD-10-CM

## 2025-04-18 RX ORDER — TICAGRELOR 90 MG/1
90 TABLET ORAL 2 TIMES DAILY
Qty: 180 TABLET | Refills: 3 | Status: SHIPPED | OUTPATIENT
Start: 2025-04-18 | End: 2026-04-13

## 2025-04-21 NOTE — TELEPHONE ENCOUNTER
Patient called in again stating Brilinta is ti expensive he  does not have insurance right now , and he needs something that is going to be less than 100 dollars, he is out of medication now

## 2025-04-27 ENCOUNTER — APPOINTMENT (OUTPATIENT)
Dept: RADIOLOGY | Facility: HOSPITAL | Age: 65
End: 2025-04-27
Payer: MEDICAID

## 2025-04-27 ENCOUNTER — HOSPITAL ENCOUNTER (EMERGENCY)
Facility: HOSPITAL | Age: 65
Discharge: OTHER INSTITUTION | End: 2025-04-27
Attending: EMERGENCY MEDICINE
Payer: COMMERCIAL

## 2025-04-27 ENCOUNTER — APPOINTMENT (OUTPATIENT)
Dept: CARDIOLOGY | Facility: HOSPITAL | Age: 65
End: 2025-04-27
Payer: MEDICAID

## 2025-04-27 ENCOUNTER — HOSPITAL ENCOUNTER (INPATIENT)
Facility: HOSPITAL | Age: 65
LOS: 4 days | Discharge: HOME | End: 2025-05-01
Attending: INTERNAL MEDICINE | Admitting: INTERNAL MEDICINE

## 2025-04-27 ENCOUNTER — APPOINTMENT (OUTPATIENT)
Dept: RADIOLOGY | Facility: HOSPITAL | Age: 65
End: 2025-04-27

## 2025-04-27 VITALS
TEMPERATURE: 98 F | WEIGHT: 121.69 LBS | HEART RATE: 99 BPM | BODY MASS INDEX: 19.1 KG/M2 | HEIGHT: 67 IN | DIASTOLIC BLOOD PRESSURE: 82 MMHG | SYSTOLIC BLOOD PRESSURE: 137 MMHG | OXYGEN SATURATION: 100 % | RESPIRATION RATE: 17 BRPM

## 2025-04-27 DIAGNOSIS — I25.10 CORONARY ARTERY DISEASE INVOLVING NATIVE CORONARY ARTERY OF NATIVE HEART WITHOUT ANGINA PECTORIS: ICD-10-CM

## 2025-04-27 DIAGNOSIS — I63.439 CEREBROVASCULAR ACCIDENT (CVA) DUE TO EMBOLISM OF POSTERIOR CEREBRAL ARTERY, UNSPECIFIED BLOOD VESSEL LATERALITY (MULTI): ICD-10-CM

## 2025-04-27 DIAGNOSIS — E78.2 MIXED HYPERLIPIDEMIA: ICD-10-CM

## 2025-04-27 DIAGNOSIS — R41.82 ALTERED MENTAL STATUS, UNSPECIFIED ALTERED MENTAL STATUS TYPE: ICD-10-CM

## 2025-04-27 DIAGNOSIS — R41.89 UNRESPONSIVE: Primary | ICD-10-CM

## 2025-04-27 DIAGNOSIS — I11.9 BENIGN HYPERTENSIVE HEART DISEASE WITHOUT CONGESTIVE HEART FAILURE: ICD-10-CM

## 2025-04-27 DIAGNOSIS — R07.9 CHEST PAIN, UNSPECIFIED TYPE: ICD-10-CM

## 2025-04-27 DIAGNOSIS — I63.432 CEREBRAL INFARCTION DUE TO EMBOLISM OF LEFT POSTERIOR CEREBRAL ARTERY (MULTI): ICD-10-CM

## 2025-04-27 DIAGNOSIS — J96.90 RESPIRATORY FAILURE, UNSPECIFIED CHRONICITY, UNSPECIFIED WHETHER WITH HYPOXIA OR HYPERCAPNIA: ICD-10-CM

## 2025-04-27 DIAGNOSIS — I10 PRIMARY HYPERTENSION: ICD-10-CM

## 2025-04-27 DIAGNOSIS — R41.82 AMS (ALTERED MENTAL STATUS): Primary | ICD-10-CM

## 2025-04-27 LAB
ALBUMIN SERPL BCP-MCNC: 3.9 G/DL (ref 3.4–5)
ALP SERPL-CCNC: 51 U/L (ref 33–136)
ALT SERPL W P-5'-P-CCNC: 9 U/L (ref 10–52)
AMMONIA PLAS-SCNC: 26 UMOL/L (ref 16–53)
AMPHETAMINES UR QL SCN: NORMAL
ANION GAP BLDA CALCULATED.4IONS-SCNC: 2 MMO/L (ref 10–25)
ANION GAP SERPL CALCULATED.3IONS-SCNC: 11 MMOL/L (ref 10–20)
APAP SERPL-MCNC: <10 UG/ML (ref ?–30)
APPEARANCE UR: CLEAR
APTT PPP: 25.1 SECONDS (ref 22–32.5)
ARTERIAL PATENCY WRIST A: POSITIVE
AST SERPL W P-5'-P-CCNC: 16 U/L (ref 9–39)
BARBITURATES UR QL SCN: NORMAL
BASE EXCESS BLDA CALC-SCNC: 3.2 MMOL/L (ref -2–3)
BASOPHILS # BLD AUTO: 0.07 X10*3/UL (ref 0–0.1)
BASOPHILS NFR BLD AUTO: 0.8 %
BENZODIAZ UR QL SCN: NORMAL
BILIRUB SERPL-MCNC: 0.8 MG/DL (ref 0–1.2)
BILIRUB UR STRIP.AUTO-MCNC: NEGATIVE MG/DL
BODY TEMPERATURE: 37 DEGREES CELSIUS
BUN SERPL-MCNC: 12 MG/DL (ref 6–23)
BZE UR QL SCN: NORMAL
CA-I BLDA-SCNC: 1.23 MMOL/L (ref 1.1–1.33)
CALCIUM SERPL-MCNC: 8.9 MG/DL (ref 8.6–10.3)
CANNABINOIDS UR QL SCN: NORMAL
CARDIAC TROPONIN I PNL SERPL HS: 11 NG/L (ref 0–20)
CARDIAC TROPONIN I PNL SERPL HS: 12 NG/L (ref 0–20)
CHLORIDE BLDA-SCNC: 111 MMOL/L (ref 98–107)
CHLORIDE SERPL-SCNC: 106 MMOL/L (ref 98–107)
CO2 SERPL-SCNC: 26 MMOL/L (ref 21–32)
COLOR UR: COLORLESS
CREAT SERPL-MCNC: 0.84 MG/DL (ref 0.5–1.3)
EGFRCR SERPLBLD CKD-EPI 2021: >90 ML/MIN/1.73M*2
EOSINOPHIL # BLD AUTO: 0.24 X10*3/UL (ref 0–0.7)
EOSINOPHIL NFR BLD AUTO: 2.6 %
ERYTHROCYTE [DISTWIDTH] IN BLOOD BY AUTOMATED COUNT: 12.9 % (ref 11.5–14.5)
ETHANOL SERPL-MCNC: <10 MG/DL
FENTANYL+NORFENTANYL UR QL SCN: NORMAL
FLUAV RNA RESP QL NAA+PROBE: NOT DETECTED
FLUBV RNA RESP QL NAA+PROBE: NOT DETECTED
GLUCOSE BLD MANUAL STRIP-MCNC: 102 MG/DL (ref 74–99)
GLUCOSE BLD MANUAL STRIP-MCNC: 106 MG/DL (ref 74–99)
GLUCOSE BLD MANUAL STRIP-MCNC: 109 MG/DL (ref 74–99)
GLUCOSE BLDA-MCNC: 102 MG/DL (ref 74–99)
GLUCOSE SERPL-MCNC: 102 MG/DL (ref 74–99)
GLUCOSE UR STRIP.AUTO-MCNC: NORMAL MG/DL
HCO3 BLDA-SCNC: 28.5 MMOL/L (ref 22–26)
HCT VFR BLD AUTO: 40.7 % (ref 41–52)
HCT VFR BLD EST: 40 % (ref 41–52)
HGB BLD-MCNC: 13.5 G/DL (ref 13.5–17.5)
HGB BLDA-MCNC: 13.4 G/DL (ref 13.5–17.5)
IMM GRANULOCYTES # BLD AUTO: 0.03 X10*3/UL (ref 0–0.7)
IMM GRANULOCYTES NFR BLD AUTO: 0.3 % (ref 0–0.9)
INHALED O2 CONCENTRATION: 40 %
INR PPP: 1.1 (ref 0.9–1.2)
KETONES UR STRIP.AUTO-MCNC: NEGATIVE MG/DL
LACTATE BLDA-SCNC: 0.6 MMOL/L (ref 0.4–2)
LACTATE SERPL-SCNC: 1.2 MMOL/L (ref 0.4–2)
LEUKOCYTE ESTERASE UR QL STRIP.AUTO: NEGATIVE
LYMPHOCYTES # BLD AUTO: 1.66 X10*3/UL (ref 1.2–4.8)
LYMPHOCYTES NFR BLD AUTO: 18.2 %
MAGNESIUM SERPL-MCNC: 1.99 MG/DL (ref 1.6–2.4)
MCH RBC QN AUTO: 32.7 PG (ref 26–34)
MCHC RBC AUTO-ENTMCNC: 33.2 G/DL (ref 32–36)
MCV RBC AUTO: 99 FL (ref 80–100)
METHADONE UR QL SCN: NORMAL
MONOCYTES # BLD AUTO: 0.56 X10*3/UL (ref 0.1–1)
MONOCYTES NFR BLD AUTO: 6.1 %
NEUTROPHILS # BLD AUTO: 6.58 X10*3/UL (ref 1.2–7.7)
NEUTROPHILS NFR BLD AUTO: 72 %
NITRITE UR QL STRIP.AUTO: NEGATIVE
NRBC BLD-RTO: 0 /100 WBCS (ref 0–0)
OPIATES UR QL SCN: NORMAL
OXYCODONE+OXYMORPHONE UR QL SCN: NORMAL
OXYHGB MFR BLDA: 96.7 % (ref 94–98)
PCO2 BLDA: 45 MM HG (ref 38–42)
PCP UR QL SCN: NORMAL
PEEP CMH2O: 5 CM H2O
PH BLDA: 7.41 PH (ref 7.38–7.42)
PH UR STRIP.AUTO: 7 [PH]
PLATELET # BLD AUTO: 163 X10*3/UL (ref 150–450)
PO2 BLDA: 146 MM HG (ref 85–95)
POTASSIUM BLDA-SCNC: 3.7 MMOL/L (ref 3.5–5.3)
POTASSIUM SERPL-SCNC: 3.9 MMOL/L (ref 3.5–5.3)
PROT SERPL-MCNC: 6.7 G/DL (ref 6.4–8.2)
PROT UR STRIP.AUTO-MCNC: NEGATIVE MG/DL
PROTHROMBIN TIME: 11.4 SECONDS (ref 9.3–12.7)
RBC # BLD AUTO: 4.13 X10*6/UL (ref 4.5–5.9)
RBC # UR STRIP.AUTO: NEGATIVE MG/DL
RSV RNA RESP QL NAA+PROBE: NOT DETECTED
SALICYLATES SERPL-MCNC: <3 MG/DL (ref ?–20)
SAO2 % BLDA: 100 % (ref 94–100)
SARS-COV-2 RNA RESP QL NAA+PROBE: NOT DETECTED
SODIUM BLDA-SCNC: 138 MMOL/L (ref 136–145)
SODIUM SERPL-SCNC: 139 MMOL/L (ref 136–145)
SP GR UR STRIP.AUTO: 1.02
SPECIMEN DRAWN FROM PATIENT: ABNORMAL
TIDAL VOLUME: 400 ML
TSH SERPL-ACNC: 1.56 MIU/L (ref 0.44–3.98)
UROBILINOGEN UR STRIP.AUTO-MCNC: NORMAL MG/DL
VENTILATOR MODE: ABNORMAL
VENTILATOR RATE: 15 BPM
WBC # BLD AUTO: 9.1 X10*3/UL (ref 4.4–11.3)

## 2025-04-27 PROCEDURE — 80307 DRUG TEST PRSMV CHEM ANLYZR: CPT | Performed by: CLINICAL NURSE SPECIALIST

## 2025-04-27 PROCEDURE — 2500000001 HC RX 250 WO HCPCS SELF ADMINISTERED DRUGS (ALT 637 FOR MEDICARE OP): Performed by: INTERNAL MEDICINE

## 2025-04-27 PROCEDURE — 82140 ASSAY OF AMMONIA: CPT | Performed by: PSYCHIATRY & NEUROLOGY

## 2025-04-27 PROCEDURE — 99291 CRITICAL CARE FIRST HOUR: CPT | Performed by: CLINICAL NURSE SPECIALIST

## 2025-04-27 PROCEDURE — 71045 X-RAY EXAM CHEST 1 VIEW: CPT

## 2025-04-27 PROCEDURE — 2500000005 HC RX 250 GENERAL PHARMACY W/O HCPCS: Performed by: EMERGENCY MEDICINE

## 2025-04-27 PROCEDURE — 84132 ASSAY OF SERUM POTASSIUM: CPT | Performed by: INTERNAL MEDICINE

## 2025-04-27 PROCEDURE — 84484 ASSAY OF TROPONIN QUANT: CPT | Performed by: CLINICAL NURSE SPECIALIST

## 2025-04-27 PROCEDURE — 36600 WITHDRAWAL OF ARTERIAL BLOOD: CPT

## 2025-04-27 PROCEDURE — 87637 SARSCOV2&INF A&B&RSV AMP PRB: CPT | Performed by: CLINICAL NURSE SPECIALIST

## 2025-04-27 PROCEDURE — 2020000001 HC ICU ROOM DAILY

## 2025-04-27 PROCEDURE — 83735 ASSAY OF MAGNESIUM: CPT | Performed by: CLINICAL NURSE SPECIALIST

## 2025-04-27 PROCEDURE — G0426 INPT/ED TELECONSULT50: HCPCS | Performed by: PSYCHIATRY & NEUROLOGY

## 2025-04-27 PROCEDURE — 94002 VENT MGMT INPAT INIT DAY: CPT

## 2025-04-27 PROCEDURE — 36415 COLL VENOUS BLD VENIPUNCTURE: CPT | Performed by: CLINICAL NURSE SPECIALIST

## 2025-04-27 PROCEDURE — 2500000004 HC RX 250 GENERAL PHARMACY W/ HCPCS (ALT 636 FOR OP/ED): Mod: JZ | Performed by: INTERNAL MEDICINE

## 2025-04-27 PROCEDURE — 80053 COMPREHEN METABOLIC PANEL: CPT | Performed by: CLINICAL NURSE SPECIALIST

## 2025-04-27 PROCEDURE — 83605 ASSAY OF LACTIC ACID: CPT | Performed by: CLINICAL NURSE SPECIALIST

## 2025-04-27 PROCEDURE — 82607 VITAMIN B-12: CPT | Mod: WESLAB | Performed by: PSYCHIATRY & NEUROLOGY

## 2025-04-27 PROCEDURE — 99291 CRITICAL CARE FIRST HOUR: CPT | Performed by: INTERNAL MEDICINE

## 2025-04-27 PROCEDURE — 80143 DRUG ASSAY ACETAMINOPHEN: CPT | Performed by: CLINICAL NURSE SPECIALIST

## 2025-04-27 PROCEDURE — 85730 THROMBOPLASTIN TIME PARTIAL: CPT | Performed by: CLINICAL NURSE SPECIALIST

## 2025-04-27 PROCEDURE — 5A1935Z RESPIRATORY VENTILATION, LESS THAN 24 CONSECUTIVE HOURS: ICD-10-PCS | Performed by: INTERNAL MEDICINE

## 2025-04-27 PROCEDURE — 0BH17EZ INSERTION OF ENDOTRACHEAL AIRWAY INTO TRACHEA, VIA NATURAL OR ARTIFICIAL OPENING: ICD-10-PCS | Performed by: INTERNAL MEDICINE

## 2025-04-27 PROCEDURE — 31500 INSERT EMERGENCY AIRWAY: CPT | Performed by: CLINICAL NURSE SPECIALIST

## 2025-04-27 PROCEDURE — 71045 X-RAY EXAM CHEST 1 VIEW: CPT | Performed by: RADIOLOGY

## 2025-04-27 PROCEDURE — 84443 ASSAY THYROID STIM HORMONE: CPT | Performed by: PSYCHIATRY & NEUROLOGY

## 2025-04-27 PROCEDURE — 99291 CRITICAL CARE FIRST HOUR: CPT | Mod: 25 | Performed by: EMERGENCY MEDICINE

## 2025-04-27 PROCEDURE — 93005 ELECTROCARDIOGRAM TRACING: CPT

## 2025-04-27 PROCEDURE — 2500000005 HC RX 250 GENERAL PHARMACY W/O HCPCS: Performed by: CLINICAL NURSE SPECIALIST

## 2025-04-27 PROCEDURE — 96374 THER/PROPH/DIAG INJ IV PUSH: CPT | Mod: 59

## 2025-04-27 PROCEDURE — 70498 CT ANGIOGRAPHY NECK: CPT

## 2025-04-27 PROCEDURE — 80320 DRUG SCREEN QUANTALCOHOLS: CPT | Performed by: CLINICAL NURSE SPECIALIST

## 2025-04-27 PROCEDURE — 82947 ASSAY GLUCOSE BLOOD QUANT: CPT

## 2025-04-27 PROCEDURE — 94003 VENT MGMT INPAT SUBQ DAY: CPT

## 2025-04-27 PROCEDURE — 2500000005 HC RX 250 GENERAL PHARMACY W/O HCPCS: Performed by: INTERNAL MEDICINE

## 2025-04-27 PROCEDURE — 70498 CT ANGIOGRAPHY NECK: CPT | Performed by: RADIOLOGY

## 2025-04-27 PROCEDURE — 2550000001 HC RX 255 CONTRASTS: Performed by: EMERGENCY MEDICINE

## 2025-04-27 PROCEDURE — 84132 ASSAY OF SERUM POTASSIUM: CPT | Performed by: EMERGENCY MEDICINE

## 2025-04-27 PROCEDURE — 81003 URINALYSIS AUTO W/O SCOPE: CPT | Performed by: CLINICAL NURSE SPECIALIST

## 2025-04-27 PROCEDURE — 70496 CT ANGIOGRAPHY HEAD: CPT | Performed by: RADIOLOGY

## 2025-04-27 PROCEDURE — 70450 CT HEAD/BRAIN W/O DYE: CPT | Performed by: STUDENT IN AN ORGANIZED HEALTH CARE EDUCATION/TRAINING PROGRAM

## 2025-04-27 PROCEDURE — 2500000004 HC RX 250 GENERAL PHARMACY W/ HCPCS (ALT 636 FOR OP/ED): Mod: JZ | Performed by: CLINICAL NURSE SPECIALIST

## 2025-04-27 PROCEDURE — 85610 PROTHROMBIN TIME: CPT | Performed by: CLINICAL NURSE SPECIALIST

## 2025-04-27 PROCEDURE — 85025 COMPLETE CBC W/AUTO DIFF WBC: CPT | Performed by: CLINICAL NURSE SPECIALIST

## 2025-04-27 PROCEDURE — 2500000004 HC RX 250 GENERAL PHARMACY W/ HCPCS (ALT 636 FOR OP/ED): Mod: JZ

## 2025-04-27 PROCEDURE — 70496 CT ANGIOGRAPHY HEAD: CPT

## 2025-04-27 PROCEDURE — 82306 VITAMIN D 25 HYDROXY: CPT | Mod: WESLAB | Performed by: PSYCHIATRY & NEUROLOGY

## 2025-04-27 PROCEDURE — 70450 CT HEAD/BRAIN W/O DYE: CPT

## 2025-04-27 RX ORDER — PANTOPRAZOLE SODIUM 40 MG/10ML
40 INJECTION, POWDER, LYOPHILIZED, FOR SOLUTION INTRAVENOUS
Status: DISCONTINUED | OUTPATIENT
Start: 2025-04-28 | End: 2025-04-28

## 2025-04-27 RX ORDER — NAPROXEN SODIUM 220 MG/1
81 TABLET, FILM COATED ORAL DAILY
Status: DISCONTINUED | OUTPATIENT
Start: 2025-04-27 | End: 2025-05-01 | Stop reason: HOSPADM

## 2025-04-27 RX ORDER — PROPOFOL 10 MG/ML
0-50 INJECTION, EMULSION INTRAVENOUS CONTINUOUS
Status: DISCONTINUED | OUTPATIENT
Start: 2025-04-27 | End: 2025-04-27

## 2025-04-27 RX ORDER — ASPIRIN 81 MG/1
81 TABLET ORAL DAILY
Status: DISCONTINUED | OUTPATIENT
Start: 2025-04-27 | End: 2025-04-27

## 2025-04-27 RX ORDER — MIDAZOLAM HYDROCHLORIDE 5 MG/ML
5 INJECTION, SOLUTION INTRAMUSCULAR; INTRAVENOUS ONCE
Status: COMPLETED | OUTPATIENT
Start: 2025-04-27 | End: 2025-04-27

## 2025-04-27 RX ORDER — FENTANYL CITRATE-0.9 % NACL/PF 10 MCG/ML
0-300 PLASTIC BAG, INJECTION (ML) INTRAVENOUS CONTINUOUS
Status: DISCONTINUED | OUTPATIENT
Start: 2025-04-27 | End: 2025-04-28

## 2025-04-27 RX ORDER — METOPROLOL SUCCINATE 100 MG/1
200 TABLET, EXTENDED RELEASE ORAL DAILY
Status: DISCONTINUED | OUTPATIENT
Start: 2025-04-27 | End: 2025-04-28

## 2025-04-27 RX ORDER — ETOMIDATE 2 MG/ML
20 INJECTION INTRAVENOUS ONCE
Status: COMPLETED | OUTPATIENT
Start: 2025-04-27 | End: 2025-04-27

## 2025-04-27 RX ORDER — MIDAZOLAM HYDROCHLORIDE 1 MG/ML
2 INJECTION, SOLUTION INTRAMUSCULAR; INTRAVENOUS ONCE
Status: COMPLETED | OUTPATIENT
Start: 2025-04-27 | End: 2025-04-27

## 2025-04-27 RX ORDER — ATORVASTATIN CALCIUM 80 MG/1
80 TABLET, FILM COATED ORAL NIGHTLY
Status: DISCONTINUED | OUTPATIENT
Start: 2025-04-27 | End: 2025-04-27

## 2025-04-27 RX ORDER — INSULIN LISPRO 100 [IU]/ML
0-5 INJECTION, SOLUTION INTRAVENOUS; SUBCUTANEOUS EVERY 4 HOURS
Status: DISCONTINUED | OUTPATIENT
Start: 2025-04-27 | End: 2025-04-28

## 2025-04-27 RX ORDER — PROPOFOL 10 MG/ML
5-50 INJECTION, EMULSION INTRAVENOUS CONTINUOUS
Status: DISCONTINUED | OUTPATIENT
Start: 2025-04-27 | End: 2025-04-28

## 2025-04-27 RX ORDER — PROPOFOL 10 MG/ML
50 INJECTION, EMULSION INTRAVENOUS CONTINUOUS
Status: DISCONTINUED | OUTPATIENT
Start: 2025-04-27 | End: 2025-04-27 | Stop reason: HOSPADM

## 2025-04-27 RX ORDER — PANTOPRAZOLE SODIUM 40 MG/1
40 TABLET, DELAYED RELEASE ORAL
Status: DISCONTINUED | OUTPATIENT
Start: 2025-04-28 | End: 2025-04-28

## 2025-04-27 RX ORDER — DEXTROSE 50 % IN WATER (D50W) INTRAVENOUS SYRINGE
12.5
Status: DISCONTINUED | OUTPATIENT
Start: 2025-04-27 | End: 2025-05-01 | Stop reason: HOSPADM

## 2025-04-27 RX ORDER — EZETIMIBE 10 MG/1
10 TABLET ORAL DAILY
Status: DISCONTINUED | OUTPATIENT
Start: 2025-04-28 | End: 2025-04-28

## 2025-04-27 RX ORDER — DEXTROSE MONOHYDRATE AND SODIUM CHLORIDE 5; .9 G/100ML; G/100ML
75 INJECTION, SOLUTION INTRAVENOUS CONTINUOUS
Status: DISCONTINUED | OUTPATIENT
Start: 2025-04-27 | End: 2025-04-28

## 2025-04-27 RX ORDER — METOPROLOL SUCCINATE 50 MG/1
50 TABLET, EXTENDED RELEASE ORAL DAILY
Status: DISCONTINUED | OUTPATIENT
Start: 2025-04-27 | End: 2025-04-29

## 2025-04-27 RX ORDER — SUCCINYLCHOLINE CHLORIDE 20 MG/ML
100 INJECTION INTRAMUSCULAR; INTRAVENOUS ONCE
Status: COMPLETED | OUTPATIENT
Start: 2025-04-27 | End: 2025-04-27

## 2025-04-27 RX ORDER — NALOXONE HYDROCHLORIDE 1 MG/ML
INJECTION INTRAMUSCULAR; INTRAVENOUS; SUBCUTANEOUS
Status: COMPLETED
Start: 2025-04-27 | End: 2025-04-27

## 2025-04-27 RX ORDER — HEPARIN SODIUM 5000 [USP'U]/ML
5000 INJECTION, SOLUTION INTRAVENOUS; SUBCUTANEOUS EVERY 8 HOURS
Status: DISCONTINUED | OUTPATIENT
Start: 2025-04-27 | End: 2025-04-28

## 2025-04-27 RX ORDER — TICAGRELOR 90 MG/1
90 TABLET, FILM COATED ORAL 2 TIMES DAILY
Status: DISCONTINUED | OUTPATIENT
Start: 2025-04-27 | End: 2025-04-29

## 2025-04-27 RX ORDER — ATORVASTATIN CALCIUM 80 MG/1
80 TABLET, FILM COATED ORAL NIGHTLY
Status: DISCONTINUED | OUTPATIENT
Start: 2025-04-27 | End: 2025-05-01 | Stop reason: HOSPADM

## 2025-04-27 RX ORDER — NALOXONE HYDROCHLORIDE 1 MG/ML
2 INJECTION INTRAMUSCULAR; INTRAVENOUS; SUBCUTANEOUS ONCE
Status: COMPLETED | OUTPATIENT
Start: 2025-04-27 | End: 2025-04-27

## 2025-04-27 RX ORDER — LOSARTAN POTASSIUM 100 MG/1
100 TABLET ORAL DAILY
Status: DISCONTINUED | OUTPATIENT
Start: 2025-04-27 | End: 2025-04-29

## 2025-04-27 RX ORDER — MIDAZOLAM HYDROCHLORIDE 1 MG/ML
.5-2 INJECTION, SOLUTION INTRAVENOUS CONTINUOUS
Status: DISCONTINUED | OUTPATIENT
Start: 2025-04-27 | End: 2025-04-27 | Stop reason: HOSPADM

## 2025-04-27 RX ORDER — DEXTROSE 50 % IN WATER (D50W) INTRAVENOUS SYRINGE
25
Status: DISCONTINUED | OUTPATIENT
Start: 2025-04-27 | End: 2025-05-01 | Stop reason: HOSPADM

## 2025-04-27 RX ORDER — EZETIMIBE 10 MG/1
10 TABLET ORAL DAILY
Status: DISCONTINUED | OUTPATIENT
Start: 2025-04-27 | End: 2025-04-27

## 2025-04-27 RX ORDER — PROPOFOL 10 MG/ML
0-50 INJECTION, EMULSION INTRAVENOUS CONTINUOUS
Status: DISCONTINUED | OUTPATIENT
Start: 2025-04-27 | End: 2025-04-27 | Stop reason: HOSPADM

## 2025-04-27 RX ADMIN — MIDAZOLAM IN SODIUM CHLORIDE 1.5 MG/HR: 1 INJECTION INTRAVENOUS at 12:31

## 2025-04-27 RX ADMIN — NALOXONE HYDROCHLORIDE 2 MG: 1 INJECTION PARENTERAL at 10:33

## 2025-04-27 RX ADMIN — Medication 40 PERCENT: at 14:22

## 2025-04-27 RX ADMIN — PROPOFOL 15 MCG/KG/MIN: 10 INJECTION, EMULSION INTRAVENOUS at 15:28

## 2025-04-27 RX ADMIN — MIDAZOLAM IN SODIUM CHLORIDE 1 MG/HR: 1 INJECTION INTRAVENOUS at 12:03

## 2025-04-27 RX ADMIN — PROPOFOL 10 MCG/KG/MIN: 10 INJECTION, EMULSION INTRAVENOUS at 11:00

## 2025-04-27 RX ADMIN — Medication 40 PERCENT: at 11:08

## 2025-04-27 RX ADMIN — PROPOFOL 50 MCG/KG/MIN: 10 INJECTION, EMULSION INTRAVENOUS at 11:30

## 2025-04-27 RX ADMIN — ATORVASTATIN CALCIUM 80 MG: 80 TABLET, FILM COATED ORAL at 22:17

## 2025-04-27 RX ADMIN — NALOXONE HYDROCHLORIDE 2 MG: 1 INJECTION PARENTERAL at 10:35

## 2025-04-27 RX ADMIN — MIDAZOLAM 2 MG: 1 INJECTION INTRAMUSCULAR; INTRAVENOUS at 13:15

## 2025-04-27 RX ADMIN — ETOMIDATE 20 MG: 40 INJECTION, SOLUTION INTRAVENOUS at 10:47

## 2025-04-27 RX ADMIN — MIDAZOLAM HYDROCHLORIDE 5 MG: 5 INJECTION, SOLUTION INTRAMUSCULAR; INTRAVENOUS at 11:13

## 2025-04-27 RX ADMIN — HEPARIN SODIUM 5000 UNITS: 5000 INJECTION, SOLUTION INTRAVENOUS; SUBCUTANEOUS at 22:23

## 2025-04-27 RX ADMIN — ASPIRIN 81 MG: 81 TABLET, CHEWABLE ORAL at 18:46

## 2025-04-27 RX ADMIN — Medication 25 MCG/HR: at 15:26

## 2025-04-27 RX ADMIN — MIDAZOLAM IN SODIUM CHLORIDE 2 MG/HR: 1 INJECTION INTRAVENOUS at 13:14

## 2025-04-27 RX ADMIN — IOHEXOL 75 ML: 350 INJECTION, SOLUTION INTRAVENOUS at 10:28

## 2025-04-27 RX ADMIN — Medication 40 L/MIN: at 16:47

## 2025-04-27 RX ADMIN — HEPARIN SODIUM 5000 UNITS: 5000 INJECTION, SOLUTION INTRAVENOUS; SUBCUTANEOUS at 14:47

## 2025-04-27 RX ADMIN — Medication 40 PERCENT: at 20:16

## 2025-04-27 RX ADMIN — PSYLLIUM HUSK 1 PACKET: 3.4 POWDER ORAL at 18:46

## 2025-04-27 RX ADMIN — SUCCINYLCHOLINE CHLORIDE 100 MG: 20 INJECTION, SOLUTION INTRAMUSCULAR; INTRAVENOUS at 10:48

## 2025-04-27 RX ADMIN — PROPOFOL 10 MCG/KG/MIN: 10 INJECTION, EMULSION INTRAVENOUS at 13:34

## 2025-04-27 RX ADMIN — DEXTROSE MONOHYDRATE AND SODIUM CHLORIDE 75 ML/HR: 5; .9 INJECTION, SOLUTION INTRAVENOUS at 14:47

## 2025-04-27 SDOH — ECONOMIC STABILITY: HOUSING INSECURITY: IN THE PAST 12 MONTHS, HOW MANY TIMES HAVE YOU MOVED WHERE YOU WERE LIVING?: 1

## 2025-04-27 SDOH — HEALTH STABILITY: MENTAL HEALTH: HOW OFTEN DO YOU HAVE SIX OR MORE DRINKS ON ONE OCCASION?: PATIENT UNABLE TO ANSWER

## 2025-04-27 SDOH — HEALTH STABILITY: PHYSICAL HEALTH
HOW OFTEN DO YOU NEED TO HAVE SOMEONE HELP YOU WHEN YOU READ INSTRUCTIONS, PAMPHLETS, OR OTHER WRITTEN MATERIAL FROM YOUR DOCTOR OR PHARMACY?: PATIENT UNABLE TO RESPOND

## 2025-04-27 SDOH — HEALTH STABILITY: MENTAL HEALTH: HOW OFTEN DO YOU HAVE A DRINK CONTAINING ALCOHOL?: PATIENT UNABLE TO ANSWER

## 2025-04-27 SDOH — SOCIAL STABILITY: SOCIAL INSECURITY
WITHIN THE LAST YEAR, HAVE YOU BEEN HUMILIATED OR EMOTIONALLY ABUSED IN OTHER WAYS BY YOUR PARTNER OR EX-PARTNER?: PATIENT UNABLE TO ANSWER

## 2025-04-27 SDOH — SOCIAL STABILITY: SOCIAL NETWORK
DO YOU BELONG TO ANY CLUBS OR ORGANIZATIONS SUCH AS CHURCH GROUPS, UNIONS, FRATERNAL OR ATHLETIC GROUPS, OR SCHOOL GROUPS?: PATIENT UNABLE TO ANSWER

## 2025-04-27 SDOH — ECONOMIC STABILITY: INCOME INSECURITY
IN THE PAST 12 MONTHS HAS THE ELECTRIC, GAS, OIL, OR WATER COMPANY THREATENED TO SHUT OFF SERVICES IN YOUR HOME?: PATIENT UNABLE TO ANSWER

## 2025-04-27 SDOH — ECONOMIC STABILITY: FOOD INSECURITY
WITHIN THE PAST 12 MONTHS, YOU WORRIED THAT YOUR FOOD WOULD RUN OUT BEFORE YOU GOT THE MONEY TO BUY MORE.: PATIENT UNABLE TO ANSWER

## 2025-04-27 SDOH — ECONOMIC STABILITY: TRANSPORTATION INSECURITY
IN THE PAST 12 MONTHS, HAS LACK OF TRANSPORTATION KEPT YOU FROM MEDICAL APPOINTMENTS OR FROM GETTING MEDICATIONS?: PATIENT UNABLE TO ANSWER

## 2025-04-27 SDOH — SOCIAL STABILITY: SOCIAL INSECURITY
WITHIN THE LAST YEAR, HAVE YOU BEEN KICKED, HIT, SLAPPED, OR OTHERWISE PHYSICALLY HURT BY YOUR PARTNER OR EX-PARTNER?: PATIENT UNABLE TO ANSWER

## 2025-04-27 SDOH — SOCIAL STABILITY: SOCIAL NETWORK: HOW OFTEN DO YOU ATTEND CHURCH OR RELIGIOUS SERVICES?: PATIENT UNABLE TO ANSWER

## 2025-04-27 SDOH — SOCIAL STABILITY: SOCIAL INSECURITY: WITHIN THE LAST YEAR, HAVE YOU BEEN AFRAID OF YOUR PARTNER OR EX-PARTNER?: PATIENT UNABLE TO ANSWER

## 2025-04-27 SDOH — SOCIAL STABILITY: SOCIAL NETWORK: HOW OFTEN DO YOU GET TOGETHER WITH FRIENDS OR RELATIVES?: PATIENT UNABLE TO ANSWER

## 2025-04-27 SDOH — HEALTH STABILITY: PHYSICAL HEALTH
ON AVERAGE, HOW MANY DAYS PER WEEK DO YOU ENGAGE IN MODERATE TO STRENUOUS EXERCISE (LIKE A BRISK WALK)?: PATIENT UNABLE TO ANSWER

## 2025-04-27 SDOH — ECONOMIC STABILITY: HOUSING INSECURITY: AT ANY TIME IN THE PAST 12 MONTHS, WERE YOU HOMELESS OR LIVING IN A SHELTER (INCLUDING NOW)?: PATIENT UNABLE TO ANSWER

## 2025-04-27 SDOH — ECONOMIC STABILITY: HOUSING INSECURITY
IN THE LAST 12 MONTHS, WAS THERE A TIME WHEN YOU WERE NOT ABLE TO PAY THE MORTGAGE OR RENT ON TIME?: PATIENT UNABLE TO ANSWER

## 2025-04-27 SDOH — SOCIAL STABILITY: SOCIAL INSECURITY: ARE YOU MARRIED, WIDOWED, DIVORCED, SEPARATED, NEVER MARRIED, OR LIVING WITH A PARTNER?: PATIENT UNABLE TO ANSWER

## 2025-04-27 SDOH — SOCIAL STABILITY: SOCIAL NETWORK: HOW OFTEN DO YOU ATTEND MEETINGS OF THE CLUBS OR ORGANIZATIONS YOU BELONG TO?: PATIENT UNABLE TO ANSWER

## 2025-04-27 SDOH — ECONOMIC STABILITY: FOOD INSECURITY
WITHIN THE PAST 12 MONTHS, THE FOOD YOU BOUGHT JUST DIDN'T LAST AND YOU DIDN'T HAVE MONEY TO GET MORE.: PATIENT UNABLE TO ANSWER

## 2025-04-27 SDOH — HEALTH STABILITY: PHYSICAL HEALTH: ON AVERAGE, HOW MANY MINUTES DO YOU ENGAGE IN EXERCISE AT THIS LEVEL?: PATIENT UNABLE TO ANSWER

## 2025-04-27 SDOH — SOCIAL STABILITY: SOCIAL INSECURITY
WITHIN THE LAST YEAR, HAVE YOU BEEN RAPED OR FORCED TO HAVE ANY KIND OF SEXUAL ACTIVITY BY YOUR PARTNER OR EX-PARTNER?: PATIENT UNABLE TO ANSWER

## 2025-04-27 SDOH — ECONOMIC STABILITY: FOOD INSECURITY
HOW HARD IS IT FOR YOU TO PAY FOR THE VERY BASICS LIKE FOOD, HOUSING, MEDICAL CARE, AND HEATING?: PATIENT UNABLE TO ANSWER

## 2025-04-27 SDOH — HEALTH STABILITY: MENTAL HEALTH: HOW MANY DRINKS CONTAINING ALCOHOL DO YOU HAVE ON A TYPICAL DAY WHEN YOU ARE DRINKING?: PATIENT UNABLE TO ANSWER

## 2025-04-27 SDOH — HEALTH STABILITY: MENTAL HEALTH
DO YOU FEEL STRESS - TENSE, RESTLESS, NERVOUS, OR ANXIOUS, OR UNABLE TO SLEEP AT NIGHT BECAUSE YOUR MIND IS TROUBLED ALL THE TIME - THESE DAYS?: PATIENT UNABLE TO ANSWER

## 2025-04-27 SDOH — SOCIAL STABILITY: SOCIAL NETWORK: IN A TYPICAL WEEK, HOW MANY TIMES DO YOU TALK ON THE PHONE WITH FAMILY, FRIENDS, OR NEIGHBORS?: PATIENT UNABLE TO ANSWER

## 2025-04-27 SDOH — SOCIAL STABILITY: SOCIAL INSECURITY: WERE YOU ABLE TO COMPLETE ALL THE BEHAVIORAL HEALTH SCREENINGS?: NO

## 2025-04-27 ASSESSMENT — ACTIVITIES OF DAILY LIVING (ADL)
PATIENT'S MEMORY ADEQUATE TO SAFELY COMPLETE DAILY ACTIVITIES?: UNABLE TO ASSESS
LACK_OF_TRANSPORTATION: PATIENT UNABLE TO ANSWER
FEEDING YOURSELF: UNABLE TO ASSESS
HEARING - RIGHT EAR: UNABLE TO ASSESS
BATHING: UNABLE TO ASSESS
ADEQUATE_TO_COMPLETE_ADL: UNABLE TO ASSESS
TOILETING: UNABLE TO ASSESS
JUDGMENT_ADEQUATE_SAFELY_COMPLETE_DAILY_ACTIVITIES: UNABLE TO ASSESS
GROOMING: UNABLE TO ASSESS
WALKS IN HOME: UNABLE TO ASSESS
LACK_OF_TRANSPORTATION: PATIENT UNABLE TO ANSWER
HEARING - LEFT EAR: UNABLE TO ASSESS
DRESSING YOURSELF: UNABLE TO ASSESS

## 2025-04-27 ASSESSMENT — PAIN - FUNCTIONAL ASSESSMENT
PAIN_FUNCTIONAL_ASSESSMENT: FLACC (FACE, LEGS, ACTIVITY, CRY, CONSOLABILITY)
PAIN_FUNCTIONAL_ASSESSMENT: CPOT (CRITICAL CARE PAIN OBSERVATION TOOL)

## 2025-04-27 ASSESSMENT — LIFESTYLE VARIABLES
SKIP TO QUESTIONS 9-10: 0
AUDIT-C TOTAL SCORE: -1

## 2025-04-27 ASSESSMENT — COLUMBIA-SUICIDE SEVERITY RATING SCALE - C-SSRS
1. IN THE PAST MONTH, HAVE YOU WISHED YOU WERE DEAD OR WISHED YOU COULD GO TO SLEEP AND NOT WAKE UP?: NO
6. HAVE YOU EVER DONE ANYTHING, STARTED TO DO ANYTHING, OR PREPARED TO DO ANYTHING TO END YOUR LIFE?: NO
2. HAVE YOU ACTUALLY HAD ANY THOUGHTS OF KILLING YOURSELF?: NO

## 2025-04-27 ASSESSMENT — COGNITIVE AND FUNCTIONAL STATUS - GENERAL: PATIENT BASELINE BEDBOUND: UNABLE TO ASSESS AT THIS TIME

## 2025-04-27 NOTE — CONSULTS
"Inpatient consult to Neuro TeleStroke  Consult performed by: Mackenzie Paz MD  Consult ordered by: AARON Coats-NITA      Page time: 10:38 am  Call return: 10:40 am  Provider in video: 10: 44 am (patient being intubated)  Patient in video: 10:44 am      History Of Present Illness:  Historian: ED Provider  and chart  Mtaeo Woodall is a 64 y.o. male presenting with obtundation.  He has Atrial fibrillation, Heart failure with reduced ejection fraction (30-35% in 7/24), Coronary artery disease s/p stent, s/p Coronary bypass graft surgery, hypertension and hyperlipidemia.  He was supposed to be on Brilinta and aspirin but recent phone messages indicate he was unable to afford Brilinta and not taking it.  He was reported to be normal this AM, and went back to bed (time unknown) and then could not be roused.  He was given narcan with minimal effect per report.    Last known well: this AM, time unknown  Had stroke symptoms resolved at time of presentation: No   Current antiplatelet/anticoagulant use: Aspirin; not taking brilinta on 4/11.    Prior Functional Status (Modified Payal Scale):  Unclear, but reported having difficulty climbing stairs in 9/24 due to claudication.    Stroke Risk Factors:  Hypertension, Hyperlipidemia, Atrial Fibrillation, H/O CAD, and Smoking    Last Recorded Vitals:  Blood pressure 155/77, pulse 90, temperature 36.7 °C (98 °F), temperature source Temporal, resp. rate (!) 26, height 1.702 m (5' 7\"), weight 55.2 kg (121 lb 11.1 oz), SpO2 98%.    Exam:  Limited by the fact that he is being intubated and due to positioning the face cannot be seen.  He is reported to have deviation of the right eye.    Patient is obtunded with minimal movement of the limbs to sternal rub but no verbal response.  No response to pinch of the upper extremities but equal withdrawal of both legs prior to intubation.  Later after intubation noted to lift up the right arm several times spontaneously but no " spontaneous movement of the left arm.  Was noted to gag on the NT tube.      NIHSS (per [facility] prior to intubation)  1A. Level of Consciousness: 3  1B. Ask Month and Age: 2  1C. Blink Eyes & Squeeze Hands: 2  2. Best Gaze: 1  3. Visual: 0  4. Facial Palsy: 0  5A. Motor - Left Arm: 3  5B. Motor - Right Arm: 3  6A. Motor - Left Leg: 3  6B. Motor - Right Leg: 3  7. Limb Ataxia: 0  8. Sensory Loss: 0  9. Best Language: 3  10. Dysarthria: 0  11. Extinction and Inattention: 0  NIH Stroke Scale: 23       Relevant Results:  LABS:  INR   Date Value Ref Range Status   04/27/2025 1.1 0.9 - 1.2 Final      Results for orders placed or performed during the hospital encounter of 04/27/25 (from the past 24 hours)   CBC and Auto Differential   Result Value Ref Range    WBC 9.1 4.4 - 11.3 x10*3/uL    nRBC 0.0 0.0 - 0.0 /100 WBCs    RBC 4.13 (L) 4.50 - 5.90 x10*6/uL    Hemoglobin 13.5 13.5 - 17.5 g/dL    Hematocrit 40.7 (L) 41.0 - 52.0 %    MCV 99 80 - 100 fL    MCH 32.7 26.0 - 34.0 pg    MCHC 33.2 32.0 - 36.0 g/dL    RDW 12.9 11.5 - 14.5 %    Platelets 163 150 - 450 x10*3/uL    Neutrophils % 72.0 40.0 - 80.0 %    Immature Granulocytes %, Automated 0.3 0.0 - 0.9 %    Lymphocytes % 18.2 13.0 - 44.0 %    Monocytes % 6.1 2.0 - 10.0 %    Eosinophils % 2.6 0.0 - 6.0 %    Basophils % 0.8 0.0 - 2.0 %    Neutrophils Absolute 6.58 1.20 - 7.70 x10*3/uL    Immature Granulocytes Absolute, Automated 0.03 0.00 - 0.70 x10*3/uL    Lymphocytes Absolute 1.66 1.20 - 4.80 x10*3/uL    Monocytes Absolute 0.56 0.10 - 1.00 x10*3/uL    Eosinophils Absolute 0.24 0.00 - 0.70 x10*3/uL    Basophils Absolute 0.07 0.00 - 0.10 x10*3/uL   Protime-INR   Result Value Ref Range    Protime 11.4 9.3 - 12.7 seconds    INR 1.1 0.9 - 1.2   APTT   Result Value Ref Range    aPTT 25.1 22.0 - 32.5 seconds   Lactate   Result Value Ref Range    Lactate 1.2 0.4 - 2.0 mmol/L   Urinalysis with Reflex Culture and Microscopic   Result Value Ref Range    Color, Urine Colorless (N)  Light-Yellow, Yellow, Dark-Yellow    Appearance, Urine Clear Clear    Specific Gravity, Urine 1.019 1.005 - 1.035    pH, Urine 7.0 5.0, 5.5, 6.0, 6.5, 7.0, 7.5, 8.0    Protein, Urine NEGATIVE NEGATIVE, 10 (TRACE), 20 (TRACE) mg/dL    Glucose, Urine Normal Normal mg/dL    Blood, Urine NEGATIVE NEGATIVE mg/dL    Ketones, Urine NEGATIVE NEGATIVE mg/dL    Bilirubin, Urine NEGATIVE NEGATIVE mg/dL    Urobilinogen, Urine Normal Normal mg/dL    Nitrite, Urine NEGATIVE NEGATIVE    Leukocyte Esterase, Urine NEGATIVE NEGATIVE        CT Head Imaging:  CTH imaging personally reviewed, showed no acute ischemic / hemorrhagic changes     CTA Head and Neck Imaging:  CTA head and neck imaging personally reviewed, no large vessel occlusion or severe stenosis seen    CT Perfusion Imaging:  Not done    Assessment:  Obtundation, etiology unclear.  Differential diaganosis includes, infection, metabolic encephalopathy, embolic shower to the brain without large vessel occlusion, post ictal.   Recommend work up for infectious, metabolic causes of altered mental status.  If there is no obvious cause rec MRI, EEG.    IV Thrombolysis IV Thrombolysis Checklist        IV Thrombolysis Given: No; Thrombolysis contraindication reason: Time from Last Known Well (or stroke onset) is >4.5 hours           Patient is a candidate for thrombectomy:  yes/no: No; contraindication reason: No evidence of proximal occlusion    Additional Recommendations:  If no other cause of obtundation: MRI Brain w/o contrast stroke protocol or repeat CTH 24 hours after initial CTH if unable to get MRI.  ASA 81mg daily; may need to be adjusted based on MRI results, final diagnosis.  Note supposed to be on asa and Brilinta at home but cannot afford Brilinta  Lipid Goals: education on healthy diet and statin therapy to maintain or achieve goal LDL-cholesterol < 70mg. Atorvastatin 80mg..  Blood pressure goals: avoid hypotension SBP <100 that could worsen cerebral perfusion.  .  Glucose Goals: early treatment of hyperglycemia to goal glucose 140-180 mg/dl with long-term goal A1c < 7%.  NPO until nurse bedside swallow assessment.  Consider focused stroke order set. If he has a stroke  Smoking Cessation and Education.  Assessment for Rehabilitation needs.  Patient and family education on signs and symptoms of stroke, calling 911, healthy strategies for stroke prevention.      Disposition:  Patient will remain at referring facility for further evaluation and management.    Virtual or Telephone Consent    An interactive audio and video telecommunication system which permits real time communications between the patient (at the originating site) and provider (at the distant site) was utilized to provide this telehealth service.   The patient was unable to consent and there was no next of kin available to provide consent. Due to the emergent nature of the patient's medical problem the provider team deemed it medically necessary to proceed with the telehealth visit.     Telestroke is covered in shift work. If there are further Neurological questions or concerns please contact your regional neurologist on call during daytime hours or contact the transfer center with an ADT20 order.    Mackenzie Paz MD

## 2025-04-27 NOTE — CONSULTS
Inpatient consult to Neurology  Consult performed by: Livia Mandel MD  Consult ordered by: Christi Disla MD          History Of Present Illness  Mateo Woodall is a 64 y.o. male presenting with altered mental status.  His family at bedside report he does not use recereational drugs and uses at most moderate EtOH.  He was in his usual state of health two nights ago.  Then he was found in his bed unresponsive, even after eval in the ED and after intubation.  By this afternoon he is waking up and following commands.       Past Medical History  He has a past medical history of Acute myocardial infarction (07/07/2023), Alcohol dependence (10/30/2023), Atrial fibrillation (Multi) (10/30/2023), Benign hypertensive heart disease without congestive heart failure (09/04/2023), Congestive heart failure (09/30/2023), Coronary arteriosclerosis in native artery (07/07/2023), Coronary artery disease, Essential hypertension (09/30/2023), High blood cholesterol, HTN (hypertension), Hyperlipidemia (09/04/2023), Impaired perfusion of peripheral tissue (10/30/2023), MI (myocardial infarction) (Multi) (2010), MI (myocardial infarction) (Multi), Postsurgical percutaneous transluminal coronary angioplasty status (09/04/2023), Stenosis of coronary artery stent (07/07/2023), Stricture of artery (CMS-HCC) (08/29/2023), Tobacco dependence (09/04/2023), and VT (ventricular tachycardia) (Multi) (07/07/2023).    Surgical History  He has a past surgical history that includes Coronary artery bypass graft; orthopedic surgery; orthopedic surgery; Coronary angioplasty with stent; and Coronary angioplasty with stent.     Social History  He reports that he quit smoking about 18 months ago. His smoking use included cigarettes. He started smoking about 14 months ago. He has a 17.5 pack-year smoking history. He has been exposed to tobacco smoke. He has never used smokeless tobacco. He reports that he does not currently use alcohol. He reports that  "he does not use drugs.     Allergies  Patient has no known allergies.    Medications  Prescriptions Prior to Admission[1]  Review of Systems    Scheduled medications  Scheduled Medications[2]  Continuous medications  Continuous Medications[3]  PRN medications  PRN Medications[4]    Last Recorded Vitals   Blood pressure 142/79, pulse 99, temperature 36.6 °C (97.9 °F), temperature source Oral, resp. rate 20, height 1.702 m (5' 7\"), weight 60 kg (132 lb 4.4 oz), SpO2 100%.    Heart is RRR, Lungs CTAB  Neurologically, pt is intubated and sedated.  With propofol and fentanyl held he is following commands and asking questions with pantomime.      Relevant Results    Results for orders placed or performed during the hospital encounter of 04/27/25 (from the past 96 hours)   POCT GLUCOSE   Result Value Ref Range    POCT Glucose 106 (H) 74 - 99 mg/dL   Blood Gas Arterial Full Panel   Result Value Ref Range    POCT pH, Arterial 7.41 7.38 - 7.42 pH    POCT pCO2, Arterial 45 (H) 38 - 42 mm Hg    POCT pO2, Arterial 146 (H) 85 - 95 mm Hg    POCT SO2, Arterial 100 94 - 100 %    POCT Oxy Hemoglobin, Arterial 96.7 94.0 - 98.0 %    POCT Hematocrit Calculated, Arterial 40.0 (L) 41.0 - 52.0 %    POCT Sodium, Arterial 138 136 - 145 mmol/L    POCT Potassium, Arterial 3.7 3.5 - 5.3 mmol/L    POCT Chloride, Arterial 111 (H) 98 - 107 mmol/L    POCT Ionized Calcium, Arterial 1.23 1.10 - 1.33 mmol/L    POCT Glucose, Arterial 102 (H) 74 - 99 mg/dL    POCT Lactate, Arterial 0.6 0.4 - 2.0 mmol/L    POCT Base Excess, Arterial 3.2 (H) -2.0 - 3.0 mmol/L    POCT HCO3 Calculated, Arterial 28.5 (H) 22.0 - 26.0 mmol/L    POCT Hemoglobin, Arterial 13.4 (L) 13.5 - 17.5 g/dL    POCT Anion Gap, Arterial 2 (L) 10 - 25 mmo/L    Patient Temperature 37.0 degrees Celsius    FiO2 40 %    Ventilator Mode Other     Ventilator Rate 15 bpm    Tidal Volume 400 mL    Peep CHM2O 5.0 cm H2O    Site of Arterial Puncture Radial Left     Andres's Test Positive     "     Imaging  XR chest 1 view  Result Date: 4/27/2025  Satisfactory positioning of the endotracheal tube and nasogastric tube.   Postoperative findings without acute cardiopulmonary process.   Signed by: Abbey Peterson 4/27/2025 4:25 PM Dictation workstation:   [id number]    XR chest 1 view  Result Date: 4/27/2025  Support devices as above. No focal infiltrate.   MACRO: None.   Signed by: Obdulia Michaud 4/27/2025 12:09 PM Dictation workstation:   [id number]    CT brain attack angio head and neck W and WO IV contrast  Result Date: 4/27/2025  Findings suggesting potential thrombosis rather than hypoplasia of the right P1 posterior cerebral artery with distal reconstitution via a small right posterior communicating artery. Diminutive and thready appearance of the right distal vertebral artery beyond the right V3 V4 junction. Please correlate clinically with presenting complaints and consider MRI/MRA for instance for further evaluation if clinically indicated.   MACRO: Ismael Crowley discussed the significance and urgency of this critical finding by telephone with  Dr. Castro on 4/27/2025 at 10:49 am. (**-RCF-**) Findings:  See findings.   Signed by: Ismael Crowley 4/27/2025 10:59 AM Dictation workstation:   [id number]    CT brain attack head wo IV contrast  Result Date: 4/27/2025  No CT evidence of acute large vessel territory infarct or intracranial hemorrhage. Please correlate with the separately reported CT angiogram and consider MRI as warranted.   MACRO: Salazar Ko discussed the significance and urgency of this critical finding by Epic secure chat with  ZAKIA ALEXANDRE on 4/27/2025 at 10:40 am.  (**-RCF-**) Findings:  See findings.   Signed by: Salazar Ko 4/27/2025 10:57 AM Dictation workstation:   [id number]      Cardiology, Vascular, and Other Imaging  No other imaging results found for the past 7 days        Assessment/Plan   Assessment & Plan  AMS (altered mental status)    63 yo M with altered mental status of  uncertain etiology.  Given the speed with which he his recovering, I suspect possible hypercapnia.  ABG was not done early.  Seizure, of course, is a possibility.  Will get MRI and EEG and reassess tomorrow.  His exam is currently nonfocal and I have a low suspicion for stroke.      I personally spent 60 minutes today, exclusive of procedures, providing care for this patient, including preparation, face to face time, documentation and other services such as review of medical records, diagnostic result, patient education, counseling, coordination of care as specified in the encounter.        [1]   Medications Prior to Admission   Medication Sig Dispense Refill Last Dose/Taking    aspirin 81 mg EC tablet TAKE 1 TABLET BY MOUTH DAILY HOLD IF GIVEN IN LAST 24 HOURS OR IF HISTORY OF HYPERSENSITIVITY. 90 tablet 3     atorvastatin (Lipitor) 80 mg tablet Take 1 tablet (80 mg) by mouth once daily at bedtime. 90 tablet 3     Brilinta 90 mg tablet TAKE 1 TABLET (90 MG) BY MOUTH 2 TIMES A DAY. 180 tablet 3     ezetimibe (Zetia) 10 mg tablet TAKE 1 TABLET BY MOUTH EVERY DAY 90 tablet 3     losartan (Cozaar) 100 mg tablet Take 1 tablet (100 mg) by mouth once daily. 90 tablet 3     metoprolol succinate XL (Toprol-XL) 200 mg 24 hr tablet Take 1 tablet (200 mg) by mouth once daily. 90 tablet 3     metoprolol succinate XL (Toprol-XL) 50 mg 24 hr tablet Take 1 tablet (50 mg) by mouth once daily. 90 tablet 3     nitroglycerin (Nitrostat) 0.4 mg SL tablet Take 1 tablet by mouth sublingual as needed for chest pain give up to 3 doses hold of sbp is less than 90. Notify physician. 25 tablet 0     traMADol (Ultram) 50 mg tablet Take 1 tablet (50 mg) by mouth every 6 hours if needed.       varenicline (Chantix BEATRIZ) 0.5 mg (11)- 1 mg (42) tablet USE AS DIRECTED ON PACKAGE INSTRUCTIONS, TRY TO QUIT SMOKING AFTER 1 WEEK 53 each 0     varenicline (Chantix) 1 mg tablet Take 1 tablet (1 mg) by mouth 2 times a day. Take with full glass of water. 60  tablet 1    [2] aspirin, 81 mg, orogastric tube, Daily  atorvastatin, 80 mg, orogastric tube, Nightly  [START ON 4/28/2025] ezetimibe, 10 mg, orogastric tube, Daily  fentaNYL, 25 mcg, intravenous, Once  heparin (porcine), 5,000 Units, subcutaneous, q8h  insulin lispro, 0-5 Units, subcutaneous, q4h  [Held by provider] losartan, 100 mg, oral, Daily  [Held by provider] metoprolol succinate XL, 200 mg, oral, Daily  [Held by provider] metoprolol succinate XL, 50 mg, oral, Daily  oxygen, , inhalation, Continuous - Inhalation  [START ON 4/28/2025] pantoprazole, 40 mg, oral, Daily before breakfast   Or  [START ON 4/28/2025] pantoprazole, 40 mg, intravenous, Daily before breakfast  psyllium, 1 packet, oral, Daily  [Held by provider] ticagrelor, 90 mg, oral, BID  [3] D5 % and 0.9 % sodium chloride, 75 mL/hr, Last Rate: 75 mL/hr (04/27/25 1828)  fentaNYL, 0-300 mcg/hr, Last Rate: 75 mcg/hr (04/27/25 1828)  propofol, 5-50 mcg/kg/min, Last Rate: 30 mcg/kg/min (04/27/25 1828)  [4] PRN medications: dextrose, dextrose, fentaNYL, glucagon, glucagon

## 2025-04-27 NOTE — CARE PLAN
The patient's goals for the shift include      The clinical goals for the shift include neuro consult for further management of condition    Over the shift, the patient did not make progress toward the following goals. Barriers to progression include . Recommendations to address these barriers include .

## 2025-04-27 NOTE — CARE PLAN
Patient intubated in ED 11 with a size 8 tube (24 at lip) and placed on the mechanical ventilator per orders

## 2025-04-27 NOTE — ED PROCEDURE NOTE
Procedure  Critical Care    Performed by: Savannah Castro MD  Authorized by: Savannah Castro MD    Critical care provider statement:     Critical care time (minutes):  33    Critical care time was exclusive of:  Teaching time and separately billable procedures and treating other patients    Critical care was necessary to treat or prevent imminent or life-threatening deterioration of the following conditions: Code brain attack.    Critical care was time spent personally by me on the following activities:  Obtaining history from patient or surrogate, examination of patient, evaluation of patient's response to treatment, discussions with consultants, development of treatment plan with patient or surrogate, blood draw for specimens, ordering and performing treatments and interventions, ordering and review of laboratory studies, ordering and review of radiographic studies, pulse oximetry and re-evaluation of patient's condition    Care discussed with: admitting provider and accepting provider at another facility    Comments:      Critical care time of  33 minutes billed for activation of code brain attack with assessment of the patient with NIH stroke scale, consideration of tPA inclusion and exclusion criteria, consultation with telestroke neurology, consultation with the hospitalist for admission, consultation with the accepting provider at another facility and arrangement for transfer.  This time excludes time for billable procedures.      critical care time billed for by me is non concurrent with time billed for by NITA Castro MD  04/27/25 1481

## 2025-04-27 NOTE — PROGRESS NOTES
Attestation/Supervisory note for NITA Hogue      The patient is a 64-year-old male presenting to the emergency department for evaluation of lethargy.  The patient reportedly was somewhat awake sometime this morning but it is unclear what time.   It is unclear when he was last known well.  It is also unclear exactly what his status was this morning when he did wake up.  The information was obtained thirdhand as the patient's family reportedly called the roommate this morning and the roommate told them that he had woken up and complained of his eye bothering him and saying that he did not feel well and went back to bed.  The roommate reportedly then reportedly called 911 after she was not able to wake him up just prior to arrival.  The roommate was not available to give the information but the family reports that he does have a history of alcohol use and takes Brilinta.  He reportedly had a fall sometime last week and had some bleeding from his ear..  No other details were available.  The patient is not able to contribute to HPI or review of systems due to his altered mental status at this time.  He arrived to the emergency room unresponsive.  He had sonorous respirations upon arrival to the emergency room.  He did have palpable pulses.  No visible or palpable evidence of trauma.  His right eye was deviated.  Abdomen is benign.  Initial NIH stroke scale score of 28 was assigned to the patient.      IV Narcan was given with transient improvement in his status but then the patient returned to having sonorous respirations and being unresponsive.  He did not follow any commands with the transient regain of consciousness.      Code brain attack was activated.      The patient was intubated by NITA Hogue for protection of his airway without complication.      EKG with normal sinus rhythm at 84 bpm, normal axis, normal voltage, normal ST segment, and a slight inversion of the T waves in the inferior leads      Diagnostic  labs without significant abnormality      Initial troponin 12.  Repeat troponin 11      XR chest 1 view   Final Result   Support devices as above. No focal infiltrate.        MACRO:   None.        Signed by: Obdulia Michaud 4/27/2025 12:09 PM   Dictation workstation:   [id number]      CT brain attack angio head and neck W and WO IV contrast   Final Result   Findings suggesting potential thrombosis rather than hypoplasia of   the right P1 posterior cerebral artery with distal reconstitution via   a small right posterior communicating artery. Diminutive and thready   appearance of the right distal vertebral artery beyond the right V3   V4 junction. Please correlate clinically with presenting complaints   and consider MRI/MRA for instance for further evaluation if   clinically indicated.        MACRO:   Ismael Crowley discussed the significance and urgency of this critical   finding by telephone with  Dr. Castro on 4/27/2025 at 10:49 am.   (**-RCF-**) Findings:  See findings.        Signed by: Ismael Crowley 4/27/2025 10:59 AM   Dictation workstation:   [id number]      CT brain attack head wo IV contrast   Final Result   No CT evidence of acute large vessel territory infarct or   intracranial hemorrhage. Please correlate with the separately   reported CT angiogram and consider MRI as warranted.        MACRO:   Salazar Ko discussed the significance and urgency of this   critical finding by Epic secure chat with  ZAKIA ALEXANDRE on 4/27/2025   at 10:40 am.  (**-RCF-**) Findings:  See findings.        Signed by: Salazar Ko 4/27/2025 10:57 AM   Dictation workstation:   [id number]           The patient does not have any evidence of a STEMI on EKG or cardiac enzymes.  He is hemodynamically stable.  He is well-perfused on exam.  No evidence of sepsis.  Chest x-ray without acute process.  No evidence of pneumonia or pneumothorax.  No evidence of CHF.  No widening of the mediastinum.  His pulses are equal bilaterally.  CT head  showed no evidence of acute CVA, intracranial hemorrhage or mass effect.  CT angio head and neck shows potential thrombosis rather than hypoplasia of the P1 segment of the right posterior cerebral artery.  Telestroke neurology was consulted and evaluated the patient by telemedicine.  They did not feel that the patient warranted treatment with tPA/TNK given unclear last known well time at this time.  They did not feel that the patient needed transfer for higher level of care and recommended admission to this facility for further management of his symptoms and neurologic consultation as well as MRI.  The hospitalist was consulted but Dr. Andrade declined to admit the pt to this facility as we do not have neurology consultation available today.  Dr. Mandel, neurology. states that she can consult on the patient tomorrow but is not available today.  Dr. Disla accepted the pt to [facility].      Impression/diagnosis:  Altered mental status  Right eye deviation      Critical care time of  33 minutes billed for activation of code brain attack with assessment of the patient with NIH stroke scale, consideration of tPA inclusion and exclusion criteria, consultation with telestroke neurology, consultation with the hospitalist for admission, consultation with the accepting provider at another facility and arrangement for transfer.  This time excludes time for billable procedures.      critical care time billed for by me is non concurrent with time billed for by NITA Hogue      I personally saw the patient and made/approve the management plan and take responsibility for the patient management.      I independently interpreted the following study (S) EKG and diagnostic labs      I personally discussed the patient's management with the patient      I reviewed the results of the diagnostic labs and diagnostic imaging.  Formal radiology read was completed by the radiologist.      Savannah Castro MD

## 2025-04-27 NOTE — ED PROVIDER NOTES
Department of Emergency Medicine   ED  Provider Note  Admit Date/RoomTime: 4/27/2025 10:11 AM  [facility]        History of Present Illness:  Chief Complaint   Patient presents with    unresponsive     Pt was awake this morning per family. Pt went back to bed and family was not able to wake him up. Pt currently unresponsive and snoring.          Mateo Woodall is a 64 y.o. male history of coronary artery disease, hyperlipidemia, congestive heart failure, hypertension, atrial fibrillation on Bril[place], unresponsive.  Per the EMS report patient woke up this morning was talking normal felt tired went back to bed then was found to be unresponsive snoring respirations.  EMS was initiated.  No report of fall or injury of note patient had bleeding from his right ear last week.  No known alcohol use smokes marijuana     Review of Systems:   Pertinent positives and negatives are stated within HPI, all other systems reviewed and are negative.        --------------------------------------------- PAST HISTORY ---------------------------------------------  Past Medical History:  has a past medical history of Acute myocardial infarction (07/07/2023), Alcohol dependence (10/30/2023), Atrial fibrillation (Multi) (10/30/2023), Benign hypertensive heart disease without congestive heart failure (09/04/2023), Congestive heart failure (09/30/2023), Coronary arteriosclerosis in native artery (07/07/2023), Coronary artery disease, Essential hypertension (09/30/2023), High blood cholesterol, HTN (hypertension), Hyperlipidemia (09/04/2023), Impaired perfusion of peripheral tissue (10/30/2023), MI (myocardial infarction) (Multi) (2010), MI (myocardial infarction) (Multi), Postsurgical percutaneous transluminal coronary angioplasty status (09/04/2023), Stenosis of coronary artery stent (07/07/2023), Stricture of artery (CMS-HCC) (08/29/2023), Tobacco dependence (09/04/2023), and VT (ventricular tachycardia) (Multi) (07/07/2023).  Past  Surgical History:  has a past surgical history that includes Coronary artery bypass graft; orthopedic surgery; orthopedic surgery; Coronary angioplasty with stent; and Coronary angioplasty with stent.  Social History:  reports that he quit smoking about 18 months ago. His smoking use included cigarettes. He started smoking about 14 months ago. He has a 17.5 pack-year smoking history. He has been exposed to tobacco smoke. He has never used smokeless tobacco. He reports that he does not currently use alcohol. He reports that he does not use drugs.  Family History: family history includes Prostate cancer in his father; cabg x3 in his father; old age in his mother.. Unless otherwise noted, family history is non contributory  The patient’s home medications have been reviewed.  Allergies: Patient has no known allergies.        ---------------------------------------------------PHYSICAL EXAM--------------------------------------    GENERAL APPEARANCE: Unresponsive  VITAL SIGNS: As per the nurses' triage record.   HEENT: Normocephalic, atraumatic.  No raccoon eyes or lemus signs noted no epistaxis noted no bite to the tongue or lip.  Right eye deviation no pupillary response right eye.  Normal eye movement of the left pupils 3 brisk reactive to light Conjunctiva are pink. Negative scleral icterus. Mucous membranes are moist. Tongue in the midline. Pharynx was without erythema or exudates, uvula midline  NECK: Soft Nontender and supple, full gross ROM, no meningeal signs.  CHEST: Snoring respirations.  Oxygen saturation 95% nontender to palpation. Clear to auscultation bilaterally. No rales, rhonchi, or wheezing.   HEART: S1, S2. Regular rate and rhythm. No murmurs, gallops or rubs.  Strong and equal pulses in the extremities.   ABDOMEN: Soft, nontender, nondistended, positive bowel sounds, no palpable masses.  MUSCULCSKELETAL: Peripheral pulses intact  NEUROLOGICAL: Unresponsive flexion to pain lower extremities .  No  "response to pain of the upper extremities but patient does move purposely.  Lakisha, 5   IMMUNOLOGICAL: No lymphatic streaking noted   DERM: No petechiae, rashes, or ecchymoses.          ------------------------- NURSING NOTES AND VITALS REVIEWED ---------------------------  The nursing notes within the ED encounter and vital signs as below have been reviewed by myself  /82   Pulse 99   Temp 36.7 °C (98 °F)   Resp 17   Ht 1.702 m (5' 7\")   Wt 55.2 kg (121 lb 11.1 oz)   SpO2 100%   BMI 19.06 kg/m²     Oxygen Saturation Interpretation: 95% room air    The cardiac monitor revealed sinus rhythm with a heart rate in the 90 s as interpreted by me. The cardiac monitor was ordered secondary to the patient's heart rate and to monitor the patient for dysrhythmia.       The patient’s available past medical records and past encounters were reviewed.          -----------------------DIAGNOSTIC RESULTS------------------------  LABS:    Labs Reviewed   CBC WITH AUTO DIFFERENTIAL - Abnormal       Result Value    WBC 9.1      nRBC 0.0      RBC 4.13 (*)     Hemoglobin 13.5      Hematocrit 40.7 (*)     MCV 99      MCH 32.7      MCHC 33.2      RDW 12.9      Platelets 163      Neutrophils % 72.0      Immature Granulocytes %, Automated 0.3      Lymphocytes % 18.2      Monocytes % 6.1      Eosinophils % 2.6      Basophils % 0.8      Neutrophils Absolute 6.58      Immature Granulocytes Absolute, Automated 0.03      Lymphocytes Absolute 1.66      Monocytes Absolute 0.56      Eosinophils Absolute 0.24      Basophils Absolute 0.07     COMPREHENSIVE METABOLIC PANEL - Abnormal    Glucose 102 (*)     Sodium 139      Potassium 3.9      Chloride 106      Bicarbonate 26      Anion Gap 11      Urea Nitrogen 12      Creatinine 0.84      eGFR >90      Calcium 8.9      Albumin 3.9      Alkaline Phosphatase 51      Total Protein 6.7      AST 16      Bilirubin, Total 0.8      ALT 9 (*)    URINALYSIS WITH REFLEX CULTURE AND MICROSCOPIC - " Abnormal    Color, Urine Colorless (*)     Appearance, Urine Clear      Specific Gravity, Urine 1.019      pH, Urine 7.0      Protein, Urine NEGATIVE      Glucose, Urine Normal      Blood, Urine NEGATIVE      Ketones, Urine NEGATIVE      Bilirubin, Urine NEGATIVE      Urobilinogen, Urine Normal      Nitrite, Urine NEGATIVE      Leukocyte Esterase, Urine NEGATIVE     MAGNESIUM - Normal    Magnesium 1.99     SARS-COV-2, INFLUENZA A/B AND RSV PCR - Normal    Coronavirus 2019, PCR Not Detected      Flu A Result Not Detected      Flu B Result Not Detected      RSV PCR Not Detected      Narrative:     This assay is an FDA-cleared, in vitro diagnostic nucleic acid amplification test for the qualitative detection and differentiation of SARS CoV-2/ Influenza A/B/ RSV from nasopharyngeal specimens collected from individuals with signs and symptoms of respiratory tract infections, and has been validated for use at [facility]. Negative results do not preclude COVID-19/ Influenza A/B/ RSV infections and should not be used as the sole basis for diagnosis, treatment, or other management decisions. Testing for SARS CoV-2 is recommended only for patients who meet current clinical and/or epidemiological criteria defined by federal, state, or local public health directives.   PROTIME-INR - Normal    Protime 11.4      INR 1.1      Narrative:     INR Therapeutic Range: 2.0-3.5   APTT - Normal    aPTT 25.1     SERIAL TROPONIN-INITIAL - Normal    Troponin I, High Sensitivity 12      Narrative:     Less than 99th percentile of normal range cutoff-  Female and children under 18 years old <14 ng/L; Male <21 ng/L: Negative  Repeat testing should be performed if clinically indicated.     Female and children under 18 years old 14-50 ng/L; Male 21-50 ng/L:  Consistent with possible cardiac damage and possible increased clinical   risk. Serial measurements may help to assess extent of myocardial damage.     >50 ng/L:  Consistent with cardiac damage, increased clinical risk and  myocardial infarction. Serial measurements may help assess extent of   myocardial damage.      NOTE: Children less than 1 year old may have higher baseline troponin   levels and results should be interpreted in conjunction with the overall   clinical context.     NOTE: Troponin I testing is performed using a different   testing methodology at [facility] than at other   [facility]. Direct result comparisons should only   be made within the same method.   LACTATE - Normal    Lactate 1.2      Narrative:     Venipuncture immediately after or during the administration of Metamizole may lead to falsely low results. Testing should be performed immediately prior to Metamizole dosing.   ACUTE TOXICOLOGY PANEL, BLOOD - Normal    Acetaminophen <10.0      Salicylate  <3      Alcohol <10     DRUG SCREEN,URINE - Normal    Amphetamine Screen, Urine Presumptive Negative      Barbiturate Screen, Urine Presumptive Negative      Benzodiazepines Screen, Urine Presumptive Negative      Cannabinoid Screen, Urine Presumptive Negative      Cocaine Metabolite Screen, Urine Presumptive Negative      Fentanyl Screen, Urine Presumptive Negative      Opiate Screen, Urine Presumptive Negative      Oxycodone Screen, Urine Presumptive Negative      PCP Screen, Urine Presumptive Negative      Methadone Screen, Urine Presumptive Negative      Narrative:     Drug screen results are presumptive and should not be used to assess   compliance with prescribed medication. Contact the performing [facility] laboratory   to add-on definitive confirmatory testing if clinically indicated.    Toxicology screening results are reported qualitatively. The concentration must   be greater than or equal to the cutoff to be reported as positive. The concentration   at which the screening test can detect an individual drug or metabolite varies.   The absence of expected drug(s) and/or drug  metabolite(s) may indicate non-compliance,   inappropriate timing of specimen collection relative to drug administration, poor drug   absorption, diluted/adulterated urine, or limitations of testing. For medical purposes   only; not valid for forensic use.    Interpretive questions should be directed to the laboratory medical directors.   SERIAL TROPONIN, 1 HOUR - Normal    Troponin I, High Sensitivity 11      Narrative:     Less than 99th percentile of normal range cutoff-  Female and children under 18 years old <14 ng/L; Male <21 ng/L: Negative  Repeat testing should be performed if clinically indicated.     Female and children under 18 years old 14-50 ng/L; Male 21-50 ng/L:  Consistent with possible cardiac damage and possible increased clinical   risk. Serial measurements may help to assess extent of myocardial damage.     >50 ng/L: Consistent with cardiac damage, increased clinical risk and  myocardial infarction. Serial measurements may help assess extent of   myocardial damage.      NOTE: Children less than 1 year old may have higher baseline troponin   levels and results should be interpreted in conjunction with the overall   clinical context.     NOTE: Troponin I testing is performed using a different   testing methodology at [facility] than at other   [facility]. Direct result comparisons should only   be made within the same method.   TROPONIN SERIES- (INITIAL, 1 HR)    Narrative:     The following orders were created for panel order Troponin I Series, High Sensitivity (0, 1 HR).  Procedure                               Abnormality         Status                     ---------                               -----------         ------                     Troponin I, High Sensiti...[[id number]]  Normal              Final result               Troponin, High Sensitivi...[[id number]]  Normal              Final result                 Please view results for these tests on the individual orders.    URINALYSIS WITH REFLEX CULTURE AND MICROSCOPIC    Narrative:     The following orders were created for panel order Urinalysis with Reflex Culture and Microscopic.  Procedure                               Abnormality         Status                     ---------                               -----------         ------                     Urinalysis with Reflex C...[[id number]]  Abnormal            Final result               Extra Urine Gray Tube[[id number]]                                                         Please view results for these tests on the individual orders.   EXTRA URINE GRAY TUBE   BLOOD GAS ARTERIAL FULL PANEL   POCT GLUCOSE METER       As interpreted by me, the above displayed labs are abnormal. All other labs obtained during this visit were within normal range or not returned as of this dictation.      EKG Interpretation    EKG with normal sinus rhythm at 84 bpm, normal axis, normal voltage, normal ST segment, and a slight inversion of the T waves in the inferior leads   per attending note         XR chest 1 view   Final Result   Support devices as above. No focal infiltrate.        MACRO:   None.        Signed by: Obdulia Michaud 4/27/2025 12:09 PM   Dictation workstation:   [id number]      CT brain attack angio head and neck W and WO IV contrast   Final Result   Findings suggesting potential thrombosis rather than hypoplasia of   the right P1 posterior cerebral artery with distal reconstitution via   a small right posterior communicating artery. Diminutive and thready   appearance of the right distal vertebral artery beyond the right V3   V4 junction. Please correlate clinically with presenting complaints   and consider MRI/MRA for instance for further evaluation if   clinically indicated.        MACRO:   Ismael Crowley discussed the significance and urgency of this critical   finding by telephone with  Dr. Castro on 4/27/2025 at 10:49 am.   (**-RCF-**) Findings:  See findings.        Signed by: Ismael  Blitz 4/27/2025 10:59 AM   Dictation workstation:   [id number]      CT brain attack head wo IV contrast   Final Result   No CT evidence of acute large vessel territory infarct or   intracranial hemorrhage. Please correlate with the separately   reported CT angiogram and consider MRI as warranted.        MACRO:   Salazar Ko discussed the significance and urgency of this   critical finding by Epic secure chat with  ZAKIA BALDOMERO on 4/27/2025   at 10:40 am.  (**-RCF-**) Findings:  See findings.        Signed by: Salazar Ko 4/27/2025 10:57 AM   Dictation workstation:   [id number]              XR chest 1 view   Final Result   Support devices as above. No focal infiltrate.        MACRO:   None.        Signed by: Obdulia Michaud 4/27/2025 12:09 PM   Dictation workstation:   [id number]      CT brain attack angio head and neck W and WO IV contrast   Final Result   Findings suggesting potential thrombosis rather than hypoplasia of   the right P1 posterior cerebral artery with distal reconstitution via   a small right posterior communicating artery. Diminutive and thready   appearance of the right distal vertebral artery beyond the right V3   V4 junction. Please correlate clinically with presenting complaints   and consider MRI/MRA for instance for further evaluation if   clinically indicated.        MACRO:   Ismael Crowley discussed the significance and urgency of this critical   finding by telephone with  Dr. Castro on 4/27/2025 at 10:49 am.   (**-RCF-**) Findings:  See findings.        Signed by: Ismael Crowley 4/27/2025 10:59 AM   Dictation workstation:   [id number]      CT brain attack head wo IV contrast   Final Result   No CT evidence of acute large vessel territory infarct or   intracranial hemorrhage. Please correlate with the separately   reported CT angiogram and consider MRI as warranted.        MACRO:   Salazar Ko discussed the significance and urgency of this   critical finding by Epic secure chat with   ZAKIA ALEXANDRE on 4/27/2025   at 10:40 am.  (**-RCF-**) Findings:  See findings.        Signed by: Salazar Ko 4/27/2025 10:57 AM   Dictation workstation:   [id number]              ------------------------------ ED COURSE/MEDICAL DECISION MAKING----------------------  Medical Decision Making:   Exam: A medically appropriate exam performed, outlined above, given the known history and presentation.    History obtained from: EMS nursing notes patient's record/family      Social Determinants of Health considered during this visit: Lives at home with her roommate      PAST MEDICAL HISTORY/Chronic Conditions Affecting Care     has a past medical history of Acute myocardial infarction (07/07/2023), Alcohol dependence (10/30/2023), Atrial fibrillation (Multi) (10/30/2023), Benign hypertensive heart disease without congestive heart failure (09/04/2023), Congestive heart failure (09/30/2023), Coronary arteriosclerosis in native artery (07/07/2023), Coronary artery disease, Essential hypertension (09/30/2023), High blood cholesterol, HTN (hypertension), Hyperlipidemia (09/04/2023), Impaired perfusion of peripheral tissue (10/30/2023), MI (myocardial infarction) (Multi) (2010), MI (myocardial infarction) (Multi), Postsurgical percutaneous transluminal coronary angioplasty status (09/04/2023), Stenosis of coronary artery stent (07/07/2023), Stricture of artery (CMS-[facility]) (08/29/2023), Tobacco dependence (09/04/2023), and VT (ventricular tachycardia) (Multi) (07/07/2023).       CC/HPI Summary, Social Determinants of health, Records Reviewed, DDx, testing done/not done, ED Course, Reassessment, disposition considerations/shared decision making with patient, consults, disposition:   Patient presented unresponsive episode of right eye deviation no reactive pupillary response.  Seen immediately with attending physician.  Brain attack initiated  NIH 23 Glascow coma 5 patient responds to painful stimuli  Plan  EKG, CBC, CMP,  magnesium, COVID flu RSV, troponin, ventilator, ABG, etomidate, Narcan, Versed, succinylcholine, restraints, CT angio head neck, CT brain, chest x-ray, EKG, tox screen, drug screen, lactate, coag panel, glucose, urine    Medical Decision Making/Differential Diagnosis:  Differentials include but not limited to stroke versus TIA versus intracranial bleed versus intoxication versus drug ingestion versus encephalopathy metabolic source versus infectious process.  Versus electrolyte abnormality versus dehydration versus seizure patient was seen immediately upon arrival snoring respirations NIH of 23, [place], 5.  CT of the head showed No CT evidence of acute large vessel territory infarct or intracranial hemorrhage.  CT angio of the head neck showed Findings suggesting potential thrombosis rather than hypoplasia of the right P1 posterior cerebral artery with distal reconstitution via  a small right posterior communicating artery. Diminutive and thready appearance of the right distal vertebral artery beyond the right V3 V4 junction.  CT results reviewed with the stroke telemetry team.  Patient is not a candidate for thrombectomy or TNK onset of symptoms is unclear.  Please correlate clinically with presenting complaints and consider MRI/MRA for instance for further evaluation if  clinically indicated.  Family is not available at this time.  However family did arrive they are unsure when his last known well was however at about 930 per the family patient started complaining of right eye wandering.  Laid down and was unable to be awakened after being checked on.  EMS was initiated.  Patient was given 2 mg of Narcan with some response and now able to talk and open eyes but then goes back to story rest.  Repeat Narcan with little to no improvement.  Due to patient's decreased altered status [place] Coma Scale 5 changed to intubate patient for airway protection.  Please see procedure note.  Timeout performed.  Etomidate and  succinylcholine.  Intubated with a MAC 4 curved #8 tube preoxygenated before intubation.  Good color change equal breath sounds initially sedated patient with propofol difficulty getting him calmly sedated.  Was given Versed with improvement switch from propofol to Versed patient tolerating well at this time.  Chest x-ray confirmed upport devices as above. No focal infiltrate.  Drug screen negative Electrolytes unremarkable  Normal renal function LFTs unremarkable  Magnesium normal EKG per attending note no ST elevation or arrhythmia noted cardiac enzymes within normal limits.  Cardiac enzymes within normal limits Coag panel within normal limits Lactate 1.2 no elevation white blood cell count patient is not anemic urine is unremarkable   Discussed case with hospitalist team after evaluation by stroke neurology team.  [place] patient should be in a facility were neurology is available to do in-house evaluation.  Case was discussed with the ICU team at [facility] has agreed to accept the patient via [employer] chat  Urine unremarkable.  Based on patient's clinical presentation history and symptoms consistent with unresponsive  Altered mental status requiring admission to ICU secondary to intubation [place] Coma Scale 5.  Admitted to [facility] ICU level of care requiring consultation to stroke neurology, neurology, hospitalist and ICU team  Patient seen and evaluated with attending physician Dr. Castro   PROCEDURES  Unless otherwise noted below, none      CONSULTS:   IP CONSULT TO NEURO TELESTROKE  Consult hospitalist team Lupe   Consult critical care team Dr. Disla   Consult neurology Dr. Mandel       ED Course as of 04/27/25 1338   Sun Apr 27, 2025   1044 Intubation plan patient returned back from CT continues to have a Glascow coma of 5 had slight improvement of altered mental status briefly after 2 mg of Narcan repeated Narcan with no additional response. [TB]   1104 Patient is on propofol received a 50 mg  propofol bolus still agitated on the vent was given 5mg of Versed if no improvement will switch to Versed drip [TB]   1107 Patient seen and evaluated by stroke neurology team Dr. Felix patient is not a candidate for thrombolytic to me.  Not a candidate for TNK at this time.  Unknown onset exactly.  Unsure cause of patient's altered mental status [TB]   1110 Patient responded better to the Versed [TB]   1138 Family present at bedside.  Sister and daughter.  Patient lives with a roommate.  It is reported that he was complaining of something with his right eye wandering did not feel well and went to bed around 930 it is unclear what time he woke up this morning.  Family reported that the roommate stated she went back in to check on him and could not get him awake 911 was initiated family is trying to clarify.  No fall or injury reported.  Family states he does drink alcohol but they are not sure how much. [TB]   1139 Discussed case with hospitalist team.  Recommended transfer to ICU where neurology team is available in house.  We clarified with the neurology team. [TB]   1206 Patient accepted by Dr. Disla ICU intensivist at [facility] via epic chat  [TB]   1308 Patient continue on the Versed.  Seems to be experiencing some additional agitation 2 mg bolus ordered [TB]   1319 EMS present to transport patient to the  [facility] ICU updated the admitting team via epic chat [TB]   1329 Patient becoming agitated with movement and transfer propofol also added back to patient's sedation plan [TB]   1338 Blood gas pending upon transfer [TB]      ED Course User Index  [TB] Jil Hogue, APRN-CNP         Diagnoses as of 04/27/25 1338   Unresponsive   Altered mental status, unspecified altered mental status type   Respiratory failure, unspecified chronicity, unspecified whether with hypoxia or hypercapnia         This patient has remained hemodynamically stable during their ED course.      Critical Care: 31   Critical  Care    Performed by: THALIA Coats  Authorized by: Savannah Castro MD    Critical care provider statement:     Critical care time (minutes):  31    Critical care time was exclusive of:  Separately billable procedures and treating other patients and teaching time    Critical care was necessary to treat or prevent imminent or life-threatening deterioration of the following conditions:  Respiratory failure and CNS failure or compromise    Critical care was time spent personally by me on the following activities:  Blood draw for specimens, development of treatment plan with patient or surrogate, discussions with consultants, evaluation of patient's response to treatment, examination of patient, ordering and performing treatments and interventions, ordering and review of laboratory studies, ordering and review of radiographic studies, pulse oximetry, re-evaluation of patient's condition, review of old charts, obtaining history from patient or surrogate and interpretation of cardiac output measurements  Intubation    Performed by: THALIA Coats  Authorized by: Savannah Castro MD    Consent:     Consent obtained:  Emergent situation  Universal protocol:     Procedure explained and questions answered to patient or proxy's satisfaction: yes      Relevant documents present and verified: yes      Test results available: yes      Required blood products, implants, devices, and special equipment available: yes      Site/side marked: yes      Immediately prior to procedure, a time out was called: yes      Patient identity confirmed:  Verbally with patient and arm band  Pre-procedure details:     Indications: airway protection and respiratory failure      Patient status:  Unresponsive    Look externally: facial hair      Obstruction: none      Neck mobility: normal      Pharmacologic strategy: RSI      Induction agents:  Etomidate    Paralytics:  Succinylcholine  Procedure details:     Preoxygenation:  Bag  valve mask    CPR in progress: no      Number of attempts:  1  Successful intubation attempt details:     Intubation method:  Oral    Intubation technique: video assisted      Laryngoscope blade:  Hypercurved    Bougie used: no      Grade view: I      Tube size (mm):  8.0    Tube type:  Cuffed    Tube visualized through cords: yes    Placement assessment:     ETT at teeth/gumline (cm):  24    Tube secured with:  ETT sultana    Breath sounds:  Equal    Placement verification: chest rise, colorimetric ETCO2, CXR verification, direct visualization, equal breath sounds, numeric ETCO2, tube exhalation and waveform ETCO2      CXR findings:  Appropriate position  Post-procedure details:     Procedure completion:  Tolerated well, no immediate complications  Comments:      Dentures removed preintubation.  No cut to the lip.  Critical care time secondary to acute neurological failure respiratory failure requiring intubation close monitoring advanced testing consultation to stroke neurology team brain attackBehavioral Restraint / Seclusion Face to Face Assessment    Patient Name:         Mateo Woodall  YOB: 1960  Medical Record #:   [id number]      Documentation of restraint type placed:      Date Assessment was completed: 4/27/2025    Time patient was assessed: 1:38 PM     Description of behavior causing restraint/seclusion:  Patient pulling at medical devices    Type of intervention: Chemical    Patient's immediate situation:  Altered mental status    Alternatives Attempted: Alternatives attempted and have been ineffective.    Contraindications for Restraints: Reviewed contraindications for continued restraint use and agree to on-going need.    The medication administered is appropriate for the patient's condition based on imminent danger failing alternatives attempted: Yes    Patent's reaction to intervention: behaviors have lessened but are still present    Patient's medical condition: vital signs  stable, normal circulation and breathing, positioned safely based upon medical and psychological issues, and skin is protected    Patient's behavioral condition: Still attempting to pull at devices.  Will remain with soft restraints    Plan: Continue restraints      Counseling:  The emergency provider has spoken with the  and discussed today’s results, in addition to providing specific details for the plan of care and counseling regarding the diagnosis and prognosis.  Questions are answered at this time and they are agreeable with the plan.         --------------------------------- IMPRESSION AND DISPOSITION ---------------------------------    IMPRESSION  1. Unresponsive    2. Altered mental status, unspecified altered mental status type    3. Respiratory failure, unspecified chronicity, unspecified whether with hypoxia or hypercapnia        DISPOSITION  Disposition: Admit ICU level care transfer [facility]  Patient condition is critical        NOTE: This report was transcribed using voice recognition software. Every effort was made to ensure accuracy; however, inadvertent computerized transcription errors may be present      THALIA Coats  04/27/25 1338       THALIA Coats  04/27/25 1338

## 2025-04-27 NOTE — CARE PLAN
Problem: Pain - Adult  Goal: Verbalizes/displays adequate comfort level or baseline comfort level  Outcome: Progressing     Problem: Safety - Adult  Goal: Free from fall injury  Outcome: Progressing     Problem: Discharge Planning  Goal: Discharge to home or other facility with appropriate resources  Outcome: Progressing     Problem: Chronic Conditions and Co-morbidities  Goal: Patient's chronic conditions and co-morbidity symptoms are monitored and maintained or improved  Outcome: Progressing     Problem: Nutrition  Goal: Nutrient intake appropriate for maintaining nutritional needs  Outcome: Progressing     Problem: Fall/Injury  Goal: Not fall by end of shift  Outcome: Progressing  Goal: Be free from injury by end of the shift  Outcome: Progressing  Goal: Verbalize understanding of personal risk factors for fall in the hospital  Outcome: Progressing  Goal: Verbalize understanding of risk factor reduction measures to prevent injury from fall in the home  Outcome: Progressing  Goal: Use assistive devices by end of the shift  Outcome: Progressing  Goal: Pace activities to prevent fatigue by end of the shift  Outcome: Progressing     Problem: Pain  Goal: Takes deep breaths with improved pain control throughout the shift  Outcome: Progressing  Goal: Turns in bed with improved pain control throughout the shift  Outcome: Progressing  Goal: Walks with improved pain control throughout the shift  Outcome: Progressing  Goal: Performs ADL's with improved pain control throughout shift  Outcome: Progressing  Goal: Participates in PT with improved pain control throughout the shift  Outcome: Progressing  Goal: Free from opioid side effects throughout the shift  Outcome: Progressing  Goal: Free from acute confusion related to pain meds throughout the shift  Outcome: Progressing     Problem: Physical Restraint  Goal: I will require minimum level of restraint  Outcome: Progressing   The patient's goals for the shift include       The clinical goals for the shift include      Over the shift, the patient did not make progress toward the following goals. Barriers to progression include altered mental status. Recommendations to address these barriers include neuro consult.

## 2025-04-27 NOTE — H&P
History Of Present Illness  Mateo Woodall is a 64 y.o. male with h/o DLP, HTN, CAD, CHF, Afib who was brought in to the ED after being found unresponsive this am. No fall or trauma. Of note had bleeding from his R ear last week. In the ED CT head did not show evidence of acute bleed or mass effect. CT angio H&N showed potential thrombosis of the P1 segment of the R PCA. Tele-Stroke was consulted who did not feel TNK was warranted, as unclear last known well time. ICU at  was then called and accepted to the ICU. Of note was intubated in the ED for respiratory failure and airway protection. He remained hemodynamically stable until his arrival.   The patient is currently intubated and sedated and cannot provide history.   Past Medical History  Medical History[1]  Surgical History  Surgical History[2]   Social History  He reports that he quit smoking about 18 months ago. His smoking use included cigarettes. He started smoking about 14 months ago. He has a 17.5 pack-year smoking history. He has been exposed to tobacco smoke. He has never used smokeless tobacco. He reports that he does not currently use alcohol. He reports that he does not use drugs. Smokes Marijuana.   Family History  Family History[3]  Current Outpatient Medications   Medication Instructions    aspirin 81 mg EC tablet TAKE 1 TABLET BY MOUTH DAILY HOLD IF GIVEN IN LAST 24 HOURS OR IF HISTORY OF HYPERSENSITIVITY.    atorvastatin (LIPITOR) 80 mg, oral, Nightly    Brilinta 90 mg, oral, 2 times daily    ezetimibe (ZETIA) 10 mg, oral, Daily    losartan (COZAAR) 100 mg, oral, Daily    metoprolol succinate XL (TOPROL-XL) 200 mg, oral, Daily    metoprolol succinate XL (TOPROL-XL) 50 mg, oral, Daily    metoprolol succinate XL (TOPROL-XL) 50 mg, oral, Daily, Do not crush or chew.    nitroglycerin (Nitrostat) 0.4 mg SL tablet Take 1 tablet by mouth sublingual as needed for chest pain give up to 3 doses hold of sbp is less than 90. Notify physician.    traMADol  (ULTRAM) 50 mg, oral, Every 6 hours PRN    varenicline (Chantix BEATRIZ) 0.5 mg (11)- 1 mg (42) tablet USE AS DIRECTED ON PACKAGE INSTRUCTIONS, TRY TO QUIT SMOKING AFTER 1 WEEK    varenicline tartrate (CHANTIX) 1 mg, oral, 2 times daily, Take with full glass of water.   Allergies  Patient has no known allergies.  Review of Systems   Unable to perform ROS: Intubated   Scheduled medications  aspirin, 81 mg, orogastric tube, Daily  atorvastatin, 80 mg, orogastric tube, Nightly  [START ON 4/28/2025] ezetimibe, 10 mg, orogastric tube, Daily  fentaNYL, 25 mcg, intravenous, Once  heparin (porcine), 5,000 Units, subcutaneous, q8h  insulin lispro, 0-5 Units, subcutaneous, q4h  [Held by provider] losartan, 100 mg, oral, Daily  [Held by provider] metoprolol succinate XL, 200 mg, oral, Daily  [Held by provider] metoprolol succinate XL, 50 mg, oral, Daily  oxygen, , inhalation, Continuous - Inhalation  [START ON 4/28/2025] pantoprazole, 40 mg, oral, Daily before breakfast   Or  [START ON 4/28/2025] pantoprazole, 40 mg, intravenous, Daily before breakfast  psyllium, 1 packet, oral, Daily  [Held by provider] ticagrelor, 90 mg, oral, BID    Continuous medications  D5 % and 0.9 % sodium chloride, 75 mL/hr, Last Rate: 75 mL/hr (04/27/25 1447)  fentaNYL, 0-300 mcg/hr, Last Rate: 25 mcg/hr (04/27/25 1526)  propofol, 5-50 mcg/kg/min, Last Rate: 15 mcg/kg/min (04/27/25 1528)    PRN medications  PRN medications: dextrose, dextrose, fentaNYL, glucagon, glucagon   Physical Exam  Constitutional:       General: He is not in acute distress.     Appearance: He is normal weight. He is ill-appearing. He is not toxic-appearing.   HENT:      Head: Normocephalic and atraumatic.      Mouth/Throat:      Comments: ET and G tube in place.   Eyes:      General: No scleral icterus.     Comments: Pinpoint pupils bilaterally.    Cardiovascular:      Rate and Rhythm: Normal rate and regular rhythm.      Heart sounds: No murmur heard.     No friction rub. No  "gallop.   Pulmonary:      Effort: Pulmonary effort is normal. No respiratory distress.      Breath sounds: No wheezing or rales.      Comments: Clear lung fields bilaterally with fair air entry on MV.   Abdominal:      General: Bowel sounds are normal. There is no distension.      Palpations: Abdomen is soft.      Tenderness: There is no abdominal tenderness.   Musculoskeletal:      Cervical back: Neck supple. No rigidity or tenderness.      Right lower leg: No edema.      Left lower leg: No edema.   Lymphadenopathy:      Cervical: No cervical adenopathy.   Skin:     General: Skin is warm and dry.      Coloration: Skin is not jaundiced.   Neurological:      Comments: Cannot fully assess. Currently unresponsive. But spontaneously moves all extremities.    Psychiatric:      Comments: Cannot fully assess. Currently unresponsive, but able to move extremities.    Last Recorded Vitals  Blood pressure 151/80, pulse 98, temperature 36.6 °C (97.9 °F), temperature source Oral, resp. rate (!) 28, height 1.702 m (5' 7\"), weight 60 kg (132 lb 4.4 oz), SpO2 99%.  Relevant Results  Results for orders placed or performed during the hospital encounter of 04/27/25 (from the past 24 hours)   CBC and Auto Differential   Result Value Ref Range    WBC 9.1 4.4 - 11.3 x10*3/uL    nRBC 0.0 0.0 - 0.0 /100 WBCs    RBC 4.13 (L) 4.50 - 5.90 x10*6/uL    Hemoglobin 13.5 13.5 - 17.5 g/dL    Hematocrit 40.7 (L) 41.0 - 52.0 %    MCV 99 80 - 100 fL    MCH 32.7 26.0 - 34.0 pg    MCHC 33.2 32.0 - 36.0 g/dL    RDW 12.9 11.5 - 14.5 %    Platelets 163 150 - 450 x10*3/uL    Neutrophils % 72.0 40.0 - 80.0 %    Immature Granulocytes %, Automated 0.3 0.0 - 0.9 %    Lymphocytes % 18.2 13.0 - 44.0 %    Monocytes % 6.1 2.0 - 10.0 %    Eosinophils % 2.6 0.0 - 6.0 %    Basophils % 0.8 0.0 - 2.0 %    Neutrophils Absolute 6.58 1.20 - 7.70 x10*3/uL    Immature Granulocytes Absolute, Automated 0.03 0.00 - 0.70 x10*3/uL    Lymphocytes Absolute 1.66 1.20 - 4.80 x10*3/uL "    Monocytes Absolute 0.56 0.10 - 1.00 x10*3/uL    Eosinophils Absolute 0.24 0.00 - 0.70 x10*3/uL    Basophils Absolute 0.07 0.00 - 0.10 x10*3/uL   Comprehensive Metabolic Panel   Result Value Ref Range    Glucose 102 (H) 74 - 99 mg/dL    Sodium 139 136 - 145 mmol/L    Potassium 3.9 3.5 - 5.3 mmol/L    Chloride 106 98 - 107 mmol/L    Bicarbonate 26 21 - 32 mmol/L    Anion Gap 11 10 - 20 mmol/L    Urea Nitrogen 12 6 - 23 mg/dL    Creatinine 0.84 0.50 - 1.30 mg/dL    eGFR >90 >60 mL/min/1.73m*2    Calcium 8.9 8.6 - 10.3 mg/dL    Albumin 3.9 3.4 - 5.0 g/dL    Alkaline Phosphatase 51 33 - 136 U/L    Total Protein 6.7 6.4 - 8.2 g/dL    AST 16 9 - 39 U/L    Bilirubin, Total 0.8 0.0 - 1.2 mg/dL    ALT 9 (L) 10 - 52 U/L   Magnesium   Result Value Ref Range    Magnesium 1.99 1.60 - 2.40 mg/dL   Protime-INR   Result Value Ref Range    Protime 11.4 9.3 - 12.7 seconds    INR 1.1 0.9 - 1.2   APTT   Result Value Ref Range    aPTT 25.1 22.0 - 32.5 seconds   Troponin I, High Sensitivity, Initial   Result Value Ref Range    Troponin I, High Sensitivity 12 0 - 20 ng/L   Lactate   Result Value Ref Range    Lactate 1.2 0.4 - 2.0 mmol/L   Acute Toxicology Panel, Blood   Result Value Ref Range    Acetaminophen <10.0 10.0 - 30.0 ug/mL    Salicylate  <3 4 - 20 mg/dL    Alcohol <10 <=10 mg/dL   Drug Screen, Urine   Result Value Ref Range    Amphetamine Screen, Urine Presumptive Negative Presumptive Negative    Barbiturate Screen, Urine Presumptive Negative Presumptive Negative    Benzodiazepines Screen, Urine Presumptive Negative Presumptive Negative    Cannabinoid Screen, Urine Presumptive Negative Presumptive Negative    Cocaine Metabolite Screen, Urine Presumptive Negative Presumptive Negative    Fentanyl Screen, Urine Presumptive Negative Presumptive Negative    Opiate Screen, Urine Presumptive Negative Presumptive Negative    Oxycodone Screen, Urine Presumptive Negative Presumptive Negative    PCP Screen, Urine Presumptive Negative  Presumptive Negative    Methadone Screen, Urine Presumptive Negative Presumptive Negative   Urinalysis with Reflex Culture and Microscopic   Result Value Ref Range    Color, Urine Colorless (N) Light-Yellow, Yellow, Dark-Yellow    Appearance, Urine Clear Clear    Specific Gravity, Urine 1.019 1.005 - 1.035    pH, Urine 7.0 5.0, 5.5, 6.0, 6.5, 7.0, 7.5, 8.0    Protein, Urine NEGATIVE NEGATIVE, 10 (TRACE), 20 (TRACE) mg/dL    Glucose, Urine Normal Normal mg/dL    Blood, Urine NEGATIVE NEGATIVE mg/dL    Ketones, Urine NEGATIVE NEGATIVE mg/dL    Bilirubin, Urine NEGATIVE NEGATIVE mg/dL    Urobilinogen, Urine Normal Normal mg/dL    Nitrite, Urine NEGATIVE NEGATIVE    Leukocyte Esterase, Urine NEGATIVE NEGATIVE   Sars-CoV-2, Influenza A/B and RSV PCR   Result Value Ref Range    Coronavirus 2019, PCR Not Detected Not Detected    Flu A Result Not Detected Not Detected    Flu B Result Not Detected Not Detected    RSV PCR Not Detected Not Detected   Troponin, High Sensitivity, 1 Hour   Result Value Ref Range    Troponin I, High Sensitivity 11 0 - 20 ng/L   XR chest 1 view  Result Date: 4/27/2025  Interpreted By:  Obdulia Michaud, STUDY: XR CHEST 1 VIEW;  4/27/2025 11:30 am   INDICATION: Signs/Symptoms:Unresponsive.     COMPARISON: 10/10/2023   ACCESSION NUMBER(S): [id number]   ORDERING CLINICIAN: ZAKIA ALEXANDRE   FINDINGS: Two views were obtained. Artifact related to overlying monitoring leads. ET tube in place with tip approximately 5.9 cm above the anayeli. NG tube in place with tip overlying the left upper quadrant in the region of the proximal stomach. No focal infiltrate, pleural effusion or pneumothorax identified. Right lower chest presumed nipple shadow in the area of nipple marker on the previous exam. The cardiac silhouette is within normal limits for size. Dense presumed surgical material in the right upper quadrant.       Support devices as above. No focal infiltrate.   MACRO: None.   Signed by: Obdulia Michaud  4/27/2025 12:09 PM Dictation workstation:   [id number]    CT brain attack angio head and neck W and WO IV contrast  Result Date: 4/27/2025  Interpreted By:  Ismael Crowley, STUDY: CT BRAIN ATTACK ANGIO HEAD AND NECK W AND WO IV CONTRAST;  4/27/2025 10:34 am   INDICATION: Signs/Symptoms:Unresponsive.     COMPARISON: Noncontrast head CT 04/27/2025 10:17 a.m..   ACCESSION NUMBER(S): [id number]   ORDERING CLINICIAN: ZAKIA ALEXANDRE   TECHNIQUE: 75 ML of Omnipaque 350 was administered intravenously and axial images of the head and neck were acquired.  Coronal, sagittal, and 3-D reconstructions were provided for review.   FINDINGS: Findings CT angiography neck: Origins of the great vessels are unremarkable. Common carotid arteries are patent. Minimal to moderate left-greater-than-right atherosclerotic calcification of the carotid bifurcations without evidence of measurable stenosis by NASCET criteria. Left vertebral artery is dominant in the region of the neck and is patent. Right vertebral artery is diminutive in caliber but appears to be patent.   Findings CT angiography head: Distal internal carotid arteries are patent and branch appropriately into the anterior and middle cerebral arteries without evidence of hemodynamically significant stenosis or other abnormality identified. The distal left vertebral artery is patent. The distal right vertebral artery demonstrates subtle change in caliber at the right V3 V4 junction and is somewhat ready in opacification with patent right PICA. The basilar artery appears to be patent. The proximal left posterior cerebral artery is patent. There may be short segment occlusion of the right P1 posterior cerebral artery for instance on image 116 series 6 with distal reconstitution via a small right posterior communicating artery given relative calibers of these vessels. Posterior communicating artery not clearly visualized on the left side.       Findings suggesting potential thrombosis  rather than hypoplasia of the right P1 posterior cerebral artery with distal reconstitution via a small right posterior communicating artery. Diminutive and thready appearance of the right distal vertebral artery beyond the right V3 V4 junction. Please correlate clinically with presenting complaints and consider MRI/MRA for instance for further evaluation if clinically indicated.   MACRO: Ismael Crowley discussed the significance and urgency of this critical finding by telephone with  Dr. Castro on 4/27/2025 at 10:49 am. (**-RCF-**) Findings:  See findings.   Signed by: Ismael Crowley 4/27/2025 10:59 AM Dictation workstation:   [id number]    CT brain attack head wo IV contrast  Result Date: 4/27/2025  Interpreted By:  Salazar Ko, STUDY: CT BRAIN ATTACK HEAD WO IV CONTRAST;  4/27/2025 10:30 am   INDICATION: Signs/Symptoms:Stroke Evaluation.   COMPARISON: None.   ACCESSION NUMBER(S): [id number]   ORDERING CLINICIAN: ZAKIA ALEXANDRE   TECHNIQUE: Noncontrast axial CT scan of head was performed.   FINDINGS: Parenchyma: No evidence of acute large vessel territory infarct. No acute intracranial hemorrhage. No mass effect or midline shift.Scattered periventricular white matter hypodensities, likely chronic microvascular ischemic change.   CSF Spaces: The ventricles, sulci and basal cisterns are within normal limits for age.   Extra-Axial Fluid: None.   Calvarium: No significant abnormality.   Paranasal sinuses: Clear.   Mastoids: Clear.   Orbits: No significant abnormality.   Soft tissues: No significant abnormality.       No CT evidence of acute large vessel territory infarct or intracranial hemorrhage. Please correlate with the separately reported CT angiogram and consider MRI as warranted.   MACRO: Salazar Ko discussed the significance and urgency of this critical finding by Epic secure chat with  ZAKIA ALEXANDRE on 4/27/2025 at 10:40 am.  (**-RCF-**) Findings:  See findings.   Signed by: Salazar Ko 4/27/2025 10:57 AM  Dictation workstation:   [id number]    Assessment & Plan  AMS (altered mental status)      64 YOM with h/o DLP, HTN, CAD, CHF, Afib who was brought in to the ED after being found unresponsive this am. No fall or trauma. Of note had bleeding from his R ear last week. In the ED CT head did not show evidence of acute bleed or mass effect. CT angio H&N showed potential thrombosis of the P1 segment of the R PCA. Tele-Stroke was consulted who did not feel TNK was warranted, as unclear last known well time. ICU at  was then called and accepted to the ICU. Of note was intubated in the ED for respiratory failure and airway protection. He remained hemodynamically stable until his arrival.     Neuro: presented with AMS/unresponsiveness. Currently still minimally responsive, but spontaneously moves. CT head no acute bleed/CVA. CT angio H&N ? Thrombosis of P1 segment of the R PCA. Evaluated by tele neuro-stroke, not a candidate for TNK. Tox screen negative. Intubated for airway protection       Continue sedation with Propofol, analgesia with fentanyl drip       DC versed drip       Daily SAT        Neurology consulted, awaiting their input       Frequent neuro check       MRI of the brain vs repeat CT in 24 hours per        Secondary prevention, resumed ASA, will confirm with neurology/cardiology before re-starting Brilinta.        Holding home Tramadol    CV: h/o HFrEF 35% (global hypokinesia), HTN, DLP, CAD, Afib. Currently no acute issues. Currently in NSR       Continue home ASA, Atorvastatin and Zetia       Hold home Losartan and Metoprolol, can resume on 4/28 if remains hemodynamically stable       Cardiology consult regarding Brilinta (patient cannot afford, might need substitute)       Might benefit from repeat echo    Respiratory: intubated for airway protection but also possible respiratory failure (per ED note). No ABG in chart. Per chart review, history of Marijuana use. Also on Chantix.        Continue current  vent setting: PRVC 400/15/5, will adjust FiO2 as needed       ABG now       Daily SBT       Might need nicotine patch.          : no acute issues. CMP unremarkable.        Will start on maintenance IVF while NPO       Is/Os       Daily RFP       Treat electrolyte abnormalities as indicated.     GI: no acute issues       NPO       Dietitian consult       PPI for GI prophylaxis       Bowel regiment while receiving fentanyl drip    Endo: no acute issues       POCT every 4 hours       SSI #1       Hypoglycemia protocol    Hem/Onc: no acute issues       Continue to monitor for S&S infection.     ID: no findings suggestive of infection. However cannot rule out meningitis/encephalitis given AMS.        Continue to monitor for S&S infection.       Might need LP based on neurology recommendations.     DVT prophylaxis: subcutaneous heparin.     This was a critical care visit for encephalopathy and respiratory failure. Total time 60 min.     Christi Disla MD         [1]   Past Medical History:  Diagnosis Date    Acute myocardial infarction 07/07/2023    Alcohol dependence 10/30/2023    Atrial fibrillation (Multi) 10/30/2023    Benign hypertensive heart disease without congestive heart failure 09/04/2023    Congestive heart failure 09/30/2023    Coronary arteriosclerosis in native artery 07/07/2023    Coronary artery disease     Essential hypertension 09/30/2023    High blood cholesterol     HTN (hypertension)     Hyperlipidemia 09/04/2023    Impaired perfusion of peripheral tissue 10/30/2023    MI (myocardial infarction) (Multi) 2010    stents placed by Dr. Davila    MI (myocardial infarction) (Multi)     stents x2 by Dr. Salazar    Postsurgical percutaneous transluminal coronary angioplasty status 09/04/2023    Stenosis of coronary artery stent 07/07/2023    Stricture of artery (CMS-HCC) 08/29/2023    Tobacco dependence 09/04/2023    VT (ventricular tachycardia) (Multi) 07/07/2023   [2]   Past Surgical History:  Procedure  Laterality Date    CORONARY ANGIOPLASTY WITH STENT PLACEMENT      Dr Davila - 2 stents    CORONARY ANGIOPLASTY WITH STENT PLACEMENT      Dr Salazar - 2 stents    CORONARY ARTERY BYPASS GRAFT      [facility] cabg x2    ORTHOPEDIC SURGERY      right ankle repair    ORTHOPEDIC SURGERY      right arm repair   [3]   Family History  Problem Relation Name Age of Onset    Other (old age) Mother      Other (cabg x3) Father      Prostate cancer Father

## 2025-04-28 ENCOUNTER — APPOINTMENT (OUTPATIENT)
Dept: RADIOLOGY | Facility: HOSPITAL | Age: 65
End: 2025-04-28

## 2025-04-28 ENCOUNTER — APPOINTMENT (OUTPATIENT)
Dept: NEUROLOGY | Facility: HOSPITAL | Age: 65
End: 2025-04-28

## 2025-04-28 LAB
25(OH)D3 SERPL-MCNC: 37 NG/ML (ref 30–100)
ALBUMIN SERPL BCP-MCNC: 3.1 G/DL (ref 3.4–5)
ALP SERPL-CCNC: 43 U/L (ref 33–136)
ALT SERPL W P-5'-P-CCNC: 8 U/L (ref 10–52)
ANION GAP BLDA CALCULATED.4IONS-SCNC: 1 MMO/L (ref 10–25)
ANION GAP BLDA CALCULATED.4IONS-SCNC: 4 MMO/L (ref 10–25)
ANION GAP SERPL CALCULATED.3IONS-SCNC: 9 MMOL/L (ref 10–20)
APPARATUS: ABNORMAL
ARTERIAL PATENCY WRIST A: ABNORMAL
ARTERIAL PATENCY WRIST A: POSITIVE
AST SERPL W P-5'-P-CCNC: 14 U/L (ref 9–39)
BASE EXCESS BLDA CALC-SCNC: 2.5 MMOL/L (ref -2–3)
BASE EXCESS BLDA CALC-SCNC: 2.5 MMOL/L (ref -2–3)
BASE EXCESS BLDV CALC-SCNC: 0.3 MMOL/L (ref -2–3)
BASOPHILS # BLD AUTO: 0.04 X10*3/UL (ref 0–0.1)
BASOPHILS NFR BLD AUTO: 0.4 %
BILIRUB SERPL-MCNC: 0.3 MG/DL (ref 0–1.2)
BODY TEMPERATURE: 37 DEGREES CELSIUS
BODY TEMPERATURE: 37 DEGREES CELSIUS
BODY TEMPERATURE: ABNORMAL
BUN SERPL-MCNC: 10 MG/DL (ref 6–23)
CA-I BLDA-SCNC: 1.19 MMOL/L (ref 1.1–1.33)
CA-I BLDA-SCNC: 1.23 MMOL/L (ref 1.1–1.33)
CALCIUM SERPL-MCNC: 8.2 MG/DL (ref 8.6–10.3)
CHLORIDE BLDA-SCNC: 109 MMOL/L (ref 98–107)
CHLORIDE BLDA-SCNC: 111 MMOL/L (ref 98–107)
CHLORIDE SERPL-SCNC: 111 MMOL/L (ref 98–107)
CO2 SERPL-SCNC: 27 MMOL/L (ref 21–32)
CREAT SERPL-MCNC: 0.92 MG/DL (ref 0.5–1.3)
EGFRCR SERPLBLD CKD-EPI 2021: >90 ML/MIN/1.73M*2
EOSINOPHIL # BLD AUTO: 0.26 X10*3/UL (ref 0–0.7)
EOSINOPHIL NFR BLD AUTO: 2.6 %
ERYTHROCYTE [DISTWIDTH] IN BLOOD BY AUTOMATED COUNT: 13.2 % (ref 11.5–14.5)
GLUCOSE BLD MANUAL STRIP-MCNC: 109 MG/DL (ref 74–99)
GLUCOSE BLD MANUAL STRIP-MCNC: 115 MG/DL (ref 74–99)
GLUCOSE BLD MANUAL STRIP-MCNC: 116 MG/DL (ref 74–99)
GLUCOSE BLD MANUAL STRIP-MCNC: 131 MG/DL (ref 74–99)
GLUCOSE BLD MANUAL STRIP-MCNC: 207 MG/DL (ref 74–99)
GLUCOSE BLDA-MCNC: 105 MG/DL (ref 74–99)
GLUCOSE BLDA-MCNC: 111 MG/DL (ref 74–99)
GLUCOSE SERPL-MCNC: 112 MG/DL (ref 74–99)
HCO3 BLDA-SCNC: 27.9 MMOL/L (ref 22–26)
HCO3 BLDA-SCNC: 28.8 MMOL/L (ref 22–26)
HCO3 BLDV-SCNC: 26 MMOL/L (ref 22–26)
HCT VFR BLD AUTO: 35.5 % (ref 41–52)
HCT VFR BLD EST: 37 % (ref 41–52)
HCT VFR BLD EST: 43 % (ref 41–52)
HGB BLD-MCNC: 11.9 G/DL (ref 13.5–17.5)
HGB BLDA-MCNC: 12.2 G/DL (ref 13.5–17.5)
HGB BLDA-MCNC: 14.4 G/DL (ref 13.5–17.5)
IMM GRANULOCYTES # BLD AUTO: 0.04 X10*3/UL (ref 0–0.7)
IMM GRANULOCYTES NFR BLD AUTO: 0.4 % (ref 0–0.9)
INHALED O2 CONCENTRATION: 40 %
LACTATE BLDA-SCNC: 0.5 MMOL/L (ref 0.4–2)
LACTATE BLDA-SCNC: 1.1 MMOL/L (ref 0.4–2)
LYMPHOCYTES # BLD AUTO: 1.66 X10*3/UL (ref 1.2–4.8)
LYMPHOCYTES NFR BLD AUTO: 16.5 %
MAGNESIUM SERPL-MCNC: 1.84 MG/DL (ref 1.6–2.4)
MCH RBC QN AUTO: 33.8 PG (ref 26–34)
MCHC RBC AUTO-ENTMCNC: 33.5 G/DL (ref 32–36)
MCV RBC AUTO: 101 FL (ref 80–100)
MONOCYTES # BLD AUTO: 0.67 X10*3/UL (ref 0.1–1)
MONOCYTES NFR BLD AUTO: 6.7 %
NEUTROPHILS # BLD AUTO: 7.37 X10*3/UL (ref 1.2–7.7)
NEUTROPHILS NFR BLD AUTO: 73.4 %
NRBC BLD-RTO: 0 /100 WBCS (ref 0–0)
OXYHGB MFR BLDA: 95.7 % (ref 94–98)
OXYHGB MFR BLDA: 97.1 % (ref 94–98)
OXYHGB MFR BLDV: 93.3 % (ref 45–75)
PCO2 BLDA: 45 MM HG (ref 38–42)
PCO2 BLDA: 51 MM HG (ref 38–42)
PCO2 BLDV: 45 MM HG (ref 41–51)
PEEP CMH2O: 5 CM H2O
PEEP CMH2O: 5 CM H2O
PH BLDA: 7.36 PH (ref 7.38–7.42)
PH BLDA: 7.4 PH (ref 7.38–7.42)
PH BLDV: 7.37 PH (ref 7.33–7.43)
PHOSPHATE SERPL-MCNC: 3.9 MG/DL (ref 2.5–4.9)
PLATELET # BLD AUTO: 137 X10*3/UL (ref 150–450)
PO2 BLDA: 127 MM HG (ref 85–95)
PO2 BLDA: 165 MM HG (ref 85–95)
PO2 BLDV: 77 MM HG (ref 35–45)
POTASSIUM BLDA-SCNC: 3.5 MMOL/L (ref 3.5–5.3)
POTASSIUM BLDA-SCNC: 4.1 MMOL/L (ref 3.5–5.3)
POTASSIUM SERPL-SCNC: 3.5 MMOL/L (ref 3.5–5.3)
PROT SERPL-MCNC: 5.8 G/DL (ref 6.4–8.2)
RBC # BLD AUTO: 3.52 X10*6/UL (ref 4.5–5.9)
SAO2 % BLDA: 100 % (ref 94–100)
SAO2 % BLDA: 100 % (ref 94–100)
SAO2 % BLDV: 96 % (ref 45–75)
SODIUM BLDA-SCNC: 137 MMOL/L (ref 136–145)
SODIUM BLDA-SCNC: 137 MMOL/L (ref 136–145)
SODIUM SERPL-SCNC: 143 MMOL/L (ref 136–145)
SPECIMEN DRAWN FROM PATIENT: ABNORMAL
SPECIMEN DRAWN FROM PATIENT: ABNORMAL
TIDAL VOLUME: 400 ML
TIDAL VOLUME: 400 ML
VENTILATOR MODE: ABNORMAL
VENTILATOR MODE: ABNORMAL
VENTILATOR RATE: 15 BPM
VENTILATOR RATE: 15 BPM
VIT B12 SERPL-MCNC: 180 PG/ML (ref 211–911)
WBC # BLD AUTO: 10 X10*3/UL (ref 4.4–11.3)

## 2025-04-28 PROCEDURE — 2500000005 HC RX 250 GENERAL PHARMACY W/O HCPCS: Performed by: INTERNAL MEDICINE

## 2025-04-28 PROCEDURE — 83735 ASSAY OF MAGNESIUM: CPT

## 2025-04-28 PROCEDURE — 2500000002 HC RX 250 W HCPCS SELF ADMINISTERED DRUGS (ALT 637 FOR MEDICARE OP, ALT 636 FOR OP/ED): Performed by: INTERNAL MEDICINE

## 2025-04-28 PROCEDURE — 72141 MRI NECK SPINE W/O DYE: CPT | Performed by: RADIOLOGY

## 2025-04-28 PROCEDURE — 70551 MRI BRAIN STEM W/O DYE: CPT | Performed by: RADIOLOGY

## 2025-04-28 PROCEDURE — 2500000001 HC RX 250 WO HCPCS SELF ADMINISTERED DRUGS (ALT 637 FOR MEDICARE OP)

## 2025-04-28 PROCEDURE — 84132 ASSAY OF SERUM POTASSIUM: CPT

## 2025-04-28 PROCEDURE — 94003 VENT MGMT INPAT SUBQ DAY: CPT

## 2025-04-28 PROCEDURE — 82810 BLOOD GASES O2 SAT ONLY: CPT | Performed by: STUDENT IN AN ORGANIZED HEALTH CARE EDUCATION/TRAINING PROGRAM

## 2025-04-28 PROCEDURE — 99291 CRITICAL CARE FIRST HOUR: CPT | Performed by: STUDENT IN AN ORGANIZED HEALTH CARE EDUCATION/TRAINING PROGRAM

## 2025-04-28 PROCEDURE — 2500000001 HC RX 250 WO HCPCS SELF ADMINISTERED DRUGS (ALT 637 FOR MEDICARE OP): Performed by: INTERNAL MEDICINE

## 2025-04-28 PROCEDURE — 82805 BLOOD GASES W/O2 SATURATION: CPT | Performed by: STUDENT IN AN ORGANIZED HEALTH CARE EDUCATION/TRAINING PROGRAM

## 2025-04-28 PROCEDURE — 99221 1ST HOSP IP/OBS SF/LOW 40: CPT | Performed by: INTERNAL MEDICINE

## 2025-04-28 PROCEDURE — 2500000002 HC RX 250 W HCPCS SELF ADMINISTERED DRUGS (ALT 637 FOR MEDICARE OP, ALT 636 FOR OP/ED): Performed by: STUDENT IN AN ORGANIZED HEALTH CARE EDUCATION/TRAINING PROGRAM

## 2025-04-28 PROCEDURE — 84100 ASSAY OF PHOSPHORUS: CPT

## 2025-04-28 PROCEDURE — 72141 MRI NECK SPINE W/O DYE: CPT

## 2025-04-28 PROCEDURE — 2500000005 HC RX 250 GENERAL PHARMACY W/O HCPCS: Performed by: STUDENT IN AN ORGANIZED HEALTH CARE EDUCATION/TRAINING PROGRAM

## 2025-04-28 PROCEDURE — 36600 WITHDRAWAL OF ARTERIAL BLOOD: CPT

## 2025-04-28 PROCEDURE — 36415 COLL VENOUS BLD VENIPUNCTURE: CPT | Performed by: STUDENT IN AN ORGANIZED HEALTH CARE EDUCATION/TRAINING PROGRAM

## 2025-04-28 PROCEDURE — 2500000001 HC RX 250 WO HCPCS SELF ADMINISTERED DRUGS (ALT 637 FOR MEDICARE OP): Performed by: PSYCHIATRY & NEUROLOGY

## 2025-04-28 PROCEDURE — 2500000004 HC RX 250 GENERAL PHARMACY W/ HCPCS (ALT 636 FOR OP/ED): Performed by: INTERNAL MEDICINE

## 2025-04-28 PROCEDURE — 36415 COLL VENOUS BLD VENIPUNCTURE: CPT

## 2025-04-28 PROCEDURE — 2020000001 HC ICU ROOM DAILY

## 2025-04-28 PROCEDURE — 82947 ASSAY GLUCOSE BLOOD QUANT: CPT

## 2025-04-28 PROCEDURE — 85025 COMPLETE CBC W/AUTO DIFF WBC: CPT

## 2025-04-28 PROCEDURE — 2500000004 HC RX 250 GENERAL PHARMACY W/ HCPCS (ALT 636 FOR OP/ED): Mod: JZ

## 2025-04-28 PROCEDURE — 70551 MRI BRAIN STEM W/O DYE: CPT

## 2025-04-28 RX ORDER — PNV NO.95/FERROUS FUM/FOLIC AC 28MG-0.8MG
100 TABLET ORAL DAILY
Status: DISCONTINUED | OUTPATIENT
Start: 2025-04-28 | End: 2025-05-01 | Stop reason: HOSPADM

## 2025-04-28 RX ORDER — EZETIMIBE 10 MG/1
10 TABLET ORAL DAILY
Status: DISCONTINUED | OUTPATIENT
Start: 2025-04-29 | End: 2025-05-01 | Stop reason: HOSPADM

## 2025-04-28 RX ORDER — POTASSIUM CHLORIDE 1.5 G/1.58G
40 POWDER, FOR SOLUTION ORAL ONCE
Status: COMPLETED | OUTPATIENT
Start: 2025-04-28 | End: 2025-04-28

## 2025-04-28 RX ORDER — ACETAMINOPHEN 160 MG/5ML
1000 SUSPENSION ORAL EVERY 6 HOURS PRN
Status: DISCONTINUED | OUTPATIENT
Start: 2025-04-28 | End: 2025-05-01 | Stop reason: HOSPADM

## 2025-04-28 RX ORDER — METHOCARBAMOL 500 MG/1
500 TABLET, FILM COATED ORAL EVERY 6 HOURS PRN
Status: DISCONTINUED | OUTPATIENT
Start: 2025-04-28 | End: 2025-05-01 | Stop reason: HOSPADM

## 2025-04-28 RX ORDER — ACETAMINOPHEN 325 MG/1
975 TABLET ORAL EVERY 6 HOURS PRN
Status: DISCONTINUED | OUTPATIENT
Start: 2025-04-28 | End: 2025-05-01 | Stop reason: HOSPADM

## 2025-04-28 RX ORDER — MAGNESIUM SULFATE HEPTAHYDRATE 40 MG/ML
2 INJECTION, SOLUTION INTRAVENOUS ONCE
Status: COMPLETED | OUTPATIENT
Start: 2025-04-28 | End: 2025-04-28

## 2025-04-28 RX ORDER — ENOXAPARIN SODIUM 100 MG/ML
40 INJECTION SUBCUTANEOUS DAILY
Status: DISCONTINUED | OUTPATIENT
Start: 2025-04-29 | End: 2025-05-01 | Stop reason: HOSPADM

## 2025-04-28 RX ORDER — INSULIN LISPRO 100 [IU]/ML
0-5 INJECTION, SOLUTION INTRAVENOUS; SUBCUTANEOUS
Status: DISCONTINUED | OUTPATIENT
Start: 2025-04-28 | End: 2025-05-01 | Stop reason: HOSPADM

## 2025-04-28 RX ADMIN — POTASSIUM CHLORIDE 40 MEQ: 1.5 POWDER, FOR SOLUTION ORAL at 06:23

## 2025-04-28 RX ADMIN — PROPOFOL 45 MCG/KG/MIN: 10 INJECTION, EMULSION INTRAVENOUS at 01:58

## 2025-04-28 RX ADMIN — ASPIRIN 81 MG: 81 TABLET, CHEWABLE ORAL at 09:06

## 2025-04-28 RX ADMIN — Medication 4 L/MIN: at 09:12

## 2025-04-28 RX ADMIN — DEXTROSE MONOHYDRATE AND SODIUM CHLORIDE 75 ML/HR: 5; .9 INJECTION, SOLUTION INTRAVENOUS at 03:47

## 2025-04-28 RX ADMIN — TICAGRELOR 90 MG: 90 TABLET ORAL at 21:42

## 2025-04-28 RX ADMIN — Medication 75 MCG/HR: at 03:48

## 2025-04-28 RX ADMIN — HEPARIN SODIUM 5000 UNITS: 5000 INJECTION, SOLUTION INTRAVENOUS; SUBCUTANEOUS at 06:23

## 2025-04-28 RX ADMIN — PANTOPRAZOLE SODIUM 40 MG: 40 INJECTION, POWDER, FOR SOLUTION INTRAVENOUS at 06:23

## 2025-04-28 RX ADMIN — ATORVASTATIN CALCIUM 80 MG: 80 TABLET, FILM COATED ORAL at 21:42

## 2025-04-28 RX ADMIN — ACETAMINOPHEN 975 MG: 325 TABLET ORAL at 21:42

## 2025-04-28 RX ADMIN — VITAM B12 100 MCG: 100 TAB at 11:52

## 2025-04-28 RX ADMIN — MAGNESIUM SULFATE IN WATER 2 G: 40 INJECTION, SOLUTION INTRAVENOUS at 06:23

## 2025-04-28 RX ADMIN — INSULIN LISPRO 2 UNITS: 100 INJECTION, SOLUTION INTRAVENOUS; SUBCUTANEOUS at 15:49

## 2025-04-28 RX ADMIN — HEPARIN SODIUM 5000 UNITS: 5000 INJECTION, SOLUTION INTRAVENOUS; SUBCUTANEOUS at 15:50

## 2025-04-28 RX ADMIN — Medication 40 PERCENT: at 07:08

## 2025-04-28 RX ADMIN — PSYLLIUM HUSK 1 PACKET: 3.4 POWDER ORAL at 09:06

## 2025-04-28 RX ADMIN — EZETIMIBE 10 MG: 10 TABLET ORAL at 09:06

## 2025-04-28 SDOH — SOCIAL STABILITY: SOCIAL NETWORK
IN A TYPICAL WEEK, HOW MANY TIMES DO YOU TALK ON THE PHONE WITH FAMILY, FRIENDS, OR NEIGHBORS?: MORE THAN THREE TIMES A WEEK

## 2025-04-28 SDOH — HEALTH STABILITY: PHYSICAL HEALTH
HOW OFTEN DO YOU NEED TO HAVE SOMEONE HELP YOU WHEN YOU READ INSTRUCTIONS, PAMPHLETS, OR OTHER WRITTEN MATERIAL FROM YOUR DOCTOR OR PHARMACY?: NEVER

## 2025-04-28 SDOH — HEALTH STABILITY: MENTAL HEALTH: HOW OFTEN DO YOU HAVE SIX OR MORE DRINKS ON ONE OCCASION?: NEVER

## 2025-04-28 SDOH — SOCIAL STABILITY: SOCIAL NETWORK: HOW OFTEN DO YOU GET TOGETHER WITH FRIENDS OR RELATIVES?: MORE THAN THREE TIMES A WEEK

## 2025-04-28 SDOH — HEALTH STABILITY: MENTAL HEALTH
DO YOU FEEL STRESS - TENSE, RESTLESS, NERVOUS, OR ANXIOUS, OR UNABLE TO SLEEP AT NIGHT BECAUSE YOUR MIND IS TROUBLED ALL THE TIME - THESE DAYS?: NOT AT ALL

## 2025-04-28 SDOH — ECONOMIC STABILITY: HOUSING INSECURITY: IN THE LAST 12 MONTHS, WAS THERE A TIME WHEN YOU WERE NOT ABLE TO PAY THE MORTGAGE OR RENT ON TIME?: NO

## 2025-04-28 SDOH — SOCIAL STABILITY: SOCIAL INSECURITY
WITHIN THE LAST YEAR, HAVE YOU BEEN RAPED OR FORCED TO HAVE ANY KIND OF SEXUAL ACTIVITY BY YOUR PARTNER OR EX-PARTNER?: NO

## 2025-04-28 SDOH — SOCIAL STABILITY: SOCIAL INSECURITY: WITHIN THE LAST YEAR, HAVE YOU BEEN HUMILIATED OR EMOTIONALLY ABUSED IN OTHER WAYS BY YOUR PARTNER OR EX-PARTNER?: NO

## 2025-04-28 SDOH — ECONOMIC STABILITY: FOOD INSECURITY: WITHIN THE PAST 12 MONTHS, YOU WORRIED THAT YOUR FOOD WOULD RUN OUT BEFORE YOU GOT THE MONEY TO BUY MORE.: NEVER TRUE

## 2025-04-28 SDOH — SOCIAL STABILITY: SOCIAL NETWORK: HOW OFTEN DO YOU ATTEND MEETINGS OF THE CLUBS OR ORGANIZATIONS YOU BELONG TO?: NEVER

## 2025-04-28 SDOH — ECONOMIC STABILITY: FOOD INSECURITY: HOW HARD IS IT FOR YOU TO PAY FOR THE VERY BASICS LIKE FOOD, HOUSING, MEDICAL CARE, AND HEATING?: NOT HARD AT ALL

## 2025-04-28 SDOH — SOCIAL STABILITY: SOCIAL INSECURITY: WITHIN THE LAST YEAR, HAVE YOU BEEN AFRAID OF YOUR PARTNER OR EX-PARTNER?: NO

## 2025-04-28 SDOH — ECONOMIC STABILITY: FOOD INSECURITY: WITHIN THE PAST 12 MONTHS, THE FOOD YOU BOUGHT JUST DIDN'T LAST AND YOU DIDN'T HAVE MONEY TO GET MORE.: NEVER TRUE

## 2025-04-28 SDOH — HEALTH STABILITY: MENTAL HEALTH: HOW OFTEN DO YOU HAVE A DRINK CONTAINING ALCOHOL?: MONTHLY OR LESS

## 2025-04-28 SDOH — SOCIAL STABILITY: SOCIAL INSECURITY: ARE YOU MARRIED, WIDOWED, DIVORCED, SEPARATED, NEVER MARRIED, OR LIVING WITH A PARTNER?: DIVORCED

## 2025-04-28 SDOH — ECONOMIC STABILITY: HOUSING INSECURITY: AT ANY TIME IN THE PAST 12 MONTHS, WERE YOU HOMELESS OR LIVING IN A SHELTER (INCLUDING NOW)?: NO

## 2025-04-28 SDOH — ECONOMIC STABILITY: HOUSING INSECURITY: IN THE PAST 12 MONTHS, HOW MANY TIMES HAVE YOU MOVED WHERE YOU WERE LIVING?: 0

## 2025-04-28 SDOH — ECONOMIC STABILITY: INCOME INSECURITY: IN THE PAST 12 MONTHS HAS THE ELECTRIC, GAS, OIL, OR WATER COMPANY THREATENED TO SHUT OFF SERVICES IN YOUR HOME?: NO

## 2025-04-28 SDOH — SOCIAL STABILITY: SOCIAL NETWORK
DO YOU BELONG TO ANY CLUBS OR ORGANIZATIONS SUCH AS CHURCH GROUPS, UNIONS, FRATERNAL OR ATHLETIC GROUPS, OR SCHOOL GROUPS?: NO

## 2025-04-28 SDOH — HEALTH STABILITY: PHYSICAL HEALTH: ON AVERAGE, HOW MANY MINUTES DO YOU ENGAGE IN EXERCISE AT THIS LEVEL?: 0 MIN

## 2025-04-28 SDOH — HEALTH STABILITY: MENTAL HEALTH: HOW MANY DRINKS CONTAINING ALCOHOL DO YOU HAVE ON A TYPICAL DAY WHEN YOU ARE DRINKING?: 1 OR 2

## 2025-04-28 SDOH — SOCIAL STABILITY: SOCIAL NETWORK: HOW OFTEN DO YOU ATTEND CHURCH OR RELIGIOUS SERVICES?: NEVER

## 2025-04-28 SDOH — SOCIAL STABILITY: SOCIAL INSECURITY
WITHIN THE LAST YEAR, HAVE YOU BEEN KICKED, HIT, SLAPPED, OR OTHERWISE PHYSICALLY HURT BY YOUR PARTNER OR EX-PARTNER?: NO

## 2025-04-28 SDOH — HEALTH STABILITY: PHYSICAL HEALTH: ON AVERAGE, HOW MANY DAYS PER WEEK DO YOU ENGAGE IN MODERATE TO STRENUOUS EXERCISE (LIKE A BRISK WALK)?: 0 DAYS

## 2025-04-28 SDOH — ECONOMIC STABILITY: TRANSPORTATION INSECURITY: IN THE PAST 12 MONTHS, HAS LACK OF TRANSPORTATION KEPT YOU FROM MEDICAL APPOINTMENTS OR FROM GETTING MEDICATIONS?: NO

## 2025-04-28 ASSESSMENT — PAIN DESCRIPTION - DESCRIPTORS: DESCRIPTORS: ACHING

## 2025-04-28 ASSESSMENT — ENCOUNTER SYMPTOMS
STRIDOR: 0
FEVER: 0
PALPITATIONS: 0
WHEEZING: 0
SHORTNESS OF BREATH: 0

## 2025-04-28 ASSESSMENT — PAIN DESCRIPTION - LOCATION: LOCATION: SHOULDER

## 2025-04-28 ASSESSMENT — PAIN - FUNCTIONAL ASSESSMENT
PAIN_FUNCTIONAL_ASSESSMENT: 0-10
PAIN_FUNCTIONAL_ASSESSMENT: CPOT (CRITICAL CARE PAIN OBSERVATION TOOL)
PAIN_FUNCTIONAL_ASSESSMENT: 0-10
PAIN_FUNCTIONAL_ASSESSMENT: 0-10
PAIN_FUNCTIONAL_ASSESSMENT: CPOT (CRITICAL CARE PAIN OBSERVATION TOOL)

## 2025-04-28 ASSESSMENT — PAIN SCALES - GENERAL
PAINLEVEL_OUTOF10: 0 - NO PAIN
PAINLEVEL_OUTOF10: 0 - NO PAIN
PAINLEVEL_OUTOF10: 3
PAINLEVEL_OUTOF10: 0 - NO PAIN

## 2025-04-28 ASSESSMENT — LIFESTYLE VARIABLES
AUDIT-C TOTAL SCORE: 1
SKIP TO QUESTIONS 9-10: 1

## 2025-04-28 ASSESSMENT — PAIN DESCRIPTION - ORIENTATION: ORIENTATION: RIGHT

## 2025-04-28 ASSESSMENT — ACTIVITIES OF DAILY LIVING (ADL): LACK_OF_TRANSPORTATION: NO

## 2025-04-28 NOTE — PROGRESS NOTES
04/28/25 1257   Discharge Planning   Living Arrangements Friends   Support Systems Family members;Friends/neighbors   Assistance Needed none   Type of Residence Private residence   Do you have animals or pets at home? No   Who is requesting discharge planning? Provider   Home or Post Acute Services None   Expected Discharge Disposition Home   Does the patient need discharge transport arranged? No   Patient Choice   Provider Choice list and CMS website (https://medicare.gov/care-compare#search) for post-acute Quality and Resource Measure Data were provided and reviewed with: Other (Comment)  (no skilled needs anticipated)   Patient / Family choosing to utilize agency / facility established prior to hospitalization No   Stroke Family Assessment   Stroke Family Assessment Needed No

## 2025-04-28 NOTE — CONSULTS
Consults    Reason For Consult  Coronary Artery Disease and Atrial Fibrillation    History Of Present Illness  Mateo Woodall is a 64 y.o. male with PMHx of HTN, DLP, CAD, CHF, multiple MI, and A-fib presenting with altered mental status. Patient was admitted to the ICU yesterday after family members found the patient unconscious/unresponsive with unknown aggravating factors. Lab works insignificant, CT angio of the head revealed no infarct and intracranial hemorrhage, CT angio of head and neck revealed potential thrombosis at the right P1 of PCA, chest X ray is clear, and last EKG was normal sinus rhythm.     Patient last echo in July 2024 revealed 30-35% EF. He has a history of multiple stents and CABG. Home medication includes Lipitor 80 mg, Zeita 10mg, Cozaar 100 mg, Aspirin, and Metoprolol 200 mg with the last two medications currently on hold until hemodynamically stable. Patient was on Brilinta but stopped due to the pricing.     Seen patient today laying in bed and intubated. Patient appeared more awake and able to answer questions appropriately with head nodding yes or head shaking no. Patient denied fever, chest pain, palpitation, SOB. ICU currently planning to extubate the patient.      Past Medical History  He has a past medical history of Acute myocardial infarction (07/07/2023), Alcohol dependence (10/30/2023), Atrial fibrillation (Multi) (10/30/2023), Benign hypertensive heart disease without congestive heart failure (09/04/2023), Congestive heart failure (09/30/2023), Coronary arteriosclerosis in native artery (07/07/2023), Coronary artery disease, Essential hypertension (09/30/2023), High blood cholesterol, HTN (hypertension), Hyperlipidemia (09/04/2023), Impaired perfusion of peripheral tissue (10/30/2023), MI (myocardial infarction) (Multi) (2010), MI (myocardial infarction) (Multi), Postsurgical percutaneous transluminal coronary angioplasty status (09/04/2023), Stenosis of coronary artery stent  "(07/07/2023), Stricture of artery (CMS-HCC) (08/29/2023), Tobacco dependence (09/04/2023), and VT (ventricular tachycardia) (Multi) (07/07/2023).    Surgical History  He has a past surgical history that includes Coronary artery bypass graft; orthopedic surgery; orthopedic surgery; Coronary angioplasty with stent; and Coronary angioplasty with stent.     Social History  He reports that he quit smoking about 18 months ago. His smoking use included cigarettes. He started smoking about 14 months ago. He has a 17.5 pack-year smoking history. He has been exposed to tobacco smoke. He has never used smokeless tobacco. He reports that he does not currently use alcohol. He reports that he does not use drugs.    Family History  Family History[1]     Allergies  Patient has no known allergies.    Review of Systems   Constitutional:  Negative for fever.   Respiratory:  Negative for shortness of breath, wheezing and stridor.    Cardiovascular:  Negative for chest pain and palpitations.        Physical Exam  Constitutional:       Appearance: Normal appearance.   Cardiovascular:      Rate and Rhythm: Normal rate and regular rhythm.      Pulses: Normal pulses.      Heart sounds: Normal heart sounds. No murmur heard.     No friction rub. No gallop.   Pulmonary:      Effort: Pulmonary effort is normal. No respiratory distress.      Breath sounds: Normal breath sounds. No stridor. No wheezing or rales.      Comments: Currently intubated. ICU working on extubate the patient.   Musculoskeletal:         General: No swelling.      Right lower leg: No edema.      Left lower leg: No edema.   Neurological:      Mental Status: He is alert.          Last Recorded Vitals  Blood pressure 102/60, pulse 78, temperature 36.7 °C (98.1 °F), temperature source Axillary, resp. rate 15, height 1.702 m (5' 7\"), weight 61.6 kg (135 lb 12.9 oz), SpO2 100%.    Relevant Results  Results for orders placed or performed during the hospital encounter of 04/27/25 " (from the past 24 hours)   POCT GLUCOSE   Result Value Ref Range    POCT Glucose 106 (H) 74 - 99 mg/dL   Blood Gas Arterial Full Panel   Result Value Ref Range    POCT pH, Arterial 7.41 7.38 - 7.42 pH    POCT pCO2, Arterial 45 (H) 38 - 42 mm Hg    POCT pO2, Arterial 146 (H) 85 - 95 mm Hg    POCT SO2, Arterial 100 94 - 100 %    POCT Oxy Hemoglobin, Arterial 96.7 94.0 - 98.0 %    POCT Hematocrit Calculated, Arterial 40.0 (L) 41.0 - 52.0 %    POCT Sodium, Arterial 138 136 - 145 mmol/L    POCT Potassium, Arterial 3.7 3.5 - 5.3 mmol/L    POCT Chloride, Arterial 111 (H) 98 - 107 mmol/L    POCT Ionized Calcium, Arterial 1.23 1.10 - 1.33 mmol/L    POCT Glucose, Arterial 102 (H) 74 - 99 mg/dL    POCT Lactate, Arterial 0.6 0.4 - 2.0 mmol/L    POCT Base Excess, Arterial 3.2 (H) -2.0 - 3.0 mmol/L    POCT HCO3 Calculated, Arterial 28.5 (H) 22.0 - 26.0 mmol/L    POCT Hemoglobin, Arterial 13.4 (L) 13.5 - 17.5 g/dL    POCT Anion Gap, Arterial 2 (L) 10 - 25 mmo/L    Patient Temperature 37.0 degrees Celsius    FiO2 40 %    Ventilator Mode Other     Ventilator Rate 15 bpm    Tidal Volume 400 mL    Peep CHM2O 5.0 cm H2O    Site of Arterial Puncture Radial Left     Andres's Test Positive    POCT GLUCOSE   Result Value Ref Range    POCT Glucose 109 (H) 74 - 99 mg/dL   TSH with reflex to Free T4 if abnormal   Result Value Ref Range    Thyroid Stimulating Hormone 1.56 0.44 - 3.98 mIU/L   Ammonia   Result Value Ref Range    Ammonia 26 16 - 53 umol/L   POCT GLUCOSE   Result Value Ref Range    POCT Glucose 102 (H) 74 - 99 mg/dL   POCT GLUCOSE   Result Value Ref Range    POCT Glucose 116 (H) 74 - 99 mg/dL   CBC and Auto Differential   Result Value Ref Range    WBC 10.0 4.4 - 11.3 x10*3/uL    nRBC 0.0 0.0 - 0.0 /100 WBCs    RBC 3.52 (L) 4.50 - 5.90 x10*6/uL    Hemoglobin 11.9 (L) 13.5 - 17.5 g/dL    Hematocrit 35.5 (L) 41.0 - 52.0 %     (H) 80 - 100 fL    MCH 33.8 26.0 - 34.0 pg    MCHC 33.5 32.0 - 36.0 g/dL    RDW 13.2 11.5 - 14.5 %     Platelets 137 (L) 150 - 450 x10*3/uL    Neutrophils % 73.4 40.0 - 80.0 %    Immature Granulocytes %, Automated 0.4 0.0 - 0.9 %    Lymphocytes % 16.5 13.0 - 44.0 %    Monocytes % 6.7 2.0 - 10.0 %    Eosinophils % 2.6 0.0 - 6.0 %    Basophils % 0.4 0.0 - 2.0 %    Neutrophils Absolute 7.37 1.20 - 7.70 x10*3/uL    Immature Granulocytes Absolute, Automated 0.04 0.00 - 0.70 x10*3/uL    Lymphocytes Absolute 1.66 1.20 - 4.80 x10*3/uL    Monocytes Absolute 0.67 0.10 - 1.00 x10*3/uL    Eosinophils Absolute 0.26 0.00 - 0.70 x10*3/uL    Basophils Absolute 0.04 0.00 - 0.10 x10*3/uL   Comprehensive Metabolic Panel   Result Value Ref Range    Glucose 112 (H) 74 - 99 mg/dL    Sodium 143 136 - 145 mmol/L    Potassium 3.5 3.5 - 5.3 mmol/L    Chloride 111 (H) 98 - 107 mmol/L    Bicarbonate 27 21 - 32 mmol/L    Anion Gap 9 (L) 10 - 20 mmol/L    Urea Nitrogen 10 6 - 23 mg/dL    Creatinine 0.92 0.50 - 1.30 mg/dL    eGFR >90 >60 mL/min/1.73m*2    Calcium 8.2 (L) 8.6 - 10.3 mg/dL    Albumin 3.1 (L) 3.4 - 5.0 g/dL    Alkaline Phosphatase 43 33 - 136 U/L    Total Protein 5.8 (L) 6.4 - 8.2 g/dL    AST 14 9 - 39 U/L    Bilirubin, Total 0.3 0.0 - 1.2 mg/dL    ALT 8 (L) 10 - 52 U/L   Magnesium   Result Value Ref Range    Magnesium 1.84 1.60 - 2.40 mg/dL   Phosphorus   Result Value Ref Range    Phosphorus 3.9 2.5 - 4.9 mg/dL   Blood Gas Arterial Full Panel   Result Value Ref Range    POCT pH, Arterial 7.36 (L) 7.38 - 7.42 pH    POCT pCO2, Arterial 51 (H) 38 - 42 mm Hg    POCT pO2, Arterial 127 (H) 85 - 95 mm Hg    POCT SO2, Arterial 100 94 - 100 %    POCT Oxy Hemoglobin, Arterial 97.1 94.0 - 98.0 %    POCT Hematocrit Calculated, Arterial 37.0 (L) 41.0 - 52.0 %    POCT Sodium, Arterial 137 136 - 145 mmol/L    POCT Potassium, Arterial 3.5 3.5 - 5.3 mmol/L    POCT Chloride, Arterial 111 (H) 98 - 107 mmol/L    POCT Ionized Calcium, Arterial 1.23 1.10 - 1.33 mmol/L    POCT Glucose, Arterial 111 (H) 74 - 99 mg/dL    POCT Lactate, Arterial 0.5 0.4 - 2.0  mmol/L    POCT Base Excess, Arterial 2.5 -2.0 - 3.0 mmol/L    POCT HCO3 Calculated, Arterial 28.8 (H) 22.0 - 26.0 mmol/L    POCT Hemoglobin, Arterial 12.2 (L) 13.5 - 17.5 g/dL    POCT Anion Gap, Arterial 1 (L) 10 - 25 mmo/L    Patient Temperature 37.0 degrees Celsius    FiO2 40 %    Apparatus      Ventilator Mode      Ventilator Rate 15 bpm    Tidal Volume 400 mL    Peep CHM2O 5.0 cm H2O    Site of Arterial Puncture Radial Left     Andres's Test Positive    POCT GLUCOSE   Result Value Ref Range    POCT Glucose 115 (H) 74 - 99 mg/dL    ECG 12 lead  Result Date: 4/28/2025  Normal sinus rhythm with sinus arrhythmia T wave abnormality, consider inferior ischemia QTcB >= 480 msec Abnormal ECG No previous ECGs available    XR chest 1 view  Result Date: 4/27/2025  Interpreted By:  Abbey Peterson, STUDY: XR CHEST 1 VIEW 4/27/2025 4:10 pm   INDICATION: Signs/Symptoms:Replaced OG tube.   COMPARISON: 04/27/2025 done at 11:15 a.m.   ACCESSION NUMBER(S): [id number]   ORDERING CLINICIAN: JHONY EDWARDS   TECHNIQUE: AP semi-erect view of the chest   FINDINGS: The tip of the endotracheal tube is located 4 cm above anayeli. Nasogastric tube terminates at the junction of the fundus and proximal gastric body with side hole at the level of the gastric cardia. There are surgical clips seen in profile with the right hilum and in profile with the left hilum and left side of the mediastinum.   The lungs are clear without pleural abnormality. Cardiac size is normal.       Satisfactory positioning of the endotracheal tube and nasogastric tube.   Postoperative findings without acute cardiopulmonary process.   Signed by: Abbey Peterson 4/27/2025 4:25 PM Dictation workstation:   [id number]    XR chest 1 view  Result Date: 4/27/2025  Interpreted By:  Obdulia Michaud, STUDY: XR CHEST 1 VIEW;  4/27/2025 11:30 am   INDICATION: Signs/Symptoms:Unresponsive.     COMPARISON: 10/10/2023   ACCESSION NUMBER(S): [id number]   ORDERING CLINICIAN: ZAKIA ALEXANDRE    FINDINGS: Two views were obtained. Artifact related to overlying monitoring leads. ET tube in place with tip approximately 5.9 cm above the anayeli. NG tube in place with tip overlying the left upper quadrant in the region of the proximal stomach. No focal infiltrate, pleural effusion or pneumothorax identified. Right lower chest presumed nipple shadow in the area of nipple marker on the previous exam. The cardiac silhouette is within normal limits for size. Dense presumed surgical material in the right upper quadrant.       Support devices as above. No focal infiltrate.   MACRO: None.   Signed by: Obdulia Michaud 4/27/2025 12:09 PM Dictation workstation:   [id number]    CT brain attack angio head and neck W and WO IV contrast  Result Date: 4/27/2025  Interpreted By:  Ismael Crowley, STUDY: CT BRAIN ATTACK ANGIO HEAD AND NECK W AND WO IV CONTRAST;  4/27/2025 10:34 am   INDICATION: Signs/Symptoms:Unresponsive.     COMPARISON: Noncontrast head CT 04/27/2025 10:17 a.m..   ACCESSION NUMBER(S): [id number]   ORDERING CLINICIAN: ZAKIA ALEXANDRE   TECHNIQUE: 75 ML of Omnipaque 350 was administered intravenously and axial images of the head and neck were acquired.  Coronal, sagittal, and 3-D reconstructions were provided for review.   FINDINGS: Findings CT angiography neck: Origins of the great vessels are unremarkable. Common carotid arteries are patent. Minimal to moderate left-greater-than-right atherosclerotic calcification of the carotid bifurcations without evidence of measurable stenosis by NASCET criteria. Left vertebral artery is dominant in the region of the neck and is patent. Right vertebral artery is diminutive in caliber but appears to be patent.   Findings CT angiography head: Distal internal carotid arteries are patent and branch appropriately into the anterior and middle cerebral arteries without evidence of hemodynamically significant stenosis or other abnormality identified. The distal left vertebral artery is  patent. The distal right vertebral artery demonstrates subtle change in caliber at the right V3 V4 junction and is somewhat ready in opacification with patent right PICA. The basilar artery appears to be patent. The proximal left posterior cerebral artery is patent. There may be short segment occlusion of the right P1 posterior cerebral artery for instance on image 116 series 6 with distal reconstitution via a small right posterior communicating artery given relative calibers of these vessels. Posterior communicating artery not clearly visualized on the left side.       Findings suggesting potential thrombosis rather than hypoplasia of the right P1 posterior cerebral artery with distal reconstitution via a small right posterior communicating artery. Diminutive and thready appearance of the right distal vertebral artery beyond the right V3 V4 junction. Please correlate clinically with presenting complaints and consider MRI/MRA for instance for further evaluation if clinically indicated.   MACRO: Ismael Crowley discussed the significance and urgency of this critical finding by telephone with  Dr. Castro on 4/27/2025 at 10:49 am. (**-RCF-**) Findings:  See findings.   Signed by: Ismael Crowley 4/27/2025 10:59 AM Dictation workstation:   [id number]    CT brain attack head wo IV contrast  Result Date: 4/27/2025  Interpreted By:  Salazar Ko, STUDY: CT BRAIN ATTACK HEAD WO IV CONTRAST;  4/27/2025 10:30 am   INDICATION: Signs/Symptoms:Stroke Evaluation.   COMPARISON: None.   ACCESSION NUMBER(S): [id number]   ORDERING CLINICIAN: ZAKIA ALEXANDRE   TECHNIQUE: Noncontrast axial CT scan of head was performed.   FINDINGS: Parenchyma: No evidence of acute large vessel territory infarct. No acute intracranial hemorrhage. No mass effect or midline shift.Scattered periventricular white matter hypodensities, likely chronic microvascular ischemic change.   CSF Spaces: The ventricles, sulci and basal cisterns are within normal limits for  age.   Extra-Axial Fluid: None.   Calvarium: No significant abnormality.   Paranasal sinuses: Clear.   Mastoids: Clear.   Orbits: No significant abnormality.   Soft tissues: No significant abnormality.       No CT evidence of acute large vessel territory infarct or intracranial hemorrhage. Please correlate with the separately reported CT angiogram and consider MRI as warranted.   MACRO: Salazar Ko discussed the significance and urgency of this critical finding by Epic secure chat with  ZAKIA ALEXANDRE on 4/27/2025 at 10:40 am.  (**-RCF-**) Findings:  See findings.   Signed by: Salazar Ko 4/27/2025 10:57 AM Dictation workstation:   [id number]        Assessment/Plan     Altered mental status due to unknown etiology:   Neurology consulted, and possible etiology included hypercapnia vs seizure vs stroke. MRI of the brain is currently pending. CT angio of the neck revealed potential thrombotic at the right P1 of PCA.   A-fib:   Cardiology was consulted for this, specifically regarding restarting Brilinta or a different antiplatelet medication due to pricing. Instead of Brilinta, consider Plavix.    Current EKG revealed normal sinus rhythm.   CAD:   As above. Consider Plavix as a substitute.   CHF:    Last echo in July 2024 revealed EF of 30-35%. Continue current medication regime, holding Cozaar and Metoprolol as the BP seemed to be on the lower end of 100 and high 90 systolic.                  [1]   Family History  Problem Relation Name Age of Onset    Other (old age) Mother      Other (cabg x3) Father      Prostate cancer Father

## 2025-04-28 NOTE — PROGRESS NOTES
04/28/25 1248   Physical Activity   On average, how many days per week do you engage in moderate to strenuous exercise (like a brisk walk)? 0 days   On average, how many minutes do you engage in exercise at this level? 0 min   Financial Resource Strain   How hard is it for you to pay for the very basics like food, housing, medical care, and heating? Not hard   Housing Stability   In the last 12 months, was there a time when you were not able to pay the mortgage or rent on time? N   In the past 12 months, how many times have you moved where you were living? 0   At any time in the past 12 months, were you homeless or living in a shelter (including now)? N   Transportation Needs   In the past 12 months, has lack of transportation kept you from medical appointments or from getting medications? no   In the past 12 months, has lack of transportation kept you from meetings, work, or from getting things needed for daily living? No   Food Insecurity   Within the past 12 months, you worried that your food would run out before you got the money to buy more. Never true   Within the past 12 months, the food you bought just didn't last and you didn't have money to get more. Never true   Stress   Do you feel stress - tense, restless, nervous, or anxious, or unable to sleep at night because your mind is troubled all the time - these days? Not at all   Social Connections   In a typical week, how many times do you talk on the phone with family, friends, or neighbors? More than 3   How often do you get together with friends or relatives? More than 3   How often do you attend Religion or Islam services? Never   Do you belong to any clubs or organizations such as Religion groups, unions, fraternal or athletic groups, or school groups? No   How often do you attend meetings of the clubs or organizations you belong to? Never   Are you , , , , never , or living with a partner?    Intimate  Partner Violence   Within the last year, have you been afraid of your partner or ex-partner? No   Within the last year, have you been humiliated or emotionally abused in other ways by your partner or ex-partner? No   Within the last year, have you been kicked, hit, slapped, or otherwise physically hurt by your partner or ex-partner? No   Within the last year, have you been raped or forced to have any kind of sexual activity by your partner or ex-partner? No   Alcohol Use   Q1: How often do you have a drink containing alcohol? Monthly or l   Q2: How many drinks containing alcohol do you have on a typical day when you are drinking? 1 or 2   Q3: How often do you have six or more drinks on one occasion? Never   Utilities   In the past 12 months has the electric, gas, oil, or water company threatened to shut off services in your home? No   Health Literacy   How often do you need to have someone help you when you read instructions, pamphlets, or other written material from your doctor or pharmacy? Never   Follow-Ups   We make community resources available to all of our patients to assist with everyday needs. We may be able to connect you with those resources. Would you be interested? N

## 2025-04-28 NOTE — CONSULTS
Inpatient consult to Cardiology  Consult performed by: Sunil Duron DO  Consult ordered by: Christi Disla MD  Reason for consult: ASHD      See consult written by med student Carina Herrera and attested by myself

## 2025-04-28 NOTE — CARE PLAN
Problem: Pain - Adult  Goal: Verbalizes/displays adequate comfort level or baseline comfort level  Outcome: Progressing     Problem: Safety - Adult  Goal: Free from fall injury  Outcome: Progressing     Problem: Discharge Planning  Goal: Discharge to home or other facility with appropriate resources  Outcome: Progressing     Problem: Chronic Conditions and Co-morbidities  Goal: Patient's chronic conditions and co-morbidity symptoms are monitored and maintained or improved  Outcome: Progressing     Problem: Nutrition  Goal: Nutrient intake appropriate for maintaining nutritional needs  Outcome: Progressing     Problem: Fall/Injury  Goal: Not fall by end of shift  Outcome: Progressing  Goal: Be free from injury by end of the shift  Outcome: Progressing  Goal: Verbalize understanding of personal risk factors for fall in the hospital  Outcome: Progressing  Goal: Verbalize understanding of risk factor reduction measures to prevent injury from fall in the home  Outcome: Progressing  Goal: Use assistive devices by end of the shift  Outcome: Progressing  Goal: Pace activities to prevent fatigue by end of the shift  Outcome: Progressing     Problem: Pain  Goal: Takes deep breaths with improved pain control throughout the shift  Outcome: Progressing  Goal: Turns in bed with improved pain control throughout the shift  Outcome: Progressing  Goal: Walks with improved pain control throughout the shift  Outcome: Progressing  Goal: Performs ADL's with improved pain control throughout shift  Outcome: Progressing  Goal: Participates in PT with improved pain control throughout the shift  Outcome: Progressing  Goal: Free from opioid side effects throughout the shift  Outcome: Progressing  Goal: Free from acute confusion related to pain meds throughout the shift  Outcome: Progressing     Problem: Physical Restraint  Goal: I will require minimum level of restraint  Outcome: Progressing     Problem: Safety - Medical Restraint  Goal:  Remains free of injury from restraints (Restraint for Interference with Medical Device)  Outcome: Progressing  Goal: Free from restraint(s) (Restraint for Interference with Medical Device)  Outcome: Progressing     Problem: Skin  Goal: Decreased wound size/increased tissue granulation at next dressing change  Outcome: Progressing  Goal: Participates in plan/prevention/treatment measures  Outcome: Progressing  Goal: Prevent/manage excess moisture  Outcome: Progressing  Goal: Prevent/minimize sheer/friction injuries  Outcome: Progressing  Goal: Promote/optimize nutrition  Outcome: Progressing  Goal: Promote skin healing  Outcome: Progressing   The patient's goals for the shift include  unable to assess    The clinical goals for the shift include remain hemodynamically stable, tolerate vent settings, keep comfortable, monitor neuro status    Over the shift, the patient did make progress toward the following goals.

## 2025-04-28 NOTE — PROGRESS NOTES
Physical Therapy                 Therapy Communication Note    Patient Name: Mateo Woodall  MRN: [id number]  Department: [facility] N ICU  Room: 09/09-A  Today's Date: 4/28/2025     Discipline: Physical Therapy    PT Missed Visit: Yes     Missed Visit Reason: Missed Visit Reason: Cancel (RN is reporting new vision changes and is wanting the MD to change the order for his MRI to STAT. pt currently has strict bedrest orders x 48 hours as well. PT eval deferred this date.)    Missed Time: Cancel    Comment: Attempt 1315

## 2025-04-28 NOTE — PROGRESS NOTES
"Subjective   Pt today is extubated, awake, without cognitive deficit. He does c/o weakness in his RUE localizing to C5, as well as restriction of vertical gaze.     Objective   Neurological Exam  Physical Exam    Last Recorded Vitals  Blood pressure 142/81, pulse 89, temperature 37.3 °C (99.1 °F), temperature source Oral, resp. rate 24, height 1.702 m (5' 7\"), weight 61.6 kg (135 lb 12.9 oz), SpO2 98%.      Neurologically, pt is awake and oriented x4. Attention, concentration, memory, cortical processing are intact. No aphasia  Cranial nerves: VFF, lateral gaze is intact while attempt at upward vertical gaze results in minute nystagmus and he is unable to lower his eyes, No horizontal nystagmus, Face is symmetric to sensory and motor, no dysarthria, Tongue protrudes midline, Shrug symmetric  Motor: 4/5 strength BLE and LUE while above shoulder height on the R he has difficulty raising the arm and efforts to do so result in significant tremor  Sensation is intact bilaterally throughout  Coordination: no ataxia, no dysmetria/dysdiadochokinesia       Scheduled medications  Scheduled Medications[1]  Continuous medications  Continuous Medications[2]  PRN medications  PRN Medications[3]     Results for orders placed or performed during the hospital encounter of 04/27/25 (from the past 96 hours)   POCT GLUCOSE   Result Value Ref Range    POCT Glucose 106 (H) 74 - 99 mg/dL   Blood Gas Arterial Full Panel   Result Value Ref Range    POCT pH, Arterial 7.41 7.38 - 7.42 pH    POCT pCO2, Arterial 45 (H) 38 - 42 mm Hg    POCT pO2, Arterial 146 (H) 85 - 95 mm Hg    POCT SO2, Arterial 100 94 - 100 %    POCT Oxy Hemoglobin, Arterial 96.7 94.0 - 98.0 %    POCT Hematocrit Calculated, Arterial 40.0 (L) 41.0 - 52.0 %    POCT Sodium, Arterial 138 136 - 145 mmol/L    POCT Potassium, Arterial 3.7 3.5 - 5.3 mmol/L    POCT Chloride, Arterial 111 (H) 98 - 107 mmol/L    POCT Ionized Calcium, Arterial 1.23 1.10 - 1.33 mmol/L    POCT Glucose, " Arterial 102 (H) 74 - 99 mg/dL    POCT Lactate, Arterial 0.6 0.4 - 2.0 mmol/L    POCT Base Excess, Arterial 3.2 (H) -2.0 - 3.0 mmol/L    POCT HCO3 Calculated, Arterial 28.5 (H) 22.0 - 26.0 mmol/L    POCT Hemoglobin, Arterial 13.4 (L) 13.5 - 17.5 g/dL    POCT Anion Gap, Arterial 2 (L) 10 - 25 mmo/L    Patient Temperature 37.0 degrees Celsius    FiO2 40 %    Ventilator Mode Other     Ventilator Rate 15 bpm    Tidal Volume 400 mL    Peep CHM2O 5.0 cm H2O    Site of Arterial Puncture Radial Left     Andres's Test Positive    POCT GLUCOSE   Result Value Ref Range    POCT Glucose 109 (H) 74 - 99 mg/dL   Vitamin B12   Result Value Ref Range    Vitamin B12 180 (L) 211 - 911 pg/mL   Vitamin D 25-Hydroxy,Total (for eval of Vitamin D levels)   Result Value Ref Range    Vitamin D, 25-Hydroxy, Total 37 30 - 100 ng/mL   TSH with reflex to Free T4 if abnormal   Result Value Ref Range    Thyroid Stimulating Hormone 1.56 0.44 - 3.98 mIU/L   Ammonia   Result Value Ref Range    Ammonia 26 16 - 53 umol/L   POCT GLUCOSE   Result Value Ref Range    POCT Glucose 102 (H) 74 - 99 mg/dL   POCT GLUCOSE   Result Value Ref Range    POCT Glucose 116 (H) 74 - 99 mg/dL   CBC and Auto Differential   Result Value Ref Range    WBC 10.0 4.4 - 11.3 x10*3/uL    nRBC 0.0 0.0 - 0.0 /100 WBCs    RBC 3.52 (L) 4.50 - 5.90 x10*6/uL    Hemoglobin 11.9 (L) 13.5 - 17.5 g/dL    Hematocrit 35.5 (L) 41.0 - 52.0 %     (H) 80 - 100 fL    MCH 33.8 26.0 - 34.0 pg    MCHC 33.5 32.0 - 36.0 g/dL    RDW 13.2 11.5 - 14.5 %    Platelets 137 (L) 150 - 450 x10*3/uL    Neutrophils % 73.4 40.0 - 80.0 %    Immature Granulocytes %, Automated 0.4 0.0 - 0.9 %    Lymphocytes % 16.5 13.0 - 44.0 %    Monocytes % 6.7 2.0 - 10.0 %    Eosinophils % 2.6 0.0 - 6.0 %    Basophils % 0.4 0.0 - 2.0 %    Neutrophils Absolute 7.37 1.20 - 7.70 x10*3/uL    Immature Granulocytes Absolute, Automated 0.04 0.00 - 0.70 x10*3/uL    Lymphocytes Absolute 1.66 1.20 - 4.80 x10*3/uL    Monocytes  Absolute 0.67 0.10 - 1.00 x10*3/uL    Eosinophils Absolute 0.26 0.00 - 0.70 x10*3/uL    Basophils Absolute 0.04 0.00 - 0.10 x10*3/uL   Comprehensive Metabolic Panel   Result Value Ref Range    Glucose 112 (H) 74 - 99 mg/dL    Sodium 143 136 - 145 mmol/L    Potassium 3.5 3.5 - 5.3 mmol/L    Chloride 111 (H) 98 - 107 mmol/L    Bicarbonate 27 21 - 32 mmol/L    Anion Gap 9 (L) 10 - 20 mmol/L    Urea Nitrogen 10 6 - 23 mg/dL    Creatinine 0.92 0.50 - 1.30 mg/dL    eGFR >90 >60 mL/min/1.73m*2    Calcium 8.2 (L) 8.6 - 10.3 mg/dL    Albumin 3.1 (L) 3.4 - 5.0 g/dL    Alkaline Phosphatase 43 33 - 136 U/L    Total Protein 5.8 (L) 6.4 - 8.2 g/dL    AST 14 9 - 39 U/L    Bilirubin, Total 0.3 0.0 - 1.2 mg/dL    ALT 8 (L) 10 - 52 U/L   Magnesium   Result Value Ref Range    Magnesium 1.84 1.60 - 2.40 mg/dL   Phosphorus   Result Value Ref Range    Phosphorus 3.9 2.5 - 4.9 mg/dL   Blood Gas Arterial Full Panel   Result Value Ref Range    POCT pH, Arterial 7.36 (L) 7.38 - 7.42 pH    POCT pCO2, Arterial 51 (H) 38 - 42 mm Hg    POCT pO2, Arterial 127 (H) 85 - 95 mm Hg    POCT SO2, Arterial 100 94 - 100 %    POCT Oxy Hemoglobin, Arterial 97.1 94.0 - 98.0 %    POCT Hematocrit Calculated, Arterial 37.0 (L) 41.0 - 52.0 %    POCT Sodium, Arterial 137 136 - 145 mmol/L    POCT Potassium, Arterial 3.5 3.5 - 5.3 mmol/L    POCT Chloride, Arterial 111 (H) 98 - 107 mmol/L    POCT Ionized Calcium, Arterial 1.23 1.10 - 1.33 mmol/L    POCT Glucose, Arterial 111 (H) 74 - 99 mg/dL    POCT Lactate, Arterial 0.5 0.4 - 2.0 mmol/L    POCT Base Excess, Arterial 2.5 -2.0 - 3.0 mmol/L    POCT HCO3 Calculated, Arterial 28.8 (H) 22.0 - 26.0 mmol/L    POCT Hemoglobin, Arterial 12.2 (L) 13.5 - 17.5 g/dL    POCT Anion Gap, Arterial 1 (L) 10 - 25 mmo/L    Patient Temperature 37.0 degrees Celsius    FiO2 40 %    Apparatus      Ventilator Mode      Ventilator Rate 15 bpm    Tidal Volume 400 mL    Peep CHM2O 5.0 cm H2O    Site of Arterial Puncture Radial Left      Andres's Test Positive    POCT GLUCOSE   Result Value Ref Range    POCT Glucose 115 (H) 74 - 99 mg/dL   Blood Gas Venous   Result Value Ref Range    POCT pH, Venous 7.37 7.33 - 7.43 pH    POCT pCO2, Venous 45 41 - 51 mm Hg    POCT pO2, Venous 77 (H) 35 - 45 mm Hg    POCT SO2, Venous 96 (H) 45 - 75 %    POCT Oxy Hemoglobin, Venous 93.3 (H) 45.0 - 75.0 %    POCT Base Excess, Venous 0.3 -2.0 - 3.0 mmol/L    POCT HCO3 Calculated, Venous 26.0 22.0 - 26.0 mmol/L    Patient Temperature 37.0 degrees Celsius    FiO2 40 %   POCT GLUCOSE   Result Value Ref Range    POCT Glucose 131 (H) 74 - 99 mg/dL          Imaging  XR chest 1 view  Result Date: 4/27/2025  Satisfactory positioning of the endotracheal tube and nasogastric tube.   Postoperative findings without acute cardiopulmonary process.   Signed by: Abbey Peterson 4/27/2025 4:25 PM Dictation workstation:   [id number]    XR chest 1 view  Result Date: 4/27/2025  Support devices as above. No focal infiltrate.   MACRO: None.   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 Diminutive and thready appearance of the right distal vertebral artery beyond the right V3 V4 junction. Please correlate clinically with presenting complaints and consider MRI/MRA for instance for further evaluation if clinically indicated.   MACRO: Ismael Crowley discussed the significance and urgency of this critical finding by telephone with  Dr. Castro on 4/27/2025 at 10:49 am. (**-RCF-**) Findings:  See findings.   Signed by: Ismael Crowley 4/27/2025 10:59 AM Dictation workstation:   [id number]    CT brain attack head wo IV contrast  Result Date: 4/27/2025  No CT evidence of acute large vessel territory infarct or  intracranial hemorrhage. Please correlate with the separately reported CT angiogram and consider MRI as warranted.   MACRO: Salazar Ko discussed the significance and urgency of this critical finding by Epic secure chat with  ZAKIA ALEXANDRE on 4/27/2025 at 10:40 am.  (**-RCF-**) Findings:  See findings.   Signed by: Salazar Ko 4/27/2025 10:57 AM Dictation workstation:   [id number]      Cardiology, Vascular, and Other Imaging  ECG 12 lead  Result Date: 4/28/2025  Normal sinus rhythm with sinus arrhythmia T wave abnormality, consider inferior ischemia QTcB >= 480 msec Abnormal ECG No previous ECGs available         Assessment/Plan   Assessment & Plan  AMS (altered mental status)    63 yo M with vertical gaze restriction as well as apparent injury at the level of C5 on the R.  Will get MRI Csp as well as brain and follow up results.         I personally spent 36 minutes today, exclusive of procedures, providing care for this patient, including preparation, face to face time, documentation and other services such as review of medical records, diagnostic result, patient education, counseling, coordination of care as specified in the encounter.        [1] aspirin, 81 mg, orogastric tube, Daily  atorvastatin, 80 mg, orogastric tube, Nightly  cyanocobalamin, 100 mcg, oral, Daily  ezetimibe, 10 mg, orogastric tube, Daily  heparin (porcine), 5,000 Units, subcutaneous, q8h  insulin lispro, 0-5 Units, subcutaneous, q4h  [Held by provider] losartan, 100 mg, oral, Daily  metoprolol succinate XL, 50 mg, oral, Daily  oxygen, , inhalation, Continuous - Inhalation  psyllium, 1 packet, oral, Daily  ticagrelor, 90 mg, oral, BID  [2] D5 % and 0.9 % sodium chloride, 75 mL/hr, Last Rate: 75 mL/hr (04/28/25 1346)  [3] PRN medications: dextrose, dextrose, glucagon, glucagon

## 2025-04-28 NOTE — CONSULTS
"Nutrition Assessement Note    Nutrition Assessment    Reason for Assessment: Enteral assessment/recommendation (TF)    Reason for Hospital Admission:  Mateo Woodall is a 64 y.o. male who is admitted for AMS. Found unresponsive. Intubate in ED. CT scan with no acute findings. Patient extubated this morning. Reports good appetite but states 35# weigh loss in past year. Records indicate stable weight. Will provide Ensure Plus High Protein TID when diet resumes. Spoke with RN. Patient passed swallow eval, awaiting diet order.    Malnutrition Screening Tool (MST)  Have you recently lost weight without trying?: Yes  If yes, how much weight have you lost?: Unsure  Weight Loss Score: 2  Have you been eating poorly because of a decreased appetite?: Yes  Malnutrition Score: 3  Nutrition Screen  Stage 3 or 4 Pressure Injury or Multiple Non-Healing Wounds: No  Home Tube Feeding or Total Parenteral Nutrition (TPN): No  Dietitian Consult Needed: Yes (Comment) (less than ideal body weight)  Reason for Consult: Patient appears malnourished    Medical History[1]   Surgical History[2]    Nutrition History:  Energy Intake: Good > 75 %  Food and Nutrient History: patient reports good appetite     Anthropometrics:  Ht: 170.2 cm (5' 7\"), Wt: 61.6 kg (135 lb 12.9 oz), BMI: 21.26     Weight Change:  Daily Weight  04/28/25 : 61.6 kg (135 lb 12.9 oz)  04/27/25 : 55.2 kg (121 lb 11.1 oz)  09/20/24 : 58.9 kg (129 lb 13.6 oz)  08/20/24 : 58.1 kg (128 lb)  07/08/24 : 58.1 kg (128 lb)  03/26/24 : 60 kg (132 lb 3.2 oz)  01/24/24 : 61.9 kg (136 lb 6.4 oz)  10/31/23 : 60.3 kg (133 lb)  10/10/23 : 61.7 kg (136 lb)  07/28/23 : 61.7 kg (136 lb)    Weight History / % Weight Change: patient reprots 35# (20.5%) weight loss in past year, records do not support statement  Significant Weight Loss: No        Nutrition Focused Physical Exam Findings: defer: not appropriate at time of visit.     Nutrition Significant Labs:  Lab Results   Component Value Date    " WBC 10.0 04/28/2025    HGB 11.9 (L) 04/28/2025    HCT 35.5 (L) 04/28/2025     (L) 04/28/2025    CHOL 172 01/27/2024    TRIG 67 01/27/2024    HDL 75.0 01/27/2024    ALT 8 (L) 04/28/2025    AST 14 04/28/2025     04/28/2025    K 3.5 04/28/2025     (H) 04/28/2025    CREATININE 0.92 04/28/2025    BUN 10 04/28/2025    CO2 27 04/28/2025    TSH 1.56 04/27/2025    INR 1.1 04/27/2025    HGBA1C 5.4 08/29/2023     Nutrition Specific Medications:  Scheduled Medications[3]  Continuous Medications[4]    Dietary Orders (From admission, onward)       Start     Ordered    04/28/25 1429  Oral nutritional supplements  Until discontinued        Comments: strawberry   Question Answer Comment   Deliver with All meals    Select supplement: Ensure Plus High Protein        04/28/25 1429    04/28/25 1350  Adult diet Regular  Diet effective now        Question:  Diet type  Answer:  Regular    04/28/25 1350    04/27/25 1439  May Not Participate in Room Service  ( ROOM SERVICE MAY NOT PARTICIPATE)  Once        Question:  .  Answer:  Yes    04/27/25 1438                   Estimated Needs:   Estimated Energy Needs  Total Energy Estimated Needs in 24 hours (kCal):  (5848-9134)  Energy Estimated Needs per kg Body Weight in 24 hours (kCal/kg):  (25-30)  Method for Estimating Needs: Actual weight    Estimated Protein Needs  Total Protein Estimated Needs in 24 Hours (g):  (62-74)  Protein Estimated Needs per kg Body Weight in 24 Hours (g/kg):  (1-1.2)  Method for Estimating 24 Hour Protein Needs: Actual weight    Estimated Fluid Needs  Total Fluid Estimated Needs in 24 Hours (mL):  (4572-2156)  Method for Estimating 24 Hour Fluid Needs: 1 mL/kcal      Nutrition Diagnosis   Nutrition Diagnosis:  Malnutrition Diagnosis  Patient has Malnutrition Diagnosis: No    Nutrition Diagnosis  Patient has Nutrition Diagnosis: Yes  Diagnosis Status (1): New  Nutrition Diagnosis 1: Inadequate oral intake  Related to (1): decreased ability to  consume sufficient energy  As Evidenced by (1): NPO     Nutrition Interventions/Recommendations   Nutrition Interventions and Recommendations:  Nutrition Prescription: Nutrition prescription for oral nutrition    Nutrition Recommendations:  Individualized Nutrition Prescription Provided for : recommend oral diet with ensure plus high protein TID    Nutrition Interventions/Goals:   Food and/or Nutrient Delivery Interventions  Interventions: Meals and snacks, Medical food supplement  Meals and Snacks: General healthful diet  Goal: advance as able  Medical Food Supplement: Commercial beverage medical food supplement therapy  Goal: ensure plus high protein TID to provide 350 kcals and 20g protein each when diet resumes    Education Documentation  No documentation found.         Nutrition Monitoring and Evaluation   Monitoring/Evaluation:   Food/Nutrient Related History Monitoring  Monitoring and Evaluation Plan: Estimated Energy Intake  Estimated Energy Intake: Energy intake greater or equal to 75% of estimated energy needs    Anthropometric Measurements  Monitoring and Evaluation Plan: Body weight  Body Weight: Body weight - Maintain stable weight    Goal Status: New goal(s) identified    Follow Up  Time Spent (min): 30 minutes  Last Date of Nutrition Visit: 04/28/25  Nutrition Follow-Up Needed?: 5-7 days  Follow up Comment: 5/5/25          [1]   Past Medical History:  Diagnosis Date    Acute myocardial infarction 07/07/2023    Alcohol dependence 10/30/2023    Atrial fibrillation (Multi) 10/30/2023    Benign hypertensive heart disease without congestive heart failure 09/04/2023    Congestive heart failure 09/30/2023    Coronary arteriosclerosis in native artery 07/07/2023    Coronary artery disease     Essential hypertension 09/30/2023    High blood cholesterol     HTN (hypertension)     Hyperlipidemia 09/04/2023    Impaired perfusion of peripheral tissue 10/30/2023    MI (myocardial infarction) (Multi) 2010    stents  placed by Dr. Davila    MI (myocardial infarction) (Multi)     stents x2 by Dr. Salazar    Postsurgical percutaneous transluminal coronary angioplasty status 09/04/2023    Stenosis of coronary artery stent 07/07/2023    Stricture of artery (CMS-[facility]) 08/29/2023    Tobacco dependence 09/04/2023    VT (ventricular tachycardia) (Multi) 07/07/2023   [2]   Past Surgical History:  Procedure Laterality Date    CORONARY ANGIOPLASTY WITH STENT PLACEMENT      Dr Davila - 2 stents    CORONARY ANGIOPLASTY WITH STENT PLACEMENT      Dr Salazar - 2 stents    CORONARY ARTERY BYPASS GRAFT      MINI cabg x2    ORTHOPEDIC SURGERY      right ankle repair    ORTHOPEDIC SURGERY      right arm repair   [3] aspirin, 81 mg, orogastric tube, Daily  atorvastatin, 80 mg, orogastric tube, Nightly  cyanocobalamin, 100 mcg, oral, Daily  ezetimibe, 10 mg, orogastric tube, Daily  heparin (porcine), 5,000 Units, subcutaneous, q8h  insulin lispro, 0-5 Units, subcutaneous, q4h  [Held by provider] losartan, 100 mg, oral, Daily  metoprolol succinate XL, 50 mg, oral, Daily  oxygen, , inhalation, Continuous - Inhalation  psyllium, 1 packet, oral, Daily  ticagrelor, 90 mg, oral, BID    [4] D5 % and 0.9 % sodium chloride, 75 mL/hr, Last Rate: Stopped (04/28/25 1407)

## 2025-04-28 NOTE — SIGNIFICANT EVENT
RT extubated patient at 09:10 to 4L NC spo2 98%. RN Vielka Ortiz at bedside. Patient has clear diminished breath sounds, no stridor and voice is intact.

## 2025-04-28 NOTE — CARE PLAN
The patient's goals for the shift include      The clinical goals for the shift include neuro consult for further management of condition      Problem: Pain - Adult  Goal: Verbalizes/displays adequate comfort level or baseline comfort level  Outcome: Progressing     Problem: Safety - Adult  Goal: Free from fall injury  Outcome: Progressing     Problem: Discharge Planning  Goal: Discharge to home or other facility with appropriate resources  Outcome: Progressing     Problem: Chronic Conditions and Co-morbidities  Goal: Patient's chronic conditions and co-morbidity symptoms are monitored and maintained or improved  Outcome: Progressing     Problem: Nutrition  Goal: Nutrient intake appropriate for maintaining nutritional needs  Outcome: Progressing     Problem: Fall/Injury  Goal: Not fall by end of shift  Outcome: Progressing  Goal: Be free from injury by end of the shift  Outcome: Progressing  Goal: Verbalize understanding of personal risk factors for fall in the hospital  Outcome: Progressing  Goal: Verbalize understanding of risk factor reduction measures to prevent injury from fall in the home  Outcome: Progressing  Goal: Use assistive devices by end of the shift  Outcome: Progressing  Goal: Pace activities to prevent fatigue by end of the shift  Outcome: Progressing     Problem: Pain  Goal: Takes deep breaths with improved pain control throughout the shift  Outcome: Progressing  Goal: Turns in bed with improved pain control throughout the shift  Outcome: Progressing  Goal: Walks with improved pain control throughout the shift  Outcome: Progressing  Goal: Performs ADL's with improved pain control throughout shift  Outcome: Progressing  Goal: Participates in PT with improved pain control throughout the shift  Outcome: Progressing  Goal: Free from opioid side effects throughout the shift  Outcome: Progressing  Goal: Free from acute confusion related to pain meds throughout the shift  Outcome: Progressing     Problem:  Skin  Goal: Decreased wound size/increased tissue granulation at next dressing change  Outcome: Progressing  Flowsheets (Taken 4/28/2025 1009)  Decreased wound size/increased tissue granulation at next dressing change:   Utilize specialty bed per algorithm   Protective dressings over bony prominences  Goal: Participates in plan/prevention/treatment measures  Outcome: Progressing  Flowsheets (Taken 4/28/2025 1009)  Participates in plan/prevention/treatment measures:   Discuss with provider PT/OT consult   Elevate heels   Increase activity/out of bed for meals  Goal: Prevent/manage excess moisture  Outcome: Progressing  Flowsheets (Taken 4/28/2025 1009)  Prevent/manage excess moisture:   Cleanse incontinence/protect with barrier cream   Moisturize dry skin   Monitor for/manage infection if present   Follow provider orders for dressing changes  Goal: Prevent/minimize sheer/friction injuries  Outcome: Progressing  Flowsheets (Taken 4/28/2025 1009)  Prevent/minimize sheer/friction injuries:   Complete micro-shifts as needed if patient unable. Adjust patient position to relieve pressure points, not a full turn   Increase activity/out of bed for meals   Use pull sheet   Utilize specialty bed per algorithm   HOB 30 degrees or less  Goal: Promote/optimize nutrition  Outcome: Progressing  Flowsheets (Taken 4/28/2025 1009)  Promote/optimize nutrition:   Monitor/record intake including meals   Offer water/supplements/favorite foods   Discuss with provider if NPO > 2 days  Goal: Promote skin healing  Outcome: Progressing  Flowsheets (Taken 4/28/2025 1009)  Promote skin healing:   Turn/reposition every 2 hours/use positioning/transfer devices   Ensure correct size (line/device) and apply per  instructions   Protective dressings over bony prominences   Assess skin/pad under line(s)/device(s)

## 2025-04-28 NOTE — PROGRESS NOTES
Mateo Woodall is a 64 y.o. male on day 1 of admission presenting with AMS (altered mental status).    Patient was extubated this morning at 09:10am.  Patient lives with two friends. No use of assistive devices, no DME. He is independent with ADLs, iADLs, and daily tasks. He does not drive, states his daughter and sisters provide transportation. He no longer smoke tobacco, occasional alcohol use. PCP is Ernestina Jeffery CNP.   ADOD 05/02/2025  [facility] scores: not yet seen  Patient is uninsured.  Patient is anticipated to dc home with no needs, family will transport.     Randa Weber RN

## 2025-04-28 NOTE — PROGRESS NOTES
04/28/25 1258   [facility] Disability Status   Are you deaf or do you have serious difficulty hearing? N   Are you blind or do you have serious difficulty seeing, even when wearing glasses? N   Because of a physical, mental, or emotional condition, do you have serious difficulty concentrating, remembering, or making decisions? (5 years old or older) N   Do you have serious difficulty walking or climbing stairs? N   Do you have serious difficulty dressing or bathing? N   Because of a physical, mental, or emotional condition, do you have serious difficulty doing errands alone such as visiting the doctor? N

## 2025-04-29 ENCOUNTER — APPOINTMENT (OUTPATIENT)
Dept: CARDIOLOGY | Facility: HOSPITAL | Age: 65
End: 2025-04-29

## 2025-04-29 ENCOUNTER — APPOINTMENT (OUTPATIENT)
Dept: RADIOLOGY | Facility: HOSPITAL | Age: 65
End: 2025-04-29

## 2025-04-29 LAB
ALBUMIN SERPL BCP-MCNC: 3.1 G/DL (ref 3.4–5)
ALP SERPL-CCNC: 41 U/L (ref 33–136)
ALT SERPL W P-5'-P-CCNC: 8 U/L (ref 10–52)
ANION GAP SERPL CALCULATED.3IONS-SCNC: 9 MMOL/L (ref 10–20)
AORTIC VALVE MEAN GRADIENT: 2 MMHG
AORTIC VALVE PEAK VELOCITY: 1.06 M/S
AST SERPL W P-5'-P-CCNC: 15 U/L (ref 9–39)
AV PEAK GRADIENT: 4 MMHG
AVA (PEAK VEL): 2.15 CM2
AVA (VTI): 2.03 CM2
BASOPHILS # BLD AUTO: 0.05 X10*3/UL (ref 0–0.1)
BASOPHILS NFR BLD AUTO: 0.5 %
BILIRUB SERPL-MCNC: 0.4 MG/DL (ref 0–1.2)
BUN SERPL-MCNC: 11 MG/DL (ref 6–23)
CALCIUM SERPL-MCNC: 8.4 MG/DL (ref 8.6–10.3)
CHLORIDE SERPL-SCNC: 112 MMOL/L (ref 98–107)
CHOLEST SERPL-MCNC: 107 MG/DL (ref 0–199)
CHOLEST/HDLC SERPL: 2.4 {RATIO}
CO2 SERPL-SCNC: 25 MMOL/L (ref 21–32)
CREAT SERPL-MCNC: 0.67 MG/DL (ref 0.5–1.3)
EGFRCR SERPLBLD CKD-EPI 2021: >90 ML/MIN/1.73M*2
EJECTION FRACTION APICAL 4 CHAMBER: 50.3
EJECTION FRACTION: 43 %
EOSINOPHIL # BLD AUTO: 0.26 X10*3/UL (ref 0–0.7)
EOSINOPHIL NFR BLD AUTO: 2.6 %
ERYTHROCYTE [DISTWIDTH] IN BLOOD BY AUTOMATED COUNT: 12.8 % (ref 11.5–14.5)
EST. AVERAGE GLUCOSE BLD GHB EST-MCNC: 91 MG/DL
GLUCOSE BLD MANUAL STRIP-MCNC: 103 MG/DL (ref 74–99)
GLUCOSE BLD MANUAL STRIP-MCNC: 106 MG/DL (ref 74–99)
GLUCOSE BLD MANUAL STRIP-MCNC: 138 MG/DL (ref 74–99)
GLUCOSE SERPL-MCNC: 101 MG/DL (ref 74–99)
HBA1C MFR BLD: 4.8 % (ref ?–5.7)
HCT VFR BLD AUTO: 35.4 % (ref 41–52)
HDLC SERPL-MCNC: 43.7 MG/DL
HGB BLD-MCNC: 11.9 G/DL (ref 13.5–17.5)
IMM GRANULOCYTES # BLD AUTO: 0.06 X10*3/UL (ref 0–0.7)
IMM GRANULOCYTES NFR BLD AUTO: 0.6 % (ref 0–0.9)
LDLC SERPL CALC-MCNC: 46 MG/DL
LEFT ATRIUM VOLUME AREA LENGTH INDEX BSA: 11.9 ML/M2
LEFT VENTRICLE INTERNAL DIMENSION DIASTOLE: 5.66 CM (ref 3.5–6)
LEFT VENTRICULAR OUTFLOW TRACT DIAMETER: 2 CM
LV EJECTION FRACTION BIPLANE: 48 %
LYMPHOCYTES # BLD AUTO: 1.9 X10*3/UL (ref 1.2–4.8)
LYMPHOCYTES NFR BLD AUTO: 19 %
MAGNESIUM SERPL-MCNC: 2.05 MG/DL (ref 1.6–2.4)
MCH RBC QN AUTO: 34 PG (ref 26–34)
MCHC RBC AUTO-ENTMCNC: 33.6 G/DL (ref 32–36)
MCV RBC AUTO: 101 FL (ref 80–100)
MITRAL VALVE E/A RATIO: 0.76
MONOCYTES # BLD AUTO: 0.71 X10*3/UL (ref 0.1–1)
MONOCYTES NFR BLD AUTO: 7.1 %
NEUTROPHILS # BLD AUTO: 7.02 X10*3/UL (ref 1.2–7.7)
NEUTROPHILS NFR BLD AUTO: 70.2 %
NON HDL CHOLESTEROL: 63 MG/DL (ref 0–149)
NRBC BLD-RTO: 0 /100 WBCS (ref 0–0)
PHOSPHATE SERPL-MCNC: 3.7 MG/DL (ref 2.5–4.9)
PLATELET # BLD AUTO: 131 X10*3/UL (ref 150–450)
POTASSIUM SERPL-SCNC: 3.6 MMOL/L (ref 3.5–5.3)
PROT SERPL-MCNC: 5.6 G/DL (ref 6.4–8.2)
RBC # BLD AUTO: 3.5 X10*6/UL (ref 4.5–5.9)
RIGHT VENTRICLE FREE WALL PEAK S': 14.6 CM/S
RIGHT VENTRICLE PEAK SYSTOLIC PRESSURE: 23.1 MMHG
SODIUM SERPL-SCNC: 142 MMOL/L (ref 136–145)
TRICUSPID ANNULAR PLANE SYSTOLIC EXCURSION: 1.7 CM
TRIGL SERPL-MCNC: 89 MG/DL (ref 0–149)
VLDL: 18 MG/DL (ref 0–40)
WBC # BLD AUTO: 10 X10*3/UL (ref 4.4–11.3)

## 2025-04-29 PROCEDURE — 84100 ASSAY OF PHOSPHORUS: CPT

## 2025-04-29 PROCEDURE — 2500000001 HC RX 250 WO HCPCS SELF ADMINISTERED DRUGS (ALT 637 FOR MEDICARE OP): Performed by: INTERNAL MEDICINE

## 2025-04-29 PROCEDURE — 2500000001 HC RX 250 WO HCPCS SELF ADMINISTERED DRUGS (ALT 637 FOR MEDICARE OP)

## 2025-04-29 PROCEDURE — 97530 THERAPEUTIC ACTIVITIES: CPT | Mod: GP

## 2025-04-29 PROCEDURE — 2500000001 HC RX 250 WO HCPCS SELF ADMINISTERED DRUGS (ALT 637 FOR MEDICARE OP): Performed by: STUDENT IN AN ORGANIZED HEALTH CARE EDUCATION/TRAINING PROGRAM

## 2025-04-29 PROCEDURE — 2500000004 HC RX 250 GENERAL PHARMACY W/ HCPCS (ALT 636 FOR OP/ED): Mod: JZ

## 2025-04-29 PROCEDURE — 97162 PT EVAL MOD COMPLEX 30 MIN: CPT | Mod: GP

## 2025-04-29 PROCEDURE — 83036 HEMOGLOBIN GLYCOSYLATED A1C: CPT | Mod: WESLAB

## 2025-04-29 PROCEDURE — 82947 ASSAY GLUCOSE BLOOD QUANT: CPT

## 2025-04-29 PROCEDURE — 73030 X-RAY EXAM OF SHOULDER: CPT | Mod: RIGHT SIDE | Performed by: RADIOLOGY

## 2025-04-29 PROCEDURE — 2500000001 HC RX 250 WO HCPCS SELF ADMINISTERED DRUGS (ALT 637 FOR MEDICARE OP): Performed by: PSYCHIATRY & NEUROLOGY

## 2025-04-29 PROCEDURE — 99233 SBSQ HOSP IP/OBS HIGH 50: CPT | Performed by: INTERNAL MEDICINE

## 2025-04-29 PROCEDURE — 85025 COMPLETE CBC W/AUTO DIFF WBC: CPT

## 2025-04-29 PROCEDURE — 97116 GAIT TRAINING THERAPY: CPT | Mod: GP

## 2025-04-29 PROCEDURE — 97166 OT EVAL MOD COMPLEX 45 MIN: CPT | Mod: GO

## 2025-04-29 PROCEDURE — 73030 X-RAY EXAM OF SHOULDER: CPT | Mod: RT

## 2025-04-29 PROCEDURE — 2500000004 HC RX 250 GENERAL PHARMACY W/ HCPCS (ALT 636 FOR OP/ED): Mod: JW

## 2025-04-29 PROCEDURE — 83735 ASSAY OF MAGNESIUM: CPT

## 2025-04-29 PROCEDURE — 99233 SBSQ HOSP IP/OBS HIGH 50: CPT | Performed by: STUDENT IN AN ORGANIZED HEALTH CARE EDUCATION/TRAINING PROGRAM

## 2025-04-29 PROCEDURE — 93306 TTE W/DOPPLER COMPLETE: CPT | Performed by: INTERNAL MEDICINE

## 2025-04-29 PROCEDURE — 2060000001 HC INTERMEDIATE ICU ROOM DAILY

## 2025-04-29 PROCEDURE — 36415 COLL VENOUS BLD VENIPUNCTURE: CPT

## 2025-04-29 PROCEDURE — C8929 TTE W OR WO FOL WCON,DOPPLER: HCPCS

## 2025-04-29 PROCEDURE — 2500000002 HC RX 250 W HCPCS SELF ADMINISTERED DRUGS (ALT 637 FOR MEDICARE OP, ALT 636 FOR OP/ED)

## 2025-04-29 PROCEDURE — 80061 LIPID PANEL: CPT

## 2025-04-29 PROCEDURE — 97530 THERAPEUTIC ACTIVITIES: CPT | Mod: GO

## 2025-04-29 PROCEDURE — 80053 COMPREHEN METABOLIC PANEL: CPT

## 2025-04-29 PROCEDURE — 2500000005 HC RX 250 GENERAL PHARMACY W/O HCPCS: Performed by: STUDENT IN AN ORGANIZED HEALTH CARE EDUCATION/TRAINING PROGRAM

## 2025-04-29 RX ORDER — MULTIVIT-MIN/IRON FUM/FOLIC AC 7.5 MG-4
1 TABLET ORAL DAILY
Status: DISCONTINUED | OUTPATIENT
Start: 2025-04-29 | End: 2025-04-29

## 2025-04-29 RX ORDER — PHENOBARBITAL SODIUM 65 MG/ML
65 INJECTION, SOLUTION INTRAMUSCULAR; INTRAVENOUS EVERY 8 HOURS
Status: DISCONTINUED | OUTPATIENT
Start: 2025-04-29 | End: 2025-04-29

## 2025-04-29 RX ORDER — LOSARTAN POTASSIUM 25 MG/1
25 TABLET ORAL DAILY
Status: DISCONTINUED | OUTPATIENT
Start: 2025-04-29 | End: 2025-05-01 | Stop reason: HOSPADM

## 2025-04-29 RX ORDER — FOLIC ACID 1 MG/1
1 TABLET ORAL DAILY
Status: DISCONTINUED | OUTPATIENT
Start: 2025-04-29 | End: 2025-04-29

## 2025-04-29 RX ORDER — PHENOBARBITAL SODIUM 65 MG/ML
65 INJECTION, SOLUTION INTRAMUSCULAR; INTRAVENOUS EVERY 6 HOURS PRN
Status: DISCONTINUED | OUTPATIENT
Start: 2025-04-29 | End: 2025-04-29

## 2025-04-29 RX ORDER — CLOPIDOGREL BISULFATE 75 MG/1
75 TABLET ORAL DAILY
Status: DISCONTINUED | OUTPATIENT
Start: 2025-04-29 | End: 2025-05-01 | Stop reason: HOSPADM

## 2025-04-29 RX ORDER — PHENOBARBITAL SODIUM 65 MG/ML
32.5 INJECTION, SOLUTION INTRAMUSCULAR; INTRAVENOUS EVERY 8 HOURS
Status: DISCONTINUED | OUTPATIENT
Start: 2025-05-01 | End: 2025-04-29

## 2025-04-29 RX ORDER — METOPROLOL SUCCINATE 50 MG/1
50 TABLET, EXTENDED RELEASE ORAL ONCE
Status: COMPLETED | OUTPATIENT
Start: 2025-04-29 | End: 2025-04-29

## 2025-04-29 RX ORDER — METOPROLOL SUCCINATE 100 MG/1
100 TABLET, EXTENDED RELEASE ORAL DAILY
Status: DISCONTINUED | OUTPATIENT
Start: 2025-04-30 | End: 2025-05-01 | Stop reason: HOSPADM

## 2025-04-29 RX ADMIN — LOSARTAN POTASSIUM 25 MG: 25 TABLET, FILM COATED ORAL at 12:52

## 2025-04-29 RX ADMIN — Medication 21 PERCENT: at 22:50

## 2025-04-29 RX ADMIN — VITAM B12 100 MCG: 100 TAB at 08:58

## 2025-04-29 RX ADMIN — METOPROLOL SUCCINATE 50 MG: 50 TABLET, EXTENDED RELEASE ORAL at 08:58

## 2025-04-29 RX ADMIN — METOPROLOL SUCCINATE 50 MG: 50 TABLET, EXTENDED RELEASE ORAL at 10:16

## 2025-04-29 RX ADMIN — ASPIRIN 81 MG: 81 TABLET, CHEWABLE ORAL at 09:01

## 2025-04-29 RX ADMIN — ENOXAPARIN SODIUM 40 MG: 40 INJECTION SUBCUTANEOUS at 08:57

## 2025-04-29 RX ADMIN — ATORVASTATIN CALCIUM 80 MG: 80 TABLET, FILM COATED ORAL at 21:53

## 2025-04-29 RX ADMIN — ACETAMINOPHEN 975 MG: 325 TABLET ORAL at 16:56

## 2025-04-29 RX ADMIN — EZETIMIBE 10 MG: 10 TABLET ORAL at 09:00

## 2025-04-29 RX ADMIN — CLOPIDOGREL 75 MG: 75 TABLET ORAL at 08:58

## 2025-04-29 RX ADMIN — PERFLUTREN 3 ML OF DILUTION: 6.52 INJECTION, SUSPENSION INTRAVENOUS at 09:31

## 2025-04-29 RX ADMIN — METHOCARBAMOL 500 MG: 500 TABLET ORAL at 16:56

## 2025-04-29 RX ADMIN — PSYLLIUM HUSK 1 PACKET: 3.4 POWDER ORAL at 09:01

## 2025-04-29 ASSESSMENT — COGNITIVE AND FUNCTIONAL STATUS - GENERAL
MOVING FROM LYING ON BACK TO SITTING ON SIDE OF FLAT BED WITH BEDRAILS: A LITTLE
DRESSING REGULAR UPPER BODY CLOTHING: A LITTLE
TOILETING: A LOT
DRESSING REGULAR LOWER BODY CLOTHING: A LITTLE
MOBILITY SCORE: 14
MOBILITY SCORE: 21
CLIMB 3 TO 5 STEPS WITH RAILING: A LITTLE
WALKING IN HOSPITAL ROOM: A LITTLE
DAILY ACTIVITIY SCORE: 19
TURNING FROM BACK TO SIDE WHILE IN FLAT BAD: A LITTLE
STANDING UP FROM CHAIR USING ARMS: A LITTLE
TOILETING: A LITTLE
PERSONAL GROOMING: A LITTLE
PERSONAL GROOMING: A LITTLE
DAILY ACTIVITIY SCORE: 15
DRESSING REGULAR LOWER BODY CLOTHING: A LOT
EATING MEALS: A LITTLE
CLIMB 3 TO 5 STEPS WITH RAILING: A LOT
HELP NEEDED FOR BATHING: A LITTLE
DRESSING REGULAR UPPER BODY CLOTHING: A LITTLE
MOVING TO AND FROM BED TO CHAIR: A LOT
STANDING UP FROM CHAIR USING ARMS: A LOT
HELP NEEDED FOR BATHING: A LOT
WALKING IN HOSPITAL ROOM: A LOT

## 2025-04-29 ASSESSMENT — PAIN - FUNCTIONAL ASSESSMENT
PAIN_FUNCTIONAL_ASSESSMENT: FLACC (FACE, LEGS, ACTIVITY, CRY, CONSOLABILITY)
PAIN_FUNCTIONAL_ASSESSMENT: FLACC (FACE, LEGS, ACTIVITY, CRY, CONSOLABILITY)
PAIN_FUNCTIONAL_ASSESSMENT: 0-10

## 2025-04-29 ASSESSMENT — PAIN SCALES - GENERAL
PAINLEVEL_OUTOF10: 0 - NO PAIN
PAINLEVEL_OUTOF10: 3
PAINLEVEL_OUTOF10: 0 - NO PAIN

## 2025-04-29 ASSESSMENT — PAIN SCALES - WONG BAKER: WONGBAKER_NUMERICALRESPONSE: NO HURT

## 2025-04-29 ASSESSMENT — ACTIVITIES OF DAILY LIVING (ADL)
ADL_ASSISTANCE: INDEPENDENT
ADL_ASSISTANCE: INDEPENDENT
BATHING_ASSISTANCE: MODERATE
ADLS_ADDRESSED: NO

## 2025-04-29 NOTE — PROGRESS NOTES
Patient not medically clear. [facility] met with patient and his daughter regarding his no insurance. Patient verified that he does not have health insurance. [facility] messaged Kim Salmon to determine if he will qualify for Medicaid. At this time there is not a safe discharge plan in place. Will follow.      04/29/25 3684   Discharge Planning   Home or Post Acute Services Other (Comment)   Expected Discharge Disposition [place]  (TBD)   Does the patient need discharge transport arranged? No   RoundTrip coordination needed? No

## 2025-04-29 NOTE — PROGRESS NOTES
"Mateo Woodall is a 64 y.o. male on day 2 of admission presenting with AMS (altered mental status).    Subjective   Patient extubated yesterday and states he feels much better.  Vision still blurry but seems to be slowly improving.       Objective     Physical Exam  Eyes:      Conjunctiva/sclera: Conjunctivae normal.   Cardiovascular:      Rate and Rhythm: Normal rate and regular rhythm.      Heart sounds:      No gallop.   Pulmonary:      Breath sounds: Normal breath sounds. No wheezing, rhonchi or rales.   Abdominal:      Palpations: Abdomen is soft.   Neurological:      General: No focal deficit present.      Mental Status: He is alert.       Constitutional:       Appearance: Not in distress.   Eyes:      Conjunctiva/sclera: Conjunctivae normal.   Neck:      Vascular: JVD normal.   Pulmonary:      Breath sounds: Normal breath sounds. No wheezing. No rhonchi. No rales.   Cardiovascular:      Normal rate. Regular rhythm.      Murmurs: There is no murmur.      No gallop.  No click. No rub.   Abdominal:      Palpations: Abdomen is soft.   Neurological:      General: No focal deficit present.      Mental Status: Alert.        Last Recorded Vitals  Blood pressure 130/81, pulse 99, temperature 36.8 °C (98.2 °F), temperature source Oral, resp. rate 17, height 1.702 m (5' 7\"), weight 62.1 kg (136 lb 14.5 oz), SpO2 97%.  Intake/Output last 3 Shifts:  I/O last 3 completed shifts:  In: 2095.4 (33.7 mL/kg) [P.O.:200; I.V.:1795.4 (28.9 mL/kg); NG/GT:100]  Out: 200 (3.2 mL/kg) [Urine:200 (0.1 mL/kg/hr)]  Weight: 62.1 kg     Relevant Results                 Results for orders placed or performed during the hospital encounter of 04/27/25 (from the past 24 hours)   POCT GLUCOSE   Result Value Ref Range    POCT Glucose 207 (H) 74 - 99 mg/dL   POCT GLUCOSE   Result Value Ref Range    POCT Glucose 109 (H) 74 - 99 mg/dL   Comprehensive Metabolic Panel   Result Value Ref Range    Glucose 101 (H) 74 - 99 mg/dL    Sodium 142 136 - 145 " mmol/L    Potassium 3.6 3.5 - 5.3 mmol/L    Chloride 112 (H) 98 - 107 mmol/L    Bicarbonate 25 21 - 32 mmol/L    Anion Gap 9 (L) 10 - 20 mmol/L    Urea Nitrogen 11 6 - 23 mg/dL    Creatinine 0.67 0.50 - 1.30 mg/dL    eGFR >90 >60 mL/min/1.73m*2    Calcium 8.4 (L) 8.6 - 10.3 mg/dL    Albumin 3.1 (L) 3.4 - 5.0 g/dL    Alkaline Phosphatase 41 33 - 136 U/L    Total Protein 5.6 (L) 6.4 - 8.2 g/dL    AST 15 9 - 39 U/L    Bilirubin, Total 0.4 0.0 - 1.2 mg/dL    ALT 8 (L) 10 - 52 U/L   Magnesium   Result Value Ref Range    Magnesium 2.05 1.60 - 2.40 mg/dL   Phosphorus   Result Value Ref Range    Phosphorus 3.7 2.5 - 4.9 mg/dL   Lipid panel   Result Value Ref Range    Cholesterol 107 0 - 199 mg/dL    HDL-Cholesterol 43.7 mg/dL    Cholesterol/HDL Ratio 2.4     LDL Calculated 46 <=99 mg/dL    VLDL 18 0 - 40 mg/dL    Triglycerides 89 0 - 149 mg/dL    Non HDL Cholesterol 63 0 - 149 mg/dL   CBC and Auto Differential   Result Value Ref Range    WBC 10.0 4.4 - 11.3 x10*3/uL    nRBC 0.0 0.0 - 0.0 /100 WBCs    RBC 3.50 (L) 4.50 - 5.90 x10*6/uL    Hemoglobin 11.9 (L) 13.5 - 17.5 g/dL    Hematocrit 35.4 (L) 41.0 - 52.0 %     (H) 80 - 100 fL    MCH 34.0 26.0 - 34.0 pg    MCHC 33.6 32.0 - 36.0 g/dL    RDW 12.8 11.5 - 14.5 %    Platelets 131 (L) 150 - 450 x10*3/uL    Neutrophils % 70.2 40.0 - 80.0 %    Immature Granulocytes %, Automated 0.6 0.0 - 0.9 %    Lymphocytes % 19.0 13.0 - 44.0 %    Monocytes % 7.1 2.0 - 10.0 %    Eosinophils % 2.6 0.0 - 6.0 %    Basophils % 0.5 0.0 - 2.0 %    Neutrophils Absolute 7.02 1.20 - 7.70 x10*3/uL    Immature Granulocytes Absolute, Automated 0.06 0.00 - 0.70 x10*3/uL    Lymphocytes Absolute 1.90 1.20 - 4.80 x10*3/uL    Monocytes Absolute 0.71 0.10 - 1.00 x10*3/uL    Eosinophils Absolute 0.26 0.00 - 0.70 x10*3/uL    Basophils Absolute 0.05 0.00 - 0.10 x10*3/uL   POCT GLUCOSE   Result Value Ref Range    POCT Glucose 103 (H) 74 - 99 mg/dL   Transthoracic Echo Complete   Result Value Ref Range    BSA  1.71 m2   POCT GLUCOSE   Result Value Ref Range    POCT Glucose 138 (H) 74 - 99 mg/dL                 Assessment & Plan  AMS (altered mental status)    CVA  Acute respiratory failure  Atherosclerotic heart disease status post CABG 9/23  Atrial fibrillation, paroxysmal  Chronic systolic heart failure  Primary hypertension  Hyperlipidemia    4/29: Extubated yesterday and clinically doing well.  Will having visual issues likely associated with the acute stroke.  They seem to be improving to a small degree at this point.  Cardiac wise appears to be well compensated.  Blood pressure now has increased to the point where we could restart beta-blocker and ARB therapies.  Patient was on dual antiplatelet therapy with aspirin and Brilinta, but there were issues with cost of the Brilinta therapy.  I believe monotherapy with clopidogrel would be reasonable at this point in time given bypass surgery was in September 2023.  There is documented history of paroxysmal atrial fibrillation.  In that case the coagulation therapy would be recommended.  Cost of direct oral anticoagulants may also be an issue and problem as well.  Will need to investigate further.  Patient was also on atorvastatin 80 mg at home with Zetia 10 mg which I believe can safely be restarted at this point in time.  Complicated case but patient is making improvements.        I spent 35 minutes in the professional and overall care of this patient.      Sunil Duron, DO     [facility]  - Disaster Emergency

## 2025-04-29 NOTE — CARE PLAN
Problem: Pain - Adult  Goal: Verbalizes/displays adequate comfort level or baseline comfort level  Outcome: Progressing     Problem: Safety - Adult  Goal: Free from fall injury  Outcome: Progressing     Problem: Discharge Planning  Goal: Discharge to home or other facility with appropriate resources  Outcome: Progressing     Problem: Chronic Conditions and Co-morbidities  Goal: Patient's chronic conditions and co-morbidity symptoms are monitored and maintained or improved  Outcome: Progressing     Problem: Nutrition  Goal: Nutrient intake appropriate for maintaining nutritional needs  Outcome: Progressing     Problem: Fall/Injury  Goal: Not fall by end of shift  Outcome: Progressing  Goal: Be free from injury by end of the shift  Outcome: Progressing  Goal: Verbalize understanding of personal risk factors for fall in the hospital  Outcome: Progressing  Goal: Verbalize understanding of risk factor reduction measures to prevent injury from fall in the home  Outcome: Progressing  Goal: Use assistive devices by end of the shift  Outcome: Progressing  Goal: Pace activities to prevent fatigue by end of the shift  Outcome: Progressing     Problem: Pain  Goal: Takes deep breaths with improved pain control throughout the shift  Outcome: Progressing  Goal: Turns in bed with improved pain control throughout the shift  Outcome: Progressing  Goal: Walks with improved pain control throughout the shift  Outcome: Progressing  Goal: Performs ADL's with improved pain control throughout shift  Outcome: Progressing  Goal: Participates in PT with improved pain control throughout the shift  Outcome: Progressing  Goal: Free from opioid side effects throughout the shift  Outcome: Progressing  Goal: Free from acute confusion related to pain meds throughout the shift  Outcome: Progressing     Problem: Skin  Goal: Decreased wound size/increased tissue granulation at next dressing change  Outcome: Progressing  Flowsheets (Taken 4/28/2025  1009 by Vielka Ortiz RN)  Decreased wound size/increased tissue granulation at next dressing change:   Utilize specialty bed per algorithm   Protective dressings over bony prominences  Goal: Participates in plan/prevention/treatment measures  Outcome: Progressing  Flowsheets (Taken 4/28/2025 1009 by Vielka Ortiz RN)  Participates in plan/prevention/treatment measures:   Discuss with provider PT/OT consult   Elevate heels   Increase activity/out of bed for meals  Goal: Prevent/manage excess moisture  Outcome: Progressing  Flowsheets (Taken 4/29/2025 1228)  Prevent/manage excess moisture: Cleanse incontinence/protect with barrier cream  Goal: Prevent/minimize sheer/friction injuries  Outcome: Progressing  Flowsheets (Taken 4/29/2025 1228)  Prevent/minimize sheer/friction injuries:   Turn/reposition every 2 hours/use positioning/transfer devices   HOB 30 degrees or less   Use pull sheet   Increase activity/out of bed for meals  Goal: Promote/optimize nutrition  Outcome: Progressing  Flowsheets (Taken 4/29/2025 1228)  Promote/optimize nutrition: Monitor/record intake including meals  Goal: Promote skin healing  Outcome: Progressing  Flowsheets (Taken 4/28/2025 1009 by Vielka Ortiz RN)  Promote skin healing:   Turn/reposition every 2 hours/use positioning/transfer devices   Ensure correct size (line/device) and apply per  instructions   Protective dressings over bony prominences   Assess skin/pad under line(s)/device(s)   The patient's goals for the shift include      The clinical goals for the shift include remain free of falls    Over the shift, the patient did  make progress toward the following goals. Barriers to progression include pt intermittent confusion. Recommendations to address these barriers include frequent rounding making sure pt remains oriented providing adequate rest periodws  .

## 2025-04-29 NOTE — PROGRESS NOTES
Physical Therapy    Physical Therapy Evaluation & Treatment    Patient Name: Mateo Woodall  MRN: [id number]  Department: [address]  Room: 17/17  Today's Date: 4/29/2025   Time Calculation  Start Time: 0940  Stop Time: 1011  Time Calculation (min): 31 min    Assessment/Plan   PT Assessment  PT Assessment Results: Decreased endurance, Impaired balance, Decreased mobility, Decreased coordination, Impaired judgement, Decreased safety awareness, Impaired vision  Rehab Prognosis: Good  Barriers to Discharge Home: Caregiver assistance, Physical needs  Caregiver Assistance: Caregiver assistance needed per identified barriers - however, level of patient's required assistance exceeds assistance available at home  Physical Needs: Ambulating household distances limited by function/safety, 24hr mobility assistance needed, Intermittent ADL assistance needed, High falls risk due to function or environment  Evaluation/Treatment Tolerance: Patient limited by fatigue (and dizziness)  Medical Staff Made Aware: Yes  Strengths: Premorbid level of function  Barriers to Participation: Comorbidities, Insurance  End of Session Communication: Bedside nurse  Assessment Comment: pt demonstrates a decline in function from his baseline, MRI brain is positive for acute infarcts. pt with deficits in balance, coordination, vision, safety awareness, and tolerance to activity. pt currently requires max A for balance during attempts at ambulation and will benefit form continued skilled physical therapy services to improve his functional performance and maximzie safety. Recommend high intensity level of rehabilitation when discharged.  End of Session Patient Position: Up in chair, Alarm on (needs in reach)   IP OR SWING BED PT PLAN  Inpatient or Swing Bed: Inpatient  PT Plan  Treatment/Interventions: Bed mobility, Transfer training, Gait training, Balance training, Neuromuscular re-education, Endurance training, Therapeutic exercise, Therapeutic  "activity  PT Plan: Ongoing PT  PT Frequency: 5 times per week  PT Discharge Recommendations: High intensity level of continued care  Equipment Recommended upon Discharge: Wheeled walker  PT Recommended Transfer Status: Assist x1, Assistive device  PT - OK to Discharge: Yes      Subjective     General Visit Information:  General  Reason for Referral: Impaired Mobility  Referred By: Jaciel Kline MD  Past Medical History Relevant to Rehab: HLD, HTN, CAD, CHF, a-fibCHF, PTCA, cardiac stent, MI, alcohol dependence, rib fractures  Family/Caregiver Present: Yes  Caregiver Feedback: daughter arrived during therapy session  Co-Treatment: OT  Co-Treatment Reason: patient/caregiver safety and optimization of patient outcomes for a medically complex initial evaluation in ICU  Prior to Session Communication: Bedside nurse, Physician  Patient Position Received: Bed, 3 rail up, Alarm off, caregiver present  Preferred Learning Style: verbal  General Comment: Pt is a 65 yo male admitted to the hospital with AMS after being found unresponsive at home. Pt has been complaining of worsening vision, so a MR brain was ordered, which revealed, \"acute bilateral infarcts involving ventral and  medial portions of bilateral thalami, and extending down into the  subthalamic regions to involve the lateral walls of the 3rd ventricle.\"  Home Living:  Home Living  Type of Home: House  Lives With: Friends  Home Adaptive Equipment: Walker rolling or standard, Cane, Wheelchair-manual, Crutches  Home Layout: One level  Home Access: Level entry  Bathroom Shower/Tub: Tub/shower unit  Bathroom Toilet: Standard  Bathroom Equipment: Grab bars in shower, Shower chair with back  Prior Level of Function:  Prior Function Per Pt/Caregiver Report  Level of [place]: Independent with ADLs and functional transfers, Independent with homemaking with ambulation  ADL Assistance: Independent  Homemaking Assistance: Independent  Ambulatory Assistance: " Independent  Vocational: Retired  Hand Dominance: Right  Prior Function Comments: Pt was active and driving PTA.  Precautions:  Precautions  Hearing/Visual Limitations: hearing appears WFL, usually wears glasses. reports blurry vision, difficulty focusing, issues with depth perception, intermittent double vision (above and below)  Medical Precautions: Fall precautions      Vital Signs Comment: PRE PT 83 bpm, 94%, 31 breaths/min, 151/97 (114) mmHg. POST  bpm, 96%, 18 breaths/min, 147/116 (128) mmHg    Objective   Pain:  Pain Assessment  Pain Assessment: FLACC (Face, Legs, Activity, Cry, Consolability)  0-10 (Numeric) Pain Score: 3  Pain Type: Chronic pain  Pain Location: Shoulder  Pain Orientation: Right (when raising arm more than lowering arm (xray indicates no acute abnormality))  Patient's Stated Pain Goal: No pain  Pain Interventions: Repositioned  Response to Interventions: Content/relaxed  Cognition:  Cognition  Overall Cognitive Status: Within Functional Limits  Orientation Level: Oriented X4  Cognition Comments: pleasant and cooperative    General Assessments:  General Observation  General Observation: visible skin intact. poor ability to keep eyes focused, poor tracking especially upper right quadrant and all lower quadrants, B eyes seem to move only horizontally.    Activity Tolerance  Endurance: Decreased tolerance for upright activites  Activity Tolerance Comments: fair- due to dizziness when attempting to ambulate  Rate of Perceived Exertion (RPE): 4    Sensation  Proprioception:  (difficulty with trunk and head position in space when standing due to dizziness)  Sensation Comment: denies paresthesias    Strength  Strength Comments: B LE 5/5 per manual muscle testing  Strength  Strength Comments: B LE 5/5 per manual muscle testing    Perception  Inattention/Neglect:  (pt with difficulty focusing on a single object, more on the right side than the left side.)      Coordination  Coordination Comment:  finger to nose with outstretched arms at side WFL B, poor body coordination in standing.    Postural Control  Postural Control: Within Functional Limits    Static Sitting Balance  Static Sitting-Balance Support: Bilateral upper extremity supported, Feet supported  Static Sitting-Level of Assistance: Distant supervision  Static Sitting-Comment/Number of Minutes: good sitting in bedside chair  Dynamic Sitting Balance  Dynamic Sitting-Balance Support: Bilateral upper extremity supported, Feet supported  Dynamic Sitting-Level of Assistance: Contact guard  Dynamic Sitting-Comments: fair+/good- balance seated on EOB    Static Standing Balance  Static Standing-Balance Support: Bilateral upper extremity supported  Static Standing-Level of Assistance: Moderate assistance  Static Standing-Comment/Number of Minutes: fair-, arm-in-arm assist for static stance, upper body unable to maintain static position  Dynamic Standing Balance  Dynamic Standing-Balance Support: Bilateral upper extremity supported  Dynamic Standing-Level of Assistance: Maximum assistance  Dynamic Standing-Comments: poor balance with attempts at ambulation, worse with stepping backward  Functional Assessments:  ADL  ADL's Addressed: No    Bed Mobility  Bed Mobility: Yes  Bed Mobility 1  Bed Mobility 1: Supine to sitting  Level of Assistance 1: Minimum assistance  Bed Mobility Comments 1: pt able to move B LEs off EOB, min A for balance to complete guiding trunk up    Transfers  Transfer: Yes  Transfer 1  Transfer From 1: Bed to  Transfer to 1: Chair with arms  Technique 1: Sit to stand, Stand to sit  Transfer Level of Assistance 1: Moderate assistance, +2  Trials/Comments 1: arm-in-arm assist, slow to compelte trnasfer, very unsteady, decreased eccentric control in sitting, difficulty finding arms of chair priro to sitting.  Transfers 2  Transfer From 2: Chair with arms to  Transfer to 2: Stand  Technique 2: Sit to stand  Transfer Level of Assistance 2:  Minimum assistance  Trials/Comments 2: min A for balance upon initial stand, wide SAMARA.  Transfers 3  Transfer From 3: Stand to  Transfer to 3: Chair with arms  Technique 3: Stand to sit  Transfer Level of Assistance 3: Minimum assistance  Trials/Comments 3: improved ability to find arms of chair, min A for eccentirc contorl in sitting, wide SAMARA.    Ambulation/Gait Training  Ambulation/Gait Training Performed: Yes  Ambulation/Gait Training 1  Surface 1: Level tile  Device 1: No device  Assistance 1: Maximum assistance  Quality of Gait 1: Wide base of support, Diminished heel strike, Inconsistent stride length  Comments/Distance (ft) 1: Amb 3 steps forward, very wide SAMARA, poor heel strike/toe off, varying step lengths. c/o dizziness, closed eyes, dizziness did not change. Amb 3 steps backwards to chair, taking larger steps with left foot compared to right, unsteady.  Extremity/Trunk Assessments:  RLE   RLE : Within Functional Limits  LLE   LLE : Within Functional Limits  Treatments:  Therapeutic Activity  Therapeutic Activity Performed:  (see balance assessment, bed mobility, transfers, and gait training for full details. pt instructed in balance and safety techniques related to visual issues and dizziness.)    Other Activity  Other Activity Performed:  (Educated on strategies to overcome visual disturbances, focusing on one object through multiple fields of vision.)  Outcome Measures:  [facility] Basic Mobility  Turning from your back to your side while in a flat bed without using bedrails: A little  Moving from lying on your back to sitting on the side of a flat bed without using bedrails: A little  Moving to and from bed to chair (including a wheelchair): A lot  Standing up from a chair using your arms (e.g. wheelchair or bedside chair): A lot  To walk in hospital room: A lot  Climbing 3-5 steps with railing: A lot  Basic Mobility - Total Score: 14    FSS-ICU  Ambulation: Walks <50 feet with any assistance x1 or walks  any distance with assistance x2 people  Rolling: Minimal assistance (performs 75% or more of task)  Sitting: Minimal assistance (performs 75% or more of task)  Transfer Sit-to-Stand: Moderate assistance (performs 50 - 74% of task)  Transfer Supine-to-Sit: Minimal assistance (performs 75% or more of task)  Total Score: 16    Encounter Problems       Encounter Problems (Active)       PT STG Problem       Patient will roll right and left with independent assist to facilitate mobility. (Progressing)       Start:  04/29/25    Expected End:  05/15/25            Patient will transfer supine to sit and sit to supine with independent assist to facilitate mobility. (Progressing)       Start:  04/29/25    Expected End:  05/15/25            Patient will transfer sit to stand and stand to sit with independent assist to facilitate mobility. (Progressing)       Start:  04/29/25    Expected End:  05/15/25            Patient will transfer bed to chair and chair to bed with independent assist to facilitate mobility. (Progressing)       Start:  04/29/25    Expected End:  05/15/25            Patient will amb 150 feet rolling walker device including two turns on even surface with independent assist to facilitate safe mobility.   (Progressing)       Start:  04/29/25    Expected End:  05/15/25                   Education Documentation  Mobility Training, taught by Zainab Wright PT at 4/29/2025  6:47 PM.  Learner: Patient  Readiness: Acceptance  Method: Explanation  Response: Verbalizes Understanding    Education Comments  04/29/25 [id number] Zainab Wright PT  Educated on PT POC

## 2025-04-29 NOTE — PROGRESS NOTES
Critical Care Medicine Progress Note/Transfer Note    Admitted on:     4/27/2025  Length of Stay: 2 day(s)     Interval History     Mateo Woodall is a 64 y.o. male with h/o DLP, HTN, CAD, CHF, Afib who was brought in to the ED after being found unresponsive this am. No fall or trauma. Of note had bleeding from his R ear last week. In the ED CT head did not show evidence of acute bleed or mass effect. CT angio H&N showed potential thrombosis of the P1 segment of the R PCA. Tele-Stroke was consulted who did not feel TNK was warranted, as unclear last known well time. ICU at  was then called and accepted to the ICU. Of note was intubated in the ED for respiratory failure and airway protection. He remained hemodynamically stable until his arrival.     4/28/25: Patient was successfully extubated in the am to nasal canula. Has some residual vision defects. MRI brain done which confirms Recent small nonhemorrhagic infarcts involving bilateral paramedian thalami and right midbrain. 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 Neurology is following. Patient restarted on dual antiplatelets and B blocker.    4/29/25: Uneventful night, improving vision. No obvious motor deficits, tolerating a diet    Objective   Objective     Vitals:    04/29/25 0801   Weight: 62.1 kg (136 lb 14.5 oz)   Body mass index is 21.44 kg/m².        4/29/2025     4:00 AM 4/29/2025     5:00 AM 4/29/2025     6:00 AM 4/29/2025     7:00 AM 4/29/2025     8:00 AM 4/29/2025     8:01 AM 4/29/2025     9:00 AM   Vitals   Systolic 136 110 139 155 156  151   Diastolic 84 45 54 70 83  97   BP Location Left arm         Heart Rate 63 59 78 60 73  72   Temp 37.1 °C (98.8 °F)   36.8 °C (98.2 °F)      Resp 15 14 20 15 17  20   Weight (lb) 136.91     136.91    BMI 21.44 kg/m2     21.44 kg/m2     BSA (m2) 1.71 m2     1.71 m2         Vent settings:       Intake/Output Summary (Last 24 hours) at 4/29/2025 0936  Last data filed at 4/29/2025 0400  Gross per 24 hour   Intake 760.71 ml   Output 100 ml   Net 660.71 ml       Physical Exam  ----------------  Patient awake and alert post extubation  Partial vertical vision defects reported  Moist MM  PERRL  Air entry bilaterally equal, S1 S2 wnl  Abdomen soft and non tender  Extremities no edema    Medications     Scheduled Medications:   Scheduled Medications[1]   Continuous Medications:   Continuous Medications[2]   PRN Medications:     Labs     Results from last 72 hours   Lab Units 04/29/25  0529 04/28/25  0509 04/27/25  1027   GLUCOSE mg/dL 101* 112* 102*   SODIUM mmol/L 142 143 139   POTASSIUM mmol/L 3.6 3.5 3.9   CHLORIDE mmol/L 112* 111* 106   CO2 mmol/L 25 27 26   BUN mg/dL 11 10 12   CREATININE mg/dL 0.67 0.92 0.84   EGFR mL/min/1.73m*2 >90 >90 >90   CALCIUM mg/dL 8.4* 8.2* 8.9   ALBUMIN g/dL 3.1* 3.1* 3.9   MAGNESIUM mg/dL 2.05 1.84 1.99   PHOSPHORUS mg/dL 3.7 3.9  --      Results from last 72 hours   Lab Units 04/29/25  0529 04/28/25  0509 04/27/25  1027   ALK PHOS U/L 41 43 51   ALT U/L 8* 8* 9*   AST U/L 15 14 16   BILIRUBIN TOTAL mg/dL 0.4 0.3 0.8   PROTEIN TOTAL g/dL 5.6* 5.8* 6.7     Results from last 72 hours   Lab Units 04/27/25  1121 04/27/25  1027   TROPHS ng/L 11 12     Results from last 72 hours   Lab Units 04/27/25  1027   LACTATE mmol/L 1.2     Results from last 72 hours   Lab Units 04/29/25  0530 04/28/25  0509 04/27/25  1027   WBC AUTO x10*3/uL 10.0 10.0 9.1   NRBC AUTO /100 WBCs 0.0 0.0 0.0   RBC AUTO x10*6/uL 3.50* 3.52* 4.13*   HEMOGLOBIN g/dL 11.9* 11.9* 13.5   HEMATOCRIT % 35.4* 35.5* 40.7*   MCV fL 101* 101* 99   MCH pg 34.0 33.8 32.7   MCHC g/dL 33.6 33.5 33.2   RDW % 12.8 13.2 12.9   PLATELETS AUTO x10*3/uL 131* 137* 163     Results from last 72 hours   Lab Units 04/28/25  0835 04/28/25  0527 04/27/25  1647 04/27/25  1235   POCT  "PH, ARTERIAL pH  --  7.36* 7.41 7.40   POCT PCO2, ARTERIAL mm Hg  --  51* 45* 45*   POCT PO2, ARTERIAL mm Hg  --  127* 146* 165*   POCT HCO3 CALCULATED, ARTERIAL mmol/L  --  28.8* 28.5* 27.9*   POCT LACTATE, ARTERIAL mmol/L  --  0.5 0.6 1.1   POCT BASE EXCESS, ARTERIAL mmol/L  --  2.5 3.2* 2.5   FIO2 % 40 40 40 40     No results found for: \"BLOODCULT\", \"GRAMSTAIN\"  No results found for: \"URINECULTURE\"    Imaging and Diagnostic Studies     Lab/Radiology/Diagnostic Review:  Results for orders placed or performed during the hospital encounter of 04/27/25 (from the past 24 hours)   POCT GLUCOSE   Result Value Ref Range    POCT Glucose 131 (H) 74 - 99 mg/dL   POCT GLUCOSE   Result Value Ref Range    POCT Glucose 207 (H) 74 - 99 mg/dL   POCT GLUCOSE   Result Value Ref Range    POCT Glucose 109 (H) 74 - 99 mg/dL   Comprehensive Metabolic Panel   Result Value Ref Range    Glucose 101 (H) 74 - 99 mg/dL    Sodium 142 136 - 145 mmol/L    Potassium 3.6 3.5 - 5.3 mmol/L    Chloride 112 (H) 98 - 107 mmol/L    Bicarbonate 25 21 - 32 mmol/L    Anion Gap 9 (L) 10 - 20 mmol/L    Urea Nitrogen 11 6 - 23 mg/dL    Creatinine 0.67 0.50 - 1.30 mg/dL    eGFR >90 >60 mL/min/1.73m*2    Calcium 8.4 (L) 8.6 - 10.3 mg/dL    Albumin 3.1 (L) 3.4 - 5.0 g/dL    Alkaline Phosphatase 41 33 - 136 U/L    Total Protein 5.6 (L) 6.4 - 8.2 g/dL    AST 15 9 - 39 U/L    Bilirubin, Total 0.4 0.0 - 1.2 mg/dL    ALT 8 (L) 10 - 52 U/L   Magnesium   Result Value Ref Range    Magnesium 2.05 1.60 - 2.40 mg/dL   Phosphorus   Result Value Ref Range    Phosphorus 3.7 2.5 - 4.9 mg/dL   Lipid panel   Result Value Ref Range    Cholesterol 107 0 - 199 mg/dL    HDL-Cholesterol 43.7 mg/dL    Cholesterol/HDL Ratio 2.4     LDL Calculated 46 <=99 mg/dL    VLDL 18 0 - 40 mg/dL    Triglycerides 89 0 - 149 mg/dL    Non HDL Cholesterol 63 0 - 149 mg/dL   CBC and Auto Differential   Result Value Ref Range    WBC 10.0 4.4 - 11.3 x10*3/uL    nRBC 0.0 0.0 - 0.0 /100 WBCs    RBC 3.50 " (L) 4.50 - 5.90 x10*6/uL    Hemoglobin 11.9 (L) 13.5 - 17.5 g/dL    Hematocrit 35.4 (L) 41.0 - 52.0 %     (H) 80 - 100 fL    MCH 34.0 26.0 - 34.0 pg    MCHC 33.6 32.0 - 36.0 g/dL    RDW 12.8 11.5 - 14.5 %    Platelets 131 (L) 150 - 450 x10*3/uL    Neutrophils % 70.2 40.0 - 80.0 %    Immature Granulocytes %, Automated 0.6 0.0 - 0.9 %    Lymphocytes % 19.0 13.0 - 44.0 %    Monocytes % 7.1 2.0 - 10.0 %    Eosinophils % 2.6 0.0 - 6.0 %    Basophils % 0.5 0.0 - 2.0 %    Neutrophils Absolute 7.02 1.20 - 7.70 x10*3/uL    Immature Granulocytes Absolute, Automated 0.06 0.00 - 0.70 x10*3/uL    Lymphocytes Absolute 1.90 1.20 - 4.80 x10*3/uL    Monocytes Absolute 0.71 0.10 - 1.00 x10*3/uL    Eosinophils Absolute 0.26 0.00 - 0.70 x10*3/uL    Basophils Absolute 0.05 0.00 - 0.10 x10*3/uL   POCT GLUCOSE   Result Value Ref Range    POCT Glucose 103 (H) 74 - 99 mg/dL   Transthoracic Echo Complete   Result Value Ref Range    BSA 1.71 m2     Imaging  XR shoulder right 2+ views  Result Date: 4/29/2025  No acute pathologic findings are identified.   MACRO: none   Signed by: Richard Juarez 4/29/2025 8:14 AM Dictation workstation:   [id number]    MR cervical spine wo IV contrast  Result Date: 4/28/2025  1. No evidence of acute abnormality. 2. Developmental central canal stenosis. 3. Multilevel degenerative cervical spondylosis as described above.   MACRO: None   Signed by: Sunil Queen 4/28/2025 7:35 PM Dictation workstation:   [id number]    MR brain wo IV contrast  Result Date: 4/28/2025  1. Recent small nonhemorrhagic infarcts involving bilateral paramedian thalami and right midbrain. This has the typical imaging appearance of an artery of Percheron infarct, and correlates very well with the partial occlusion of the right posterior cerebral artery P1 segment demonstrated on the CTA study from yesterday. 2. The proximal P1 segment remains patent. I can not determine whether or not there is persistent thrombus within the distal  right P1 segment. There is no evidence to suggest propagation of thrombus. 3. Normal appearance to the deep venous system, including the internal cerebral veins. 4. Elsewhere, expected age-related changes in the brain including a mild degree of chronic microvascular ischemic signal change in the white matter.     MACRO: Richard Bishop discussed the significance and urgency of this critical finding by telephone with  DOV CELAYA on 4/28/2025 at 5:20 pm.  (**-RCF-**) Findings:  See findings.   Signed by: Richard Bishop 4/28/2025 5:21 PM Dictation workstation:   [id number]    XR chest 1 view  Result Date: 4/27/2025  Satisfactory positioning of the endotracheal tube and nasogastric tube.   Postoperative findings without acute cardiopulmonary process.   Signed by: Abbey Peterson 4/27/2025 4:25 PM Dictation workstation:   [id number]    XR chest 1 view  Result Date: 4/27/2025  Support devices as above. No focal infiltrate.   MACRO: None.   Signed by: Obdulia Michaud 4/27/2025 12:09 PM Dictation workstation:   [id number]    CT brain attack angio head and neck W and WO IV contrast  Result Date: 4/27/2025  Findings suggesting potential thrombosis rather than hypoplasia of the right P1 posterior cerebral artery with distal reconstitution via a small right posterior communicating artery. Diminutive and thready appearance of the right distal vertebral artery beyond the right V3 V4 junction. Please correlate clinically with presenting complaints and consider MRI/MRA for instance for further evaluation if clinically indicated.   MACRO: Ismael Crowley discussed the significance and urgency of this critical finding by telephone with  Dr. Castro on 4/27/2025 at 10:49 am. (**-RCF-**) Findings:  See findings.   Signed by: Ismael Crowley 4/27/2025 10:59 AM Dictation workstation:   [id number]    CT brain attack head wo IV contrast  Result Date: 4/27/2025  No CT evidence of acute large vessel territory infarct or intracranial hemorrhage.  Please correlate with the separately reported CT angiogram and consider MRI as warranted.   MACRO: Salazar Ko discussed the significance and urgency of this critical finding by Epic secure chat with  ZAKIA ALEXANDRE on 4/27/2025 at 10:40 am.  (**-RCF-**) Findings:  See findings.   Signed by: Salazar Ko 4/27/2025 10:57 AM Dictation workstation:   [id number]      Cardiology, Vascular, and Other Imaging  ECG 12 lead  Result Date: 4/28/2025  Normal sinus rhythm with sinus arrhythmia T wave abnormality, consider inferior ischemia QTcB >= 480 msec Abnormal ECG No previous ECGs available           Assessment / Plan     TO FOLLOW UP AFTER TRANSFER  ---------------------------------------  - Increase Metoprolol XL to 200 mg daily (home dose) as tolerated  - Resume Losartan  - Patient unable to afford Brilinta per reports, please look into plavix as an alternative  - PT/OT  - follow up with neuro recs and outpt follow up  - Ophthalmology follow up as outpatient    64 YOM with h/o DLP, HTN, CAD, CHF, Afib who was brought in to the ED after being found unresponsive this am. CT angio H&N showed potential thrombosis of the P1 segment of the R PCA. Tele-Stroke was consulted who did not feel TNK was warranted, as unclear last known well time. ICU at  was then called and accepted to the ICU. Patient is currently extubated 2/28 and confirmed to have an acute PCA stroke.     Neuro: Acute Rt PCA, Artery of Percheron stroke      CT head no acute bleed/CVA. CT angio H&N  Thrombosis of P1 segment of the R PCA. Evaluated by tele neuro-stroke, not a candidate for TNK.  Neurology consulted and following.       Frequent neuro check       MRI of the brain confirmed above findings       Secondary prevention, resumed ASA, and Brilinta        Holding home Tramadol     CV: h/o HFrEF 35% (global hypokinesia), HTN, DLP, CAD, Afib. Currently no acute issues. Currently in NSR       Continue home ASA, Atorvastatin and Zetia       Hold home  Losartan. Continue Metoprolol XL @50mg daily        Cards ok with changing Brilinta to Plavix       Will get repeat echo 2/29     Respiratory: Extubated to 2 L nasal canula, continue to wean off.  May need nicotine patch         : no issues     GI: start diet and bowel regimen     Endo: no acute issues        Hem/Onc: no acute issues       Continue to monitor for S&S infection.      ID: no findings suggestive of infection.      Continue to monitor for S&S infection.            DVT prophylaxis: subcutaneous heparin.            I have personally interviewed and examined the patient.  I have personally verified elements of the exam listed above. Interval changes or irregularities are as noted.  I have personally and independently reviewed laboratory, radiographic and procedural data.  I have personally reviewed the problem list above and concur. Changes, if any, are noted.  I have personally reviewed the plan list above and concur. Changes, if any, are noted.    The patient has high probability of compromise including but not limited to organ system failure, mechanical respiratory and circulatory support, cardiac arrest and death. I have discussed this in detail with the family / caregivers.    I have personally spent 45 minutes of face to face critical care time (not including billable procedures billed separately) in taking care of the patient.  I have personally answered all questions to the satisfaction of the patient and / or family members to their satisfaction.        Jaciel Kline MD        [1] aspirin, 81 mg, orogastric tube, Daily  atorvastatin, 80 mg, orogastric tube, Nightly  clopidogrel, 75 mg, oral, Daily  cyanocobalamin, 100 mcg, oral, Daily  enoxaparin, 40 mg, subcutaneous, Daily  ezetimibe, 10 mg, oral, Daily  insulin lispro, 0-5 Units, subcutaneous, Before meals & nightly  [Held by provider] losartan, 100 mg, oral, Daily  metoprolol succinate XL, 50 mg, oral, Daily  oxygen, , inhalation,  Continuous - Inhalation  psyllium, 1 packet, oral, Daily     [2]

## 2025-04-29 NOTE — NURSING NOTE
AM care done  OT/PT assisted pt up to chair  Pt oriented x4 pt intermittent confusion, reorients easily.  Pt called daughter by his other daughters name  feng

## 2025-04-29 NOTE — PROGRESS NOTES
Occupational Therapy    Evaluation/Treatment    Patient Name: Mateo Woodall  MRN: [id number]  Department: [address] ICU  Room: 09/09-A  Today's Date: 04/29/25  Time Calculation  Start Time: 0940  Stop Time: 1006  Time Calculation (min): 26 min       Assessment:  OT Assessment: Pt presents on eval with generalized weakness, new multiple vision deficits, RUE limitations, decreased activity tolerance, and poor standing balance affecting self-care and functional transfers/mobility. Pt will benefit from continued skilled OT to address these deficits and facilitate returning to functional baseline.  Prognosis: Good  Barriers to Discharge Home: Physical needs  Physical Needs: Ambulating household distances limited by function/safety, 24hr mobility assistance needed, Intermittent ADL assistance needed, High falls risk due to function or environment  Evaluation/Treatment Tolerance: Treatment limited secondary to medical complications (Comment), Other (Comment) (Increased dizziness and visual deficits)  End of Session Communication: Bedside nurse  End of Session Patient Position: Up in chair, Alarm on (Needs in reach.)  OT Assessment Results: Decreased ADL status, Decreased upper extremity strength, Decreased safe judgment during ADL, Decreased endurance, Visual deficit, Decreased functional mobility, Decreased gross motor control, Decreased IADLs, Decreased trunk control for functional activities  Strengths: Premorbid level of function  Barriers to Participation: Comorbidities    Plan:  Treatment Interventions: ADL retraining, Visual perceptual retraining, Functional transfer training, UE strengthening/ROM, Endurance training, Patient/family training, Equipment evaluation/education, Neuromuscular reeducation, Compensatory technique education  OT Frequency: 5 times per week  OT Discharge Recommendations: High intensity level of continued care  Equipment Recommended upon Discharge: Wheeled walker  OT Recommended Transfer Status:  "Moderate assist  OT - OK to Discharge: Yes      Subjective     General:   OT Received On: 04/29/25  General  Reason for Referral: impaired ADL's/mobility, vision impairment  Referred By: Jaciel Kline MD  Past Medical History Relevant to Rehab: dyslipidemia, HTN, CAD, CHF, a-fib  Family/Caregiver Present: Yes  Caregiver Feedback: Dtr. at bedside.  Co-Treatment: PT  Co-Treatment Reason: Medically/physically complex ICU eval  Prior to Session Communication: Bedside nurse  Patient Position Received: Bed, 3 rail up, Alarm off, caregiver present  General Comment: Pt is a 65 yo male admitted to the hospital with AMS after being found unresponsive at home. Pt has been complaining of worsening vision, so a MR brain was ordered, which revealed, \"acute bilateral infarcts involving ventral and  medial portions of bilateral thalami, and extending down into the  subthalamic regions to involve the lateral walls of the 3rd ventricle.\"     Precautions:  Hearing/Visual Limitations: Pt reporting new blurred vision, double vision, difficulty focusing, and tracking issues observed  Medical Precautions: Fall precautions    Vital Signs Comment: HR 89, pulse ox 96%, /116(nurse aware)     Pain:  Pain Assessment  Pain Assessment: FLACC (Face, Legs, Activity, Cry, Consolability)  0-10 (Numeric) Pain Score: 3  Pain Type: Acute pain  Pain Location: Arm  Pain Orientation: Right (only with movement in the shoulder)  Pain Interventions: Rest    Objective     Cognition:  Overall Cognitive Status: Within Functional Limits  Orientation Level: Oriented X4  Insight: Mild    Home Living:  Type of Home: House  Lives With: Friends  Home Adaptive Equipment: Walker rolling or standard, Cane, Wheelchair-manual, Crutches  Home Layout: One level  Home Access: Level entry  Bathroom Shower/Tub: Tub/shower unit  Bathroom Toilet: Standard  Bathroom Equipment: Grab bars in shower, Shower chair with back    Prior Function:  Level of [place]: " Independent with ADLs and functional transfers, Independent with homemaking with ambulation  ADL Assistance: Independent  Homemaking Assistance: Independent  Ambulatory Assistance: Independent  Vocational: Retired  Hand Dominance: Right  Prior Function Comments: Pt was active and driving PTA.    ADL:  Eating Assistance: Stand by  Eating Deficit: Verbal cueing, Setup (due to new vision issues)  Grooming Assistance: Minimal  Grooming Deficit: Wash/dry face, Teeth care  Bathing Assistance: Moderate  UE Dressing Assistance: Minimal  UE Dressing Deficit: Thread RUE  LE Dressing Assistance: Moderate  Toileting Assistance with Device: Moderate    Activity Tolerance:  Activity Tolerance Comments: Fair- and limited by worsening dizziness with functional mobility    Bed Mobility/Transfers: Bed Mobility  Bed Mobility:  (Pt completed supine to sit in bed with min A for trunk up.)    Transfers  Transfer:  (Pt completed sit<>stand multiple times with mod A for balance/safety.)    Functional Mobility:  Functional Mobility  Functional Mobility Performed:  (Pt completed short functional mobility using no device with mod A for balance/safety due to vision deficits and worsening dizziness.)    Standing Balance:  Static Standing Balance  Static Standing-Balance Support: Right upper extremity supported  Static Standing-Level of Assistance: Moderate assistance    Therapy/Activity: Therapeutic Activity  Therapeutic Activity Performed:  (See bed mobility, transfers, functional mobility, and grooming with verbal cues throughout.)    Sensation:  Sensation Comment: BUE's WFL    Strength:  Strength Comments: RUE 3-/5 shoulder, 3+/5 distally (LUE 4/5)    Coordination:  Coordination Comment: RUE impaired grossly, LUE WFL grossly     Hand Function:  Hand Function  Gross Grasp: Functional  Coordination: Functional      Outcome Measures: [facility] Daily Activity  Putting on and taking off regular lower body clothing: A lot  Bathing (including washing,  rinsing, drying): A lot  Putting on and taking off regular upper body clothing: A little  Toileting, which includes using toilet, bedpan or urinal: A lot  Taking care of personal grooming such as brushing teeth: A little  Eating Meals: A little  Daily Activity - Total Score: 15    Education Documentation  No documentation found.  Education Comments  No comments found.    Goals:  Encounter Problems       Encounter Problems (Active)       OT Goals       Pt will complete self-feeding with mod indep. (Progressing)       Start:  04/29/25    Expected End:  05/29/25            Pt will complete all grooming tasks with close S to setup in standing. (Progressing)       Start:  04/29/25    Expected End:  05/29/25            Pt will complete UB dressing/bathing with setup and LB dressing/bathing with close S. (Progressing)       Start:  04/29/25    Expected End:  05/29/25            Pt will complete all toileting tasks with close S. (Progressing)       Start:  04/29/25    Expected End:  05/29/25            Pt will complete all functional transfers and mobility with close S using a device or no device. (Progressing)       Start:  04/29/25    Expected End:  05/29/25

## 2025-04-29 NOTE — PROGRESS NOTES
Critical Care Medicine Progress Note    Admitted on:     4/27/2025  Length of Stay: 2 day(s)     Interval History     Mateo Woodall is a 64 y.o. male with h/o DLP, HTN, CAD, CHF, Afib who was brought in to the ED after being found unresponsive this am. No fall or trauma. Of note had bleeding from his R ear last week. In the ED CT head did not show evidence of acute bleed or mass effect. CT angio H&N showed potential thrombosis of the P1 segment of the R PCA. Tele-Stroke was consulted who did not feel TNK was warranted, as unclear last known well time. ICU at  was then called and accepted to the ICU. Of note was intubated in the ED for respiratory failure and airway protection. He remained hemodynamically stable until his arrival.     4/28/25: Patient was successfully extubated in the am to nasal canula. Has some residual vision defects. MRI brain done which confirms Recent small nonhemorrhagic infarcts involving bilateral paramedian thalami and right midbrain. 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 Neurology is following. Patient restarted on dual antiplatelets and B blocker.    Objective   Objective     Vitals:    04/29/25 0400   Weight: 62.1 kg (136 lb 14.5 oz)   Body mass index is 21.44 kg/m².        4/28/2025    11:00 PM 4/29/2025    12:00 AM 4/29/2025     1:00 AM 4/29/2025     2:00 AM 4/29/2025     3:00 AM 4/29/2025     4:00 AM 4/29/2025     5:00 AM   Vitals   Systolic 112 91 109 137 159 136 110   Diastolic 49 41 78 63 64 84 45   BP Location  Left arm    Left arm    Heart Rate 87 83 67 58 58 63 59   Temp  37.5 °C (99.5 °F)    37.1 °C (98.8 °F)    Resp 17 13 16 13 13 15 14   Weight (lb)      136.91    BMI      21.44 kg/m2    BSA (m2)      1.71 m2         Vent settings:  Vent Mode: Pressure support  S RR:  [15] 15  S VT:  [400  mL] 400 mL  PEEP/CPAP (cm H2O):  [5 cm H20] 5 cm H20  CT SUP:  [5 cm H20] 5 cm H20  MAP (cm H2O):  [6-7] 6    Intake/Output Summary (Last 24 hours) at 4/29/2025 0602  Last data filed at 4/29/2025 0000  Gross per 24 hour   Intake 2045.35 ml   Output 50 ml   Net 1995.35 ml       Physical Exam  ----------------  Patient awake and alert post extubation  Partial vertical vision defects reported  Moist MM  PERRL  Air entry bilaterally equal, S1 S2 wnl  Abdomen soft and non tender  Extremities no edema    Medications     Scheduled Medications:   Scheduled Medications[1]   Continuous Medications:   Continuous Medications[2]   PRN Medications:     Labs     Results from last 72 hours   Lab Units 04/28/25  0509 04/27/25  1027   GLUCOSE mg/dL 112* 102*   SODIUM mmol/L 143 139   POTASSIUM mmol/L 3.5 3.9   CHLORIDE mmol/L 111* 106   CO2 mmol/L 27 26   BUN mg/dL 10 12   CREATININE mg/dL 0.92 0.84   EGFR mL/min/1.73m*2 >90 >90   CALCIUM mg/dL 8.2* 8.9   ALBUMIN g/dL 3.1* 3.9   MAGNESIUM mg/dL 1.84 1.99   PHOSPHORUS mg/dL 3.9  --      Results from last 72 hours   Lab Units 04/28/25  0509 04/27/25  1027   ALK PHOS U/L 43 51   ALT U/L 8* 9*   AST U/L 14 16   BILIRUBIN TOTAL mg/dL 0.3 0.8   PROTEIN TOTAL g/dL 5.8* 6.7     Results from last 72 hours   Lab Units 04/27/25  1121 04/27/25  1027   TROPHS ng/L 11 12     Results from last 72 hours   Lab Units 04/27/25  1027   LACTATE mmol/L 1.2     Results from last 72 hours   Lab Units 04/28/25  0509 04/27/25  1027   WBC AUTO x10*3/uL 10.0 9.1   NRBC AUTO /100 WBCs 0.0 0.0   RBC AUTO x10*6/uL 3.52* 4.13*   HEMOGLOBIN g/dL 11.9* 13.5   HEMATOCRIT % 35.5* 40.7*   MCV fL 101* 99   MCH pg 33.8 32.7   MCHC g/dL 33.5 33.2   RDW % 13.2 12.9   PLATELETS AUTO x10*3/uL 137* 163     Results from last 72 hours   Lab Units 04/28/25  0835 04/28/25  0527 04/27/25  1647 04/27/25  1235   POCT PH, ARTERIAL pH  --  7.36* 7.41 7.40   POCT PCO2, ARTERIAL mm Hg  --  51* 45* 45*   POCT PO2, ARTERIAL mm Hg  --  127*  "146* 165*   POCT HCO3 CALCULATED, ARTERIAL mmol/L  --  28.8* 28.5* 27.9*   POCT LACTATE, ARTERIAL mmol/L  --  0.5 0.6 1.1   POCT BASE EXCESS, ARTERIAL mmol/L  --  2.5 3.2* 2.5   FIO2 % 40 40 40 40     No results found for: \"BLOODCULT\", \"GRAMSTAIN\"  No results found for: \"URINECULTURE\"    Imaging and Diagnostic Studies     Lab/Radiology/Diagnostic Review:  Results for orders placed or performed during the hospital encounter of 04/27/25 (from the past 24 hours)   POCT GLUCOSE   Result Value Ref Range    POCT Glucose 115 (H) 74 - 99 mg/dL   Blood Gas Venous   Result Value Ref Range    POCT pH, Venous 7.37 7.33 - 7.43 pH    POCT pCO2, Venous 45 41 - 51 mm Hg    POCT pO2, Venous 77 (H) 35 - 45 mm Hg    POCT SO2, Venous 96 (H) 45 - 75 %    POCT Oxy Hemoglobin, Venous 93.3 (H) 45.0 - 75.0 %    POCT Base Excess, Venous 0.3 -2.0 - 3.0 mmol/L    POCT HCO3 Calculated, Venous 26.0 22.0 - 26.0 mmol/L    Patient Temperature 37.0 degrees Celsius    FiO2 40 %   POCT GLUCOSE   Result Value Ref Range    POCT Glucose 131 (H) 74 - 99 mg/dL   POCT GLUCOSE   Result Value Ref Range    POCT Glucose 207 (H) 74 - 99 mg/dL   POCT GLUCOSE   Result Value Ref Range    POCT Glucose 109 (H) 74 - 99 mg/dL     Imaging  MR cervical spine wo IV contrast  Result Date: 4/28/2025  1. No evidence of acute abnormality. 2. Developmental central canal stenosis. 3. Multilevel degenerative cervical spondylosis as described above.   MACRO: None   Signed by: Sunil Queen 4/28/2025 7:35 PM Dictation workstation:   [id number]    MR brain wo IV contrast  Result Date: 4/28/2025  1. Recent small nonhemorrhagic infarcts involving bilateral paramedian thalami and right midbrain. This has the typical imaging appearance of an artery of Percheron infarct, and correlates very well with the partial occlusion of the right posterior cerebral artery P1 segment demonstrated on the CTA study from yesterday. 2. The proximal P1 segment remains patent. I can not determine " whether or not there is persistent thrombus within the distal right P1 segment. There is no evidence to suggest propagation of thrombus. 3. Normal appearance to the deep venous system, including the internal cerebral veins. 4. Elsewhere, expected age-related changes in the brain including a mild degree of chronic microvascular ischemic signal change in the white matter.     MACRO: Richard Bishop discussed the significance and urgency of this critical finding by telephone with  DOV CELAYA on 4/28/2025 at 5:20 pm.  (**-RCF-**) Findings:  See findings.   Signed by: Richard Bishop 4/28/2025 5:21 PM Dictation workstation:   [id number]    XR chest 1 view  Result Date: 4/27/2025  Satisfactory positioning of the endotracheal tube and nasogastric tube.   Postoperative findings without acute cardiopulmonary process.   Signed by: Abbey Peterson 4/27/2025 4:25 PM Dictation workstation:   [id number]    XR chest 1 view  Result Date: 4/27/2025  Support devices as above. No focal infiltrate.   MACRO: None.   Signed by: Obdulia Michaud 4/27/2025 12:09 PM Dictation workstation:   [id number]    CT brain attack angio head and neck W and WO IV contrast  Result Date: 4/27/2025  Findings suggesting potential thrombosis rather than hypoplasia of the right P1 posterior cerebral artery with distal reconstitution via a small right posterior communicating artery. Diminutive and thready appearance of the right distal vertebral artery beyond the right V3 V4 junction. Please correlate clinically with presenting complaints and consider MRI/MRA for instance for further evaluation if clinically indicated.   MACRO: Ismael Crowley discussed the significance and urgency of this critical finding by telephone with  Dr. Castro on 4/27/2025 at 10:49 am. (**-RCF-**) Findings:  See findings.   Signed by: Ismael Crowley 4/27/2025 10:59 AM Dictation workstation:   [id number]    CT brain attack head wo IV contrast  Result Date: 4/27/2025  No CT evidence of  acute large vessel territory infarct or intracranial hemorrhage. Please correlate with the separately reported CT angiogram and consider MRI as warranted.   MACRO: Salazar Ko discussed the significance and urgency of this critical finding by Epic secure chat with  ZAKIASA ALCANTARADT on 4/27/2025 at 10:40 am.  (**-RCF-**) Findings:  See findings.   Signed by: Salazar Ko 4/27/2025 10:57 AM Dictation workstation:   [id number]      Cardiology, Vascular, and Other Imaging  ECG 12 lead  Result Date: 4/28/2025  Normal sinus rhythm with sinus arrhythmia T wave abnormality, consider inferior ischemia QTcB >= 480 msec Abnormal ECG No previous ECGs available           Assessment / Plan     64 YOM with h/o DLP, HTN, CAD, CHF, Afib who was brought in to the ED after being found unresponsive this am. CT angio H&N showed potential thrombosis of the P1 segment of the R PCA. Tele-Stroke was consulted who did not feel TNK was warranted, as unclear last known well time. ICU at  was then called and accepted to the ICU. Patient is currently extubated today and confirmed to have an acute PCA stroke.     Neuro: Acute PCA, Artery of Percheron stoke      CT head no acute bleed/CVA. CT angio H&N ? Thrombosis of P1 segment of the R PCA. Evaluated by tele neuro-stroke, not a candidate for TNK.      Neurology consulted, awaiting their input       Frequent neuro check       MRI of the brain confirmed above findings       Secondary prevention, resumed ASA, and Brilinta        Holding home Tramadol     CV: h/o HFrEF 35% (global hypokinesia), HTN, DLP, CAD, Afib. Currently no acute issues. Currently in NSR       Continue home ASA, Atorvastatin and Zetia       Hold home Losartan. Continue Metoprolol XL @50mg daily   Cards ok with changing Brilinta to Plavix       Will get repeat echo      Respiratory: Extubated to 2 L nasal canula, continue to wean off.  May need nicotine patch         : no issues     GI: start diet and bowel regimen      Endo: no acute issues        Hem/Onc: no acute issues       Continue to monitor for S&S infection.      ID: no findings suggestive of infection.      Continue to monitor for S&S infection.            DVT prophylaxis: subcutaneous heparin.        I have personally interviewed and examined the patient.  I have personally verified elements of the exam listed above. Interval changes or irregularities are as noted.  I have personally and independently reviewed laboratory, radiographic and procedural data.  I have personally reviewed the problem list above and concur. Changes, if any, are noted.  I have personally reviewed the plan list above and concur. Changes, if any, are noted.    The patient has high probability of compromise including but not limited to organ system failure, mechanical respiratory and circulatory support, cardiac arrest and death. I have discussed this in detail with the family / caregivers.    I have personally spent 45 minutes of face to face critical care time (not including billable procedures billed separately) in taking care of the patient.  I have personally answered all questions to the satisfaction of the patient and / or family members to their satisfaction.        Jaciel Kline MD        [1] aspirin, 81 mg, orogastric tube, Daily  atorvastatin, 80 mg, orogastric tube, Nightly  cyanocobalamin, 100 mcg, oral, Daily  enoxaparin, 40 mg, subcutaneous, Daily  ezetimibe, 10 mg, oral, Daily  insulin lispro, 0-5 Units, subcutaneous, Before meals & nightly  [Held by provider] losartan, 100 mg, oral, Daily  metoprolol succinate XL, 50 mg, oral, Daily  oxygen, , inhalation, Continuous - Inhalation  psyllium, 1 packet, oral, Daily  ticagrelor, 90 mg, oral, BID  [2]

## 2025-04-30 ENCOUNTER — APPOINTMENT (OUTPATIENT)
Dept: NEUROLOGY | Facility: HOSPITAL | Age: 65
End: 2025-04-30

## 2025-04-30 LAB
ALBUMIN SERPL BCP-MCNC: 3.2 G/DL (ref 3.4–5)
ALP SERPL-CCNC: 38 U/L (ref 33–136)
ALT SERPL W P-5'-P-CCNC: 8 U/L (ref 10–52)
ANION GAP SERPL CALCULATED.3IONS-SCNC: 8 MMOL/L (ref 10–20)
AST SERPL W P-5'-P-CCNC: 13 U/L (ref 9–39)
ATRIAL RATE: 84 BPM
BASOPHILS # BLD AUTO: 0.04 X10*3/UL (ref 0–0.1)
BASOPHILS NFR BLD AUTO: 0.5 %
BILIRUB SERPL-MCNC: 0.4 MG/DL (ref 0–1.2)
BUN SERPL-MCNC: 13 MG/DL (ref 6–23)
CALCIUM SERPL-MCNC: 8.4 MG/DL (ref 8.6–10.3)
CHLORIDE SERPL-SCNC: 110 MMOL/L (ref 98–107)
CO2 SERPL-SCNC: 27 MMOL/L (ref 21–32)
CREAT SERPL-MCNC: 0.66 MG/DL (ref 0.5–1.3)
EGFRCR SERPLBLD CKD-EPI 2021: >90 ML/MIN/1.73M*2
EOSINOPHIL # BLD AUTO: 0.33 X10*3/UL (ref 0–0.7)
EOSINOPHIL NFR BLD AUTO: 4 %
ERYTHROCYTE [DISTWIDTH] IN BLOOD BY AUTOMATED COUNT: 12.8 % (ref 11.5–14.5)
GLUCOSE BLD MANUAL STRIP-MCNC: 110 MG/DL (ref 74–99)
GLUCOSE BLD MANUAL STRIP-MCNC: 111 MG/DL (ref 74–99)
GLUCOSE BLD MANUAL STRIP-MCNC: 125 MG/DL (ref 74–99)
GLUCOSE BLD MANUAL STRIP-MCNC: 98 MG/DL (ref 74–99)
GLUCOSE SERPL-MCNC: 96 MG/DL (ref 74–99)
HCT VFR BLD AUTO: 35.2 % (ref 41–52)
HGB BLD-MCNC: 11.8 G/DL (ref 13.5–17.5)
IMM GRANULOCYTES # BLD AUTO: 0.02 X10*3/UL (ref 0–0.7)
IMM GRANULOCYTES NFR BLD AUTO: 0.2 % (ref 0–0.9)
LYMPHOCYTES # BLD AUTO: 2.12 X10*3/UL (ref 1.2–4.8)
LYMPHOCYTES NFR BLD AUTO: 25.4 %
MAGNESIUM SERPL-MCNC: 1.87 MG/DL (ref 1.6–2.4)
MCH RBC QN AUTO: 33.6 PG (ref 26–34)
MCHC RBC AUTO-ENTMCNC: 33.5 G/DL (ref 32–36)
MCV RBC AUTO: 100 FL (ref 80–100)
MONOCYTES # BLD AUTO: 0.65 X10*3/UL (ref 0.1–1)
MONOCYTES NFR BLD AUTO: 7.8 %
NEUTROPHILS # BLD AUTO: 5.18 X10*3/UL (ref 1.2–7.7)
NEUTROPHILS NFR BLD AUTO: 62.1 %
NRBC BLD-RTO: 0 /100 WBCS (ref 0–0)
P AXIS: 78 DEGREES
P OFFSET: 198 MS
P ONSET: 146 MS
PHOSPHATE SERPL-MCNC: 3.7 MG/DL (ref 2.5–4.9)
PLATELET # BLD AUTO: 131 X10*3/UL (ref 150–450)
POTASSIUM SERPL-SCNC: 3.8 MMOL/L (ref 3.5–5.3)
PR INTERVAL: 140 MS
PROT SERPL-MCNC: 5.8 G/DL (ref 6.4–8.2)
Q ONSET: 216 MS
QRS COUNT: 14 BEATS
QRS DURATION: 104 MS
QT INTERVAL: 408 MS
QTC CALCULATION(BAZETT): 482 MS
QTC FREDERICIA: 456 MS
R AXIS: 77 DEGREES
RBC # BLD AUTO: 3.51 X10*6/UL (ref 4.5–5.9)
SODIUM SERPL-SCNC: 141 MMOL/L (ref 136–145)
T AXIS: -38 DEGREES
T OFFSET: 420 MS
VENTRICULAR RATE: 84 BPM
WBC # BLD AUTO: 8.3 X10*3/UL (ref 4.4–11.3)

## 2025-04-30 PROCEDURE — 99232 SBSQ HOSP IP/OBS MODERATE 35: CPT | Performed by: INTERNAL MEDICINE

## 2025-04-30 PROCEDURE — 80053 COMPREHEN METABOLIC PANEL: CPT | Performed by: STUDENT IN AN ORGANIZED HEALTH CARE EDUCATION/TRAINING PROGRAM

## 2025-04-30 PROCEDURE — 83735 ASSAY OF MAGNESIUM: CPT | Performed by: STUDENT IN AN ORGANIZED HEALTH CARE EDUCATION/TRAINING PROGRAM

## 2025-04-30 PROCEDURE — 84100 ASSAY OF PHOSPHORUS: CPT | Performed by: STUDENT IN AN ORGANIZED HEALTH CARE EDUCATION/TRAINING PROGRAM

## 2025-04-30 PROCEDURE — 85025 COMPLETE CBC W/AUTO DIFF WBC: CPT | Performed by: STUDENT IN AN ORGANIZED HEALTH CARE EDUCATION/TRAINING PROGRAM

## 2025-04-30 PROCEDURE — 36415 COLL VENOUS BLD VENIPUNCTURE: CPT | Performed by: STUDENT IN AN ORGANIZED HEALTH CARE EDUCATION/TRAINING PROGRAM

## 2025-04-30 PROCEDURE — 2060000001 HC INTERMEDIATE ICU ROOM DAILY

## 2025-04-30 PROCEDURE — 2500000001 HC RX 250 WO HCPCS SELF ADMINISTERED DRUGS (ALT 637 FOR MEDICARE OP): Performed by: STUDENT IN AN ORGANIZED HEALTH CARE EDUCATION/TRAINING PROGRAM

## 2025-04-30 PROCEDURE — 2500000002 HC RX 250 W HCPCS SELF ADMINISTERED DRUGS (ALT 637 FOR MEDICARE OP, ALT 636 FOR OP/ED): Performed by: STUDENT IN AN ORGANIZED HEALTH CARE EDUCATION/TRAINING PROGRAM

## 2025-04-30 PROCEDURE — 97110 THERAPEUTIC EXERCISES: CPT | Mod: GO,CO

## 2025-04-30 PROCEDURE — 2500000004 HC RX 250 GENERAL PHARMACY W/ HCPCS (ALT 636 FOR OP/ED): Mod: JZ | Performed by: STUDENT IN AN ORGANIZED HEALTH CARE EDUCATION/TRAINING PROGRAM

## 2025-04-30 PROCEDURE — 97535 SELF CARE MNGMENT TRAINING: CPT | Mod: GO,CO

## 2025-04-30 PROCEDURE — 97530 THERAPEUTIC ACTIVITIES: CPT | Mod: GO,CO

## 2025-04-30 PROCEDURE — 97112 NEUROMUSCULAR REEDUCATION: CPT | Mod: GP,CQ

## 2025-04-30 PROCEDURE — 82947 ASSAY GLUCOSE BLOOD QUANT: CPT

## 2025-04-30 PROCEDURE — 2500000005 HC RX 250 GENERAL PHARMACY W/O HCPCS: Performed by: STUDENT IN AN ORGANIZED HEALTH CARE EDUCATION/TRAINING PROGRAM

## 2025-04-30 PROCEDURE — 97116 GAIT TRAINING THERAPY: CPT | Mod: GP,CQ

## 2025-04-30 PROCEDURE — 99232 SBSQ HOSP IP/OBS MODERATE 35: CPT

## 2025-04-30 PROCEDURE — 97530 THERAPEUTIC ACTIVITIES: CPT | Mod: GP,CQ

## 2025-04-30 RX ADMIN — ATORVASTATIN CALCIUM 80 MG: 80 TABLET, FILM COATED ORAL at 19:42

## 2025-04-30 RX ADMIN — ENOXAPARIN SODIUM 40 MG: 40 INJECTION SUBCUTANEOUS at 08:32

## 2025-04-30 RX ADMIN — LOSARTAN POTASSIUM 25 MG: 25 TABLET, FILM COATED ORAL at 08:31

## 2025-04-30 RX ADMIN — Medication 21 PERCENT: at 19:43

## 2025-04-30 RX ADMIN — ASPIRIN 81 MG: 81 TABLET, CHEWABLE ORAL at 08:32

## 2025-04-30 RX ADMIN — EZETIMIBE 10 MG: 10 TABLET ORAL at 08:32

## 2025-04-30 RX ADMIN — Medication 21 PERCENT: at 08:23

## 2025-04-30 RX ADMIN — METOPROLOL SUCCINATE 100 MG: 100 TABLET, EXTENDED RELEASE ORAL at 08:32

## 2025-04-30 RX ADMIN — VITAM B12 100 MCG: 100 TAB at 08:31

## 2025-04-30 RX ADMIN — CLOPIDOGREL 75 MG: 75 TABLET ORAL at 08:31

## 2025-04-30 RX ADMIN — INSULIN LISPRO 3 UNITS: 100 INJECTION, SOLUTION INTRAVENOUS; SUBCUTANEOUS at 20:31

## 2025-04-30 RX ADMIN — PSYLLIUM HUSK 1 PACKET: 3.4 POWDER ORAL at 08:31

## 2025-04-30 ASSESSMENT — COGNITIVE AND FUNCTIONAL STATUS - GENERAL
HELP NEEDED FOR BATHING: A LITTLE
STANDING UP FROM CHAIR USING ARMS: A LITTLE
MOBILITY SCORE: 20
DAILY ACTIVITIY SCORE: 16
PERSONAL GROOMING: A LITTLE
STANDING UP FROM CHAIR USING ARMS: A LITTLE
PERSONAL GROOMING: A LITTLE
MOVING TO AND FROM BED TO CHAIR: A LITTLE
WALKING IN HOSPITAL ROOM: A LITTLE
MOVING TO AND FROM BED TO CHAIR: A LITTLE
EATING MEALS: A LITTLE
STANDING UP FROM CHAIR USING ARMS: A LITTLE
HELP NEEDED FOR BATHING: A LITTLE
WALKING IN HOSPITAL ROOM: A LITTLE
DAILY ACTIVITIY SCORE: 21
CLIMB 3 TO 5 STEPS WITH RAILING: A LITTLE
DRESSING REGULAR UPPER BODY CLOTHING: A LITTLE
TOILETING: A LITTLE
TOILETING: A LITTLE
DAILY ACTIVITIY SCORE: 21
CLIMB 3 TO 5 STEPS WITH RAILING: TOTAL
DRESSING REGULAR LOWER BODY CLOTHING: A LOT
MOBILITY SCORE: 20
HELP NEEDED FOR BATHING: A LOT
PERSONAL GROOMING: A LITTLE
WALKING IN HOSPITAL ROOM: A LITTLE
MOVING FROM LYING ON BACK TO SITTING ON SIDE OF FLAT BED WITH BEDRAILS: A LITTLE
MOVING TO AND FROM BED TO CHAIR: A LITTLE
CLIMB 3 TO 5 STEPS WITH RAILING: A LITTLE
MOBILITY SCORE: 16
TURNING FROM BACK TO SIDE WHILE IN FLAT BAD: A LITTLE
TOILETING: A LITTLE

## 2025-04-30 ASSESSMENT — PAIN SCALES - GENERAL
PAINLEVEL_OUTOF10: 0 - NO PAIN

## 2025-04-30 ASSESSMENT — PAIN - FUNCTIONAL ASSESSMENT
PAIN_FUNCTIONAL_ASSESSMENT: 0-10

## 2025-04-30 ASSESSMENT — ACTIVITIES OF DAILY LIVING (ADL): HOME_MANAGEMENT_TIME_ENTRY: 13

## 2025-04-30 ASSESSMENT — PAIN SCALES - WONG BAKER: WONGBAKER_NUMERICALRESPONSE: NO HURT

## 2025-04-30 NOTE — PROGRESS NOTES
Occupational Therapy    OT Treatment    Patient Name: Mateo Woodall  MRN: [id number]  Department: [address]  Room: 17/17-B  Today's Date: 4/30/2025  Time Calculation  Start Time: 0933  Stop Time: 1012  Time Calculation (min): 39 min        Assessment:  OT Assessment: Pt tolerated session well, progressing toward POC, able to complete ADL tasks in standing, demonstrated double vision with additional cueing required for L side. Pt would benefit from continued skilled OT services to improve strength, balance, and activity tolerance to increase independence with ADLs and task efficiency  Barriers to Discharge Home: Physical needs  End of Session Communication: Bedside nurse  End of Session Patient Position: Up in chair, Alarm on  OT Assessment Results: Decreased ADL status, Decreased upper extremity strength, Decreased safe judgment during ADL, Decreased endurance, Visual deficit, Decreased functional mobility, Decreased gross motor control, Decreased IADLs, Decreased trunk control for functional activities  Plan:  Treatment Interventions: ADL retraining, Visual perceptual retraining, Functional transfer training, UE strengthening/ROM, Endurance training, Patient/family training, Equipment evaluation/education, Neuromuscular reeducation, Compensatory technique education  OT Frequency: 5 times per week  OT Discharge Recommendations: High intensity level of continued care  Equipment Recommended upon Discharge: Wheeled walker  OT Recommended Transfer Status: Moderate assist  OT - OK to Discharge: Yes  Treatment Interventions: ADL retraining, Visual perceptual retraining, Functional transfer training, UE strengthening/ROM, Endurance training, Patient/family training, Equipment evaluation/education, Neuromuscular reeducation, Compensatory technique education    Subjective   Previous Visit Info:     General:  General  Reason for Referral: impaired ADL's/mobility, vision impairment  Family/Caregiver Present: Yes  Caregiver  Feedback: daughter at bedside  Co-Treatment: PT  Co-Treatment Reason: CoTx to maximize pt and caregiver safety and optimize outcomes.  Prior to Session Communication: Bedside nurse  Patient Position Received: Bed, 3 rail up, Alarm off, caregiver present  General Comment: Pt cleared for therapy by RN. Pt found supine in bed upon arrival and agreeable to tx.  Precautions:  Hearing/Visual Limitations: + blurred vision, double vision, difficulty focusing, and tracking issues observed  Medical Precautions: Fall precautions  Precautions Comment: + tele     Date/Time Vitals Session Patient Position Pulse Resp SpO2 BP MAP (mmHg)    04/30/25 1105 --  --  93  --  --  --  --     04/30/25 1142 --  --  96  --  96 %  137/66  83                 Pain:  Pain Assessment  Pain Assessment: 0-10  0-10 (Numeric) Pain Score: 0 - No pain    Objective    Cognition:  Cognition  Orientation Level: Oriented X4  Coordination:  Movements are Fluid and Coordinated: No  Upper Body Coordination: Limited SCOTT ROM in shoulders.  Coordination Comment: decreased rate + accuracy of movements.  Activities of Daily Living: Grooming  Grooming Level of Assistance: Close supervision, Setup  Grooming Where Assessed: Standing sinkside  Grooming Comments: pt able to stand sinkside with s/u for hand hygiene and face washing.    Toileting  Toileting Level of Assistance: Close supervision, Setup  Where Assessed: Toilet  Toileting Comments: pt able to complete toileting task while seated with close S for safety.     Bed Mobility/Transfers: Bed Mobility  Bed Mobility: Yes  Bed Mobility 1  Bed Mobility 1: Supine to sitting  Level of Assistance 1: Contact guard  Bed Mobility Comments 1: CGA for task efficiency, pt able to advance BLE off of bed, use of bed rails for UE support, cues for scooting hips to EOB with increased time and effort for task completion.    Transfers  Transfer: Yes  Transfer 1  Technique 1: Sit to stand, Stand to sit  Transfer Device 1:  Walker  Transfer Level of Assistance 1: Minimum assistance  Trials/Comments 1: Min A for trunk elevation during sit>stand, cues provided for proper hand placement during eccentric lowering.    Functional Mobility:  Functional Mobility  Functional Mobility Performed: Yes  Functional Mobility 1  Device 1: Rolling walker  Assistance 1: Contact guard  Comments 1: pt participated in functional mobility a short household distance with FWW, demonstrated fair- balance, tactile and visual cues provided for orientation d/t visual deficits.  Standing Balance:  Dynamic Standing Balance  Dynamic Standing-Balance Support: No upper extremity supported  Dynamic Standing-Level of Assistance: Close supervision  Dynamic Standing-Balance: Reaching for objects, Reaching across midline  Dynamic Standing-Comments: fair balance during standing ADL tasks at sink.       Therapy/Activity: Therapeutic Exercise  Therapeutic Exercise Performed: Yes  Therapeutic Exercise Activity 1: BUE exercises in shoulder flexion, chest presses and internal/external rotation, and bicep curls 1x10 to improve strength and ROM to increase independence with functional activities and transfer tasks.    Therapeutic Activity  Therapeutic Activity Performed: Yes  Therapeutic Activity 1: pt participated in visual scanning activity reaching for various objects crossing midline, targeting peripheral fields d/t visual deficits during standing ADLs for around 10 mins. pt required increased cueing for object retrieval and attention to L visual field and cervically rotate to L to identify items.  Therapeutic Activity 2: pt tolerated sitting in chair for visual scanning activity, smooth pursuits with target sticks (vertical & horizontal) completed by PTA.       Vision:  Vision Comments: Pt demonstrated double vision, required additional cueing for L side during functional tasks.      Outcome Measures:[facility] Daily Activity  Putting on and taking off regular lower body clothing:  A lot  Bathing (including washing, rinsing, drying): A lot  Putting on and taking off regular upper body clothing: A little  Toileting, which includes using toilet, bedpan or urinal: A little  Taking care of personal grooming such as brushing teeth: A little  Eating Meals: A little  Daily Activity - Total Score: 16      Education Documentation  Body Mechanics, taught by TASIA Vega at 4/30/2025 12:00 PM.  Learner: Patient  Readiness: Acceptance  Method: Explanation  Response: Verbalizes Understanding, Needs Reinforcement  Comment: educated on safety awareness.    Home Exercise Program, taught by TASIA Vega at 4/30/2025 12:00 PM.  Learner: Patient  Readiness: Acceptance  Method: Explanation  Response: Verbalizes Understanding, Needs Reinforcement  Comment: educated on safety awareness.    ADL Training, taught by TASIA Vega at 4/30/2025 12:00 PM.  Learner: Patient  Readiness: Acceptance  Method: Explanation  Response: Verbalizes Understanding, Needs Reinforcement  Comment: educated on safety awareness.    Education Comments  No comments found.      Problem: OT Goals  Goal: Pt will complete self-feeding with mod indep.  Outcome: Progressing  Goal: Pt will complete all grooming tasks with close S to setup in standing.  Outcome: Progressing  Goal: Pt will complete UB dressing/bathing with setup and LB dressing/bathing with close S.  Outcome: Progressing  Goal: Pt will complete all toileting tasks with close S.  Outcome: Progressing  Goal: Pt will complete all functional transfers and mobility with close S using a device or no device.  Outcome: Progressing   Treatment & Documentation completed by Anand KHAN under the direct supervision of Dipika KOLB

## 2025-04-30 NOTE — PROGRESS NOTES
"Subjective   Patient continues to have both diplopia and restriction of vertical gaze but no other significant residual focal neurologic deficits.  His housemates are present at bedside today and they report having seen him just prior to the stroke in which he was complaining of feeling fatigued.  He lay down.  Within only a couple of minutes he had an alteration of mental status and he was completely unable to be aroused.  In short, the time of onset of his altered mental status is now more consistent with onset of stroke as it turns out to have been.     Objective   Neurological Exam  Physical Exam    Last Recorded Vitals  Blood pressure 121/68, pulse 78, temperature 37.1 °C (98.8 °F), temperature source Temporal, resp. rate 17, height 1.702 m (5' 7\"), weight 62.1 kg (136 lb 14.5 oz), SpO2 97%.      Neurologically, pt is awake and oriented x4. Attention, concentration, memory, cortical processing are intact. No aphasia  Cranial nerves: VFF, restriction of vertical gaze as well as abnormal yoking of the eyes, No nystagmus, Face is symmetric to sensory and motor, no dysarthria, Tongue protrudes midline, Shrug symmetric  Motor: 5/5 strength B throughout, no tremor or asterixis  Sensation is intact bilaterally throughout  Coordination: no ataxia, no dysmetria/dysdiadochokinesia         Scheduled medications  Scheduled Medications[1]  Continuous medications  Continuous Medications[2]  PRN medications  PRN Medications[3]     Results for orders placed or performed during the hospital encounter of 04/27/25 (from the past 96 hours)   POCT GLUCOSE   Result Value Ref Range    POCT Glucose 106 (H) 74 - 99 mg/dL   Blood Gas Arterial Full Panel   Result Value Ref Range    POCT pH, Arterial 7.41 7.38 - 7.42 pH    POCT pCO2, Arterial 45 (H) 38 - 42 mm Hg    POCT pO2, Arterial 146 (H) 85 - 95 mm Hg    POCT SO2, Arterial 100 94 - 100 %    POCT Oxy Hemoglobin, Arterial 96.7 94.0 - 98.0 %    POCT Hematocrit Calculated, Arterial 40.0 " (L) 41.0 - 52.0 %    POCT Sodium, Arterial 138 136 - 145 mmol/L    POCT Potassium, Arterial 3.7 3.5 - 5.3 mmol/L    POCT Chloride, Arterial 111 (H) 98 - 107 mmol/L    POCT Ionized Calcium, Arterial 1.23 1.10 - 1.33 mmol/L    POCT Glucose, Arterial 102 (H) 74 - 99 mg/dL    POCT Lactate, Arterial 0.6 0.4 - 2.0 mmol/L    POCT Base Excess, Arterial 3.2 (H) -2.0 - 3.0 mmol/L    POCT HCO3 Calculated, Arterial 28.5 (H) 22.0 - 26.0 mmol/L    POCT Hemoglobin, Arterial 13.4 (L) 13.5 - 17.5 g/dL    POCT Anion Gap, Arterial 2 (L) 10 - 25 mmo/L    Patient Temperature 37.0 degrees Celsius    FiO2 40 %    Ventilator Mode Other     Ventilator Rate 15 bpm    Tidal Volume 400 mL    Peep CHM2O 5.0 cm H2O    Site of Arterial Puncture Radial Left     Andres's Test Positive    POCT GLUCOSE   Result Value Ref Range    POCT Glucose 109 (H) 74 - 99 mg/dL   Vitamin B12   Result Value Ref Range    Vitamin B12 180 (L) 211 - 911 pg/mL   Vitamin D 25-Hydroxy,Total (for eval of Vitamin D levels)   Result Value Ref Range    Vitamin D, 25-Hydroxy, Total 37 30 - 100 ng/mL   TSH with reflex to Free T4 if abnormal   Result Value Ref Range    Thyroid Stimulating Hormone 1.56 0.44 - 3.98 mIU/L   Ammonia   Result Value Ref Range    Ammonia 26 16 - 53 umol/L   POCT GLUCOSE   Result Value Ref Range    POCT Glucose 102 (H) 74 - 99 mg/dL   POCT GLUCOSE   Result Value Ref Range    POCT Glucose 116 (H) 74 - 99 mg/dL   CBC and Auto Differential   Result Value Ref Range    WBC 10.0 4.4 - 11.3 x10*3/uL    nRBC 0.0 0.0 - 0.0 /100 WBCs    RBC 3.52 (L) 4.50 - 5.90 x10*6/uL    Hemoglobin 11.9 (L) 13.5 - 17.5 g/dL    Hematocrit 35.5 (L) 41.0 - 52.0 %     (H) 80 - 100 fL    MCH 33.8 26.0 - 34.0 pg    MCHC 33.5 32.0 - 36.0 g/dL    RDW 13.2 11.5 - 14.5 %    Platelets 137 (L) 150 - 450 x10*3/uL    Neutrophils % 73.4 40.0 - 80.0 %    Immature Granulocytes %, Automated 0.4 0.0 - 0.9 %    Lymphocytes % 16.5 13.0 - 44.0 %    Monocytes % 6.7 2.0 - 10.0 %    Eosinophils %  2.6 0.0 - 6.0 %    Basophils % 0.4 0.0 - 2.0 %    Neutrophils Absolute 7.37 1.20 - 7.70 x10*3/uL    Immature Granulocytes Absolute, Automated 0.04 0.00 - 0.70 x10*3/uL    Lymphocytes Absolute 1.66 1.20 - 4.80 x10*3/uL    Monocytes Absolute 0.67 0.10 - 1.00 x10*3/uL    Eosinophils Absolute 0.26 0.00 - 0.70 x10*3/uL    Basophils Absolute 0.04 0.00 - 0.10 x10*3/uL   Comprehensive Metabolic Panel   Result Value Ref Range    Glucose 112 (H) 74 - 99 mg/dL    Sodium 143 136 - 145 mmol/L    Potassium 3.5 3.5 - 5.3 mmol/L    Chloride 111 (H) 98 - 107 mmol/L    Bicarbonate 27 21 - 32 mmol/L    Anion Gap 9 (L) 10 - 20 mmol/L    Urea Nitrogen 10 6 - 23 mg/dL    Creatinine 0.92 0.50 - 1.30 mg/dL    eGFR >90 >60 mL/min/1.73m*2    Calcium 8.2 (L) 8.6 - 10.3 mg/dL    Albumin 3.1 (L) 3.4 - 5.0 g/dL    Alkaline Phosphatase 43 33 - 136 U/L    Total Protein 5.8 (L) 6.4 - 8.2 g/dL    AST 14 9 - 39 U/L    Bilirubin, Total 0.3 0.0 - 1.2 mg/dL    ALT 8 (L) 10 - 52 U/L   Magnesium   Result Value Ref Range    Magnesium 1.84 1.60 - 2.40 mg/dL   Phosphorus   Result Value Ref Range    Phosphorus 3.9 2.5 - 4.9 mg/dL   Blood Gas Arterial Full Panel   Result Value Ref Range    POCT pH, Arterial 7.36 (L) 7.38 - 7.42 pH    POCT pCO2, Arterial 51 (H) 38 - 42 mm Hg    POCT pO2, Arterial 127 (H) 85 - 95 mm Hg    POCT SO2, Arterial 100 94 - 100 %    POCT Oxy Hemoglobin, Arterial 97.1 94.0 - 98.0 %    POCT Hematocrit Calculated, Arterial 37.0 (L) 41.0 - 52.0 %    POCT Sodium, Arterial 137 136 - 145 mmol/L    POCT Potassium, Arterial 3.5 3.5 - 5.3 mmol/L    POCT Chloride, Arterial 111 (H) 98 - 107 mmol/L    POCT Ionized Calcium, Arterial 1.23 1.10 - 1.33 mmol/L    POCT Glucose, Arterial 111 (H) 74 - 99 mg/dL    POCT Lactate, Arterial 0.5 0.4 - 2.0 mmol/L    POCT Base Excess, Arterial 2.5 -2.0 - 3.0 mmol/L    POCT HCO3 Calculated, Arterial 28.8 (H) 22.0 - 26.0 mmol/L    POCT Hemoglobin, Arterial 12.2 (L) 13.5 - 17.5 g/dL    POCT Anion Gap, Arterial 1 (L)  10 - 25 mmo/L    Patient Temperature 37.0 degrees Celsius    FiO2 40 %    Apparatus      Ventilator Mode      Ventilator Rate 15 bpm    Tidal Volume 400 mL    Peep CHM2O 5.0 cm H2O    Site of Arterial Puncture Radial Left     Andres's Test Positive    POCT GLUCOSE   Result Value Ref Range    POCT Glucose 115 (H) 74 - 99 mg/dL   Blood Gas Venous   Result Value Ref Range    POCT pH, Venous 7.37 7.33 - 7.43 pH    POCT pCO2, Venous 45 41 - 51 mm Hg    POCT pO2, Venous 77 (H) 35 - 45 mm Hg    POCT SO2, Venous 96 (H) 45 - 75 %    POCT Oxy Hemoglobin, Venous 93.3 (H) 45.0 - 75.0 %    POCT Base Excess, Venous 0.3 -2.0 - 3.0 mmol/L    POCT HCO3 Calculated, Venous 26.0 22.0 - 26.0 mmol/L    Patient Temperature 37.0 degrees Celsius    FiO2 40 %   POCT GLUCOSE   Result Value Ref Range    POCT Glucose 131 (H) 74 - 99 mg/dL   POCT GLUCOSE   Result Value Ref Range    POCT Glucose 207 (H) 74 - 99 mg/dL   POCT GLUCOSE   Result Value Ref Range    POCT Glucose 109 (H) 74 - 99 mg/dL   Comprehensive Metabolic Panel   Result Value Ref Range    Glucose 101 (H) 74 - 99 mg/dL    Sodium 142 136 - 145 mmol/L    Potassium 3.6 3.5 - 5.3 mmol/L    Chloride 112 (H) 98 - 107 mmol/L    Bicarbonate 25 21 - 32 mmol/L    Anion Gap 9 (L) 10 - 20 mmol/L    Urea Nitrogen 11 6 - 23 mg/dL    Creatinine 0.67 0.50 - 1.30 mg/dL    eGFR >90 >60 mL/min/1.73m*2    Calcium 8.4 (L) 8.6 - 10.3 mg/dL    Albumin 3.1 (L) 3.4 - 5.0 g/dL    Alkaline Phosphatase 41 33 - 136 U/L    Total Protein 5.6 (L) 6.4 - 8.2 g/dL    AST 15 9 - 39 U/L    Bilirubin, Total 0.4 0.0 - 1.2 mg/dL    ALT 8 (L) 10 - 52 U/L   Magnesium   Result Value Ref Range    Magnesium 2.05 1.60 - 2.40 mg/dL   Phosphorus   Result Value Ref Range    Phosphorus 3.7 2.5 - 4.9 mg/dL   Lipid panel   Result Value Ref Range    Cholesterol 107 0 - 199 mg/dL    HDL-Cholesterol 43.7 mg/dL    Cholesterol/HDL Ratio 2.4     LDL Calculated 46 <=99 mg/dL    VLDL 18 0 - 40 mg/dL    Triglycerides 89 0 - 149 mg/dL    Non HDL  Cholesterol 63 0 - 149 mg/dL   CBC and Auto Differential   Result Value Ref Range    WBC 10.0 4.4 - 11.3 x10*3/uL    nRBC 0.0 0.0 - 0.0 /100 WBCs    RBC 3.50 (L) 4.50 - 5.90 x10*6/uL    Hemoglobin 11.9 (L) 13.5 - 17.5 g/dL    Hematocrit 35.4 (L) 41.0 - 52.0 %     (H) 80 - 100 fL    MCH 34.0 26.0 - 34.0 pg    MCHC 33.6 32.0 - 36.0 g/dL    RDW 12.8 11.5 - 14.5 %    Platelets 131 (L) 150 - 450 x10*3/uL    Neutrophils % 70.2 40.0 - 80.0 %    Immature Granulocytes %, Automated 0.6 0.0 - 0.9 %    Lymphocytes % 19.0 13.0 - 44.0 %    Monocytes % 7.1 2.0 - 10.0 %    Eosinophils % 2.6 0.0 - 6.0 %    Basophils % 0.5 0.0 - 2.0 %    Neutrophils Absolute 7.02 1.20 - 7.70 x10*3/uL    Immature Granulocytes Absolute, Automated 0.06 0.00 - 0.70 x10*3/uL    Lymphocytes Absolute 1.90 1.20 - 4.80 x10*3/uL    Monocytes Absolute 0.71 0.10 - 1.00 x10*3/uL    Eosinophils Absolute 0.26 0.00 - 0.70 x10*3/uL    Basophils Absolute 0.05 0.00 - 0.10 x10*3/uL   Hemoglobin A1c   Result Value Ref Range    Hemoglobin A1C 4.8 See comment %    Estimated Average Glucose 91 Not Established mg/dL   POCT GLUCOSE   Result Value Ref Range    POCT Glucose 103 (H) 74 - 99 mg/dL   Transthoracic Echo Complete   Result Value Ref Range    AV pk mono 1.06 m/s    LVOT diam 2.00 cm    AV mn grad 2 mmHg    MV E/A ratio 0.76     Tricuspid annular plane systolic excursion 1.7 cm    LV Biplane EF 48 %    LA vol index A/L 11.9 ml/m2    LV EF 43 %    RV free wall pk S' 14.60 cm/s    RVSP 23.1 mmHg    LVIDd 5.66 cm    AV pk grad 4 mmHg    Aortic Valve Area by Continuity of VTI 2.03 cm2    Aortic Valve Area by Continuity of Peak Velocity 2.15 cm2    LV A4C EF 50.3    POCT GLUCOSE   Result Value Ref Range    POCT Glucose 138 (H) 74 - 99 mg/dL   POCT GLUCOSE   Result Value Ref Range    POCT Glucose 106 (H) 74 - 99 mg/dL          Imaging  XR shoulder right 2+ views  Result Date: 4/29/2025  No acute pathologic findings are identified.   MACRO: none   Signed by: Richard  Ann 4/29/2025 8:14 AM Dictation workstation:   [id number]    MR cervical spine wo IV contrast  Result Date: 4/28/2025  1. No evidence of acute abnormality. 2. Developmental central canal stenosis. 3. Multilevel degenerative cervical spondylosis as described above.   MACRO: None   Signed by: Sunil Queen 4/28/2025 7:35 PM Dictation workstation:   [id number]    MR brain wo IV contrast  Result Date: 4/28/2025  1. Recent small nonhemorrhagic infarcts involving bilateral paramedian thalami and right midbrain. This has the typical imaging appearance of an artery of [place] infarct, and correlates very well with the partial occlusion of the right posterior cerebral artery P1 segment demonstrated on the CTA study from yesterday. 2. The proximal P1 segment remains patent. I can not determine whether or not there is persistent thrombus within the distal right P1 segment. There is no evidence to suggest propagation of thrombus. 3. Normal appearance to the deep venous system, including the internal cerebral veins. 4. Elsewhere, expected age-related changes in the brain including a mild degree of chronic microvascular ischemic signal change in the white matter.     MACRO: Richard Bishop discussed the significance and urgency of this critical finding by telephone with  DOV CELAYA on 4/28/2025 at 5:20 pm.  (**-RCF-**) Findings:  See findings.   Signed by: Richard Bishop 4/28/2025 5:21 PM Dictation workstation:   [id number]    XR chest 1 view  Result Date: 4/27/2025  Satisfactory positioning of the endotracheal tube and nasogastric tube.   Postoperative findings without acute cardiopulmonary process.   Signed by: Abbey Peterson 4/27/2025 4:25 PM Dictation workstation:   [id number]    XR chest 1 view  Result Date: 4/27/2025  Support devices as above. No focal infiltrate.   MACRO: None.   Signed by: Obdulia Michaud 4/27/2025 12:09 PM Dictation workstation:   [id number]    CT brain attack angio head and neck W  and WO IV contrast  Result Date: 4/27/2025  Findings suggesting potential thrombosis rather than hypoplasia of the right P1 posterior cerebral artery with distal reconstitution via a small right posterior communicating artery. Diminutive and thready appearance of the right distal vertebral artery beyond the right V3 V4 junction. Please correlate clinically with presenting complaints and consider MRI/MRA for instance for further evaluation if clinically indicated.   MACRO: Ismael Crowley discussed the significance and urgency of this critical finding by telephone with  Dr. Castro on 4/27/2025 at 10:49 am. (**-RCF-**) Findings:  See findings.   Signed by: Ismael Crowley 4/27/2025 10:59 AM Dictation workstation:   [id number]    CT brain attack head wo IV contrast  Result Date: 4/27/2025  No CT evidence of acute large vessel territory infarct or intracranial hemorrhage. Please correlate with the separately reported CT angiogram and consider MRI as warranted.   MACRO: Salazar Ko discussed the significance and urgency of this critical finding by Epic secure chat with  ZAKIA ALEXANDRE on 4/27/2025 at 10:40 am.  (**-RCF-**) Findings:  See findings.   Signed by: Salazar Ko 4/27/2025 10:57 AM Dictation workstation:   [id number]      Cardiology, Vascular, and Other Imaging  Transthoracic Echo Complete  Result Date: 4/29/2025           [place] [address]            Phone [phone] TRANSTHORACIC ECHOCARDIOGRAM REPORT Patient Name:       MICHEAL DIANNE Pierre Physician:    [id number] Sunil Duron DO Study Date:         4/29/2025            Ordering Provider:    [id number] ROSHNI MARIA MRN/PID:            [id number]             Fellow: Accession#:         [id number]         Nurse: Date of Birth/Age:  1960 / 64 years Sonographer:          Delma Ruiz                                                                 RDCS Gender Assigned at  M                    Additional Staff: Birth: Height:             170.18 cm            Admit Date: Weight:             61.69 kg             Admission Status:     Inpatient -                                                                Routine BSA / BMI:          1.72 m2 / 21.30      Department Location:  [facility]                     kg/m2 Blood Pressure: 139 /54 mmHg Study Type:    TRANSTHORACIC ECHO (TTE) COMPLETE Diagnosis/ICD: 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 Pertinent History: Chest Pain, HTN, CAD, CHF, CVA and A-Fib. vt, mi, pain, fall,                    rib fx, ams, resp failure, cabgbubble ro embolic source. Study Detail: The following Echo studies were performed: 2D, M-Mode, Doppler and               color flow. Technically challenging study due to prominent lung               artifact. Definity used as a contrast agent for endocardial border               definition. Total contrast used for this procedure was 3 mL via IV               push.  PHYSICIAN INTERPRETATION: Left Ventricle: The left ventricular systolic function is mildly decreased, with a visually estimated ejection fraction of 40-45%. There is global hypokinesis of the left ventricle with minor regional variations. The left ventricular cavity size is normal. There is normal septal and normal posterior left ventricular wall thickness. Left ventricular diastolic filling was indeterminate. Left Atrium: The left atrial size is normal. Right Ventricle: The right ventricle is normal in size. There is normal right ventricular global systolic function. Right Atrium: The right atrial size is normal. Aortic Valve: The aortic valve is trileaflet. The aortic valve  dimensionless index is 0.65. There is no evidence of aortic valve regurgitation. The peak instantaneous gradient of the aortic valve is 4 mmHg. The mean gradient of the aortic valve is 2 mmHg. Mitral Valve: The mitral valve is normal in structure. There is no evidence of mitral valve regurgitation. Tricuspid Valve: The tricuspid valve is structurally normal. No evidence of tricuspid regurgitation. Pulmonic Valve: The pulmonic valve is not well visualized. The pulmonic valve regurgitation was not well visualized. Pericardium: No pericardial effusion noted. Aorta: The aortic root is normal.  CONCLUSIONS:  1. The left ventricular systolic function is mildly decreased, with a visually estimated ejection fraction of 40-45%.  2. There is global hypokinesis of the left ventricle with minor regional variations.  3. Left ventricular diastolic filling was indeterminate.  4. There is normal right ventricular global systolic function.  5. No intracardiac thrombus or mass visualized. QUANTITATIVE DATA SUMMARY:  2D MEASUREMENTS:             Normal Ranges: LAs:             2.80 cm     (2.7-4.0cm) IVSd:            0.71 cm     (0.6-1.1cm) LVPWd:           0.82 cm     (0.6-1.1cm) LVIDd:           5.66 cm     (3.9-5.9cm) LV Mass Index:   92.0 g/m2 LVEDV Index:     67.27 ml/m2  LEFT ATRIUM:                  Normal Ranges: LA Vol A4C:        17.5 ml    (22+/-6mL/m2) LA Vol A2C:        23.6 ml LA Vol BP:         20.5 ml LA Vol Index A4C:  10.2ml/m2 LA Vol Index A2C:  13.8 ml/m2 LA Vol Index BP:   11.9 ml/m2 LA Area A4C:       8.9 cm2 LA Area A2C:       10.4 cm2 LA Major Axis A4C: 3.8 cm LA Major Axis A2C: 3.9 cm LA Volume Index:   10.7 ml/m2 LA Vol A4C:        17.7 ml LA Vol A2C:        20.2 ml LA Vol Index BSA:  11.0 ml/m2  LV SYSTOLIC FUNCTION:                      Normal Ranges: EF-A4C View:    50 % (>=55%) EF-A2C View:    45 % EF-Biplane:     48 % EF-Visual:      43 % LV EF Reported: 43 %  LV DIASTOLIC FUNCTION:           Normal  Ranges: MV Peak E:             0.50 m/s  (0.7-1.2 m/s) MV Peak A:             0.65 m/s  (0.42-0.7 m/s) E/A Ratio:             0.76      (1.0-2.2) MV e'                  0.076 m/s (>8.0) MV lateral e'          0.08 m/s MV medial e'           0.08 m/s E/e' Ratio:            6.57      (<8.0) a'                     0.08 m/s  MITRAL VALVE:          Normal Ranges: MV DT:        268 msec (150-240msec)  AORTIC VALVE:                     Normal Ranges: AoV Vmax:                1.06 m/s (<=1.7m/s) AoV Peak P.5 mmHg (<20mmHg) AoV Mean P.0 mmHg (1.7-11.5mmHg) LVOT Max Andres:            0.72 m/s (<=1.1m/s) AoV VTI:                 20.70 cm (18-25cm) LVOT VTI:                13.40 cm LVOT Diameter:           2.00 cm  (1.8-2.4cm) AoV Area, VTI:           2.03 cm2 (2.5-5.5cm2) AoV Area,Vmax:           2.15 cm2 (2.5-4.5cm2) AoV Dimensionless Index: 0.65  RIGHT VENTRICLE: TAPSE: 16.9 mm RV s'  0.15 m/s  TRICUSPID VALVE/RVSP:          Normal Ranges: Peak TR Velocity:     2.24 m/s RV Syst Pressure:     23 mmHg  (< 30mmHg) IVC Diam:             1.73 cm  PULMONIC VALVE:          Normal Ranges: PV Accel Time:  45 msec  (>120ms) PV Max Andres:     1.1 m/s  (0.6-0.9m/s) PV Max P.8 mmHg  [id number] Sunil Duron DO Electronically signed on 2025 at 3:09:22 PM  ** Final **     ECG 12 lead  Result Date: 2025  Normal sinus rhythm with sinus arrhythmia T wave abnormality, consider inferior ischemia QTcB >= 480 msec Abnormal ECG No previous ECGs available         Assessment/Plan   Assessment & Plan  AMS (altered mental status)    64-year-old man is identified to have 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  This is a top of the basilar artery infarct which is thought to likely be thrombogenic in etiology in most cases.  Embolism cannot be entirely ruled out but his last common.  He is fortunate to not have a locked-in syndrome.  These findings and interpretation were  discussed at length with the patient and his family and friends at bedside.  He continues to have vertical gaze restriction as well as diplopia.  These are expected to likely resolve to some extent with further visual rehab.  He is to take aspirin Plavix and statin upon discharge and follow-up with me or one of my partners in the next 4 weeks.       I personally spent 44 minutes today, exclusive of procedures, providing care for this patient, including preparation, face to face time, documentation and other services such as review of medical records, diagnostic result, patient education, counseling, coordination of care as specified in the encounter.          [1] aspirin, 81 mg, orogastric tube, Daily  atorvastatin, 80 mg, orogastric tube, Nightly  clopidogrel, 75 mg, oral, Daily  cyanocobalamin, 100 mcg, oral, Daily  enoxaparin, 40 mg, subcutaneous, Daily  ezetimibe, 10 mg, oral, Daily  insulin lispro, 0-5 Units, subcutaneous, Before meals & nightly  losartan, 25 mg, oral, Daily  [START ON 4/30/2025] metoprolol succinate XL, 100 mg, oral, Daily  oxygen, , inhalation, Continuous - Inhalation  psyllium, 1 packet, oral, Daily  [2]    [3] PRN medications: acetaminophen **OR** acetaminophen, dextrose, dextrose, glucagon, glucagon, methocarbamol

## 2025-04-30 NOTE — PROGRESS NOTES
04/30/25 1043   Discharge Planning   Who is requesting discharge planning? Provider   Home or Post Acute Services Post acute facilities (Rehab/SNF/etc)   Type of Post Acute Facility Services Skilled nursing   Expected Discharge Disposition SNF   Does the patient need discharge transport arranged? Yes   RoundTrip coordination needed? Yes   Has discharge transport been arranged? No     TCC spoke to patient and daughter at bedside. Pt's dtr is in the process of assisting patient to apply for medicaid. PT/OT skilled high intensity. Explained to patient [facility] facilities will not take medicaid pending. If patient qualifies for medicaid pending he can go to SNF. Gave SNF list to patient and dtr to review for choices. Pt will most likely have to stay at SNF for 30 days until medicaid is approved. Explained this to patient also. TCC will follow.     DISCHARGE PLAN NOT SECURE.   DO NOT DISCHARGE WITHOUT SPEAKING TO CARE COORDINATION.

## 2025-04-30 NOTE — PROGRESS NOTES
Physical Therapy    Physical Therapy Treatment    Patient Name: Mateo Woodall  MRN: [id number]  Department: [address]  Room: 17/[facility]  Today's Date: 4/30/2025  Time Calculation  Start Time: 0932  Stop Time: 1018  Time Calculation (min): 46 min         Assessment/Plan   PT Assessment  Rehab Prognosis: Good  Barriers to Discharge Home: Caregiver assistance, Physical needs  Caregiver Assistance: Caregiver assistance needed per identified barriers - however, level of patient's required assistance exceeds assistance available at home  Physical Needs: Ambulating household distances limited by function/safety, 24hr mobility assistance needed, Intermittent ADL assistance needed, High falls risk due to function or environment  End of Session Communication: Bedside nurse  Assessment Comment: Pt demos improved gait pattern this date but still requires Pierre for safety throughout. Pt would continue to benefit from skilled therapy services to address functional deficits for return to PLOF.  End of Session Patient Position: Up in chair, Alarm on  PT Plan  Inpatient/Swing Bed or Outpatient: Inpatient  PT Plan  Treatment/Interventions: Bed mobility, Transfer training, Gait training, Balance training, Neuromuscular re-education, Endurance training, Therapeutic exercise, Therapeutic activity  PT Plan: Ongoing PT  PT Frequency: 5 times per week  PT Discharge Recommendations: High intensity level of continued care  Equipment Recommended upon Discharge: Wheeled walker  PT Recommended Transfer Status: Assist x1, Assistive device  PT - OK to Discharge: Yes      General Visit Information:   PT  Visit  PT Received On: 04/30/25  General  Reason for Referral: Impaired Mobility  Referred By: Jaciel Kline MD  Past Medical History Relevant to Rehab: HLD, HTN, CAD, CHF, a-fibCHF, PTCA, cardiac stent, MI, alcohol dependence, rib fractures  Family/Caregiver Present: Yes  Caregiver Feedback: daughter at bedside  Co-Treatment: OT  Co-Treatment  Reason: CoTx to maximize pt and caregiver safety and optimize outcomes.  Prior to Session Communication: Bedside nurse  Patient Position Received: Bed, 3 rail up, Alarm off, caregiver present  General Comment: Pt cleared for PT by nursing. Pt agreeable to PT services.    Subjective   Precautions:  Precautions  Hearing/Visual Limitations: + blurred vision, double vision, difficulty focusing, and tracking issues observed  Medical Precautions: Fall precautions  Precautions Comment: + tele      Objective   Pain:  Pain Assessment  Pain Assessment: 0-10  0-10 (Numeric) Pain Score: 0 - No pain  Cognition:  Cognition  Overall Cognitive Status: Within Functional Limits  Orientation Level: Oriented X4    Postural Control:  Static Sitting Balance  Static Sitting-Balance Support: Bilateral upper extremity supported, Feet supported  Static Sitting-Level of Assistance: Distant supervision  Static Sitting-Comment/Number of Minutes: good seated static balance  Static Standing Balance  Static Standing-Balance Support: Bilateral upper extremity supported  Static Standing-Level of Assistance: Contact guard  Static Standing-Comment/Number of Minutes: fair + static stand balance     Treatments:  Balance/Neuromuscular Re-Education  Balance/Neuromuscular Re-Education Activity Performed: Yes  Balance/Neuromuscular Re-Education Activity 1: Smooth pursuits with target sticks: vertical & horizontal x5. Cues for correct sequencing and rest breaks to minimize eye strain. Family education on how to perform.    Bed Mobility  Bed Mobility: Yes  Bed Mobility 1  Bed Mobility 1: Supine to sitting  Level of Assistance 1: Contact guard  Bed Mobility Comments 1: HOB elevated, use of bedrails. Increased time to clear BLE and bring hips to EOB.    Ambulation/Gait Training  Ambulation/Gait Training Performed: Yes  Ambulation/Gait Training 1  Surface 1: Level tile  Device 1: Rolling walker  Assistance 1: Minimum assistance  Quality of Gait 1: Decreased step  length, Diminished heel strike  Comments/Distance (ft) 1: 15 feet x2. Pierre for navigation d/t vision deficits with light AD mgmt and room set up. Slow tucker with reciprocal gait.  Transfers  Transfer: Yes  Transfer 1  Transfer From 1: Sit to, Stand to  Transfer to 1: Sit, Stand  Technique 1: Stand to sit, Sit to stand  Transfer Device 1: Walker  Transfer Level of Assistance 1: Minimum assistance  Trials/Comments 1: Pierre for stabilization upon standing. Cues for safe hand placement and eccentric control.    Outcome Measures:  [facility] Basic Mobility  Turning from your back to your side while in a flat bed without using bedrails: A little  Moving from lying on your back to sitting on the side of a flat bed without using bedrails: A little  Moving to and from bed to chair (including a wheelchair): A little  Standing up from a chair using your arms (e.g. wheelchair or bedside chair): A little  To walk in hospital room: A little  Climbing 3-5 steps with railing: Total  Basic Mobility - Total Score: 16    Education Documentation  Mobility Training, taught by Eileen Whitley PTA at 4/30/2025 11:01 AM.  Learner: Family, Patient  Readiness: Eager  Method: Explanation, Demonstration  Response: Verbalizes Understanding, Needs Reinforcement  Comment: POC, visual HEP, fall prevention, calling for assist.    Education Comments  No comments found.        Encounter Problems       Encounter Problems (Active)       PT STG Problem       Patient will roll right and left with independent assist to facilitate mobility. (Progressing)       Start:  04/29/25    Expected End:  05/15/25            Patient will transfer supine to sit and sit to supine with independent assist to facilitate mobility. (Progressing)       Start:  04/29/25    Expected End:  05/15/25            Patient will transfer sit to stand and stand to sit with independent assist to facilitate mobility. (Progressing)       Start:  04/29/25    Expected End:  05/15/25             Patient will transfer bed to chair and chair to bed with independent assist to facilitate mobility. (Progressing)       Start:  04/29/25    Expected End:  05/15/25            Patient will amb 150 feet rolling walker device including two turns on even surface with independent assist to facilitate safe mobility.   (Progressing)       Start:  04/29/25    Expected End:  05/15/25

## 2025-04-30 NOTE — PROGRESS NOTES
"Mateo Woodall is a 64 y.o. male on day 3 of admission presenting with AMS (altered mental status).    Subjective   Patient up to chair with daughter at bedside. He just finished working with physical therapy.        Objective     Physical Exam  Vitals reviewed.   HENT:      Head: Normocephalic and atraumatic.   Cardiovascular:      Rate and Rhythm: Normal rate and regular rhythm.      Heart sounds: No murmur heard.     No friction rub. No gallop.   Pulmonary:      Effort: Pulmonary effort is normal.      Breath sounds: Normal breath sounds. No wheezing, rhonchi or rales.      Comments: No conversational dyspnea noted.   Abdominal:      General: Bowel sounds are normal.      Palpations: Abdomen is soft.   Musculoskeletal:      Right lower leg: No edema.      Left lower leg: No edema.   Skin:     General: Skin is warm and dry.   Neurological:      Mental Status: He is alert and oriented to person, place, and time.   Psychiatric:         Mood and Affect: Mood normal.         Behavior: Behavior normal.         Last Recorded Vitals  Blood pressure 136/75, pulse 87, temperature 36.7 °C (98.1 °F), temperature source Temporal, resp. rate 16, height 1.702 m (5' 7\"), weight 62.2 kg (137 lb 2 oz), SpO2 94%.  Intake/Output last 3 Shifts:  I/O last 3 completed shifts:  In: 300 (4.8 mL/kg) [P.O.:300]  Out: 900 (14.5 mL/kg) [Urine:900 (0.4 mL/kg/hr)]  Weight: 62.2 kg     Relevant Results  Results for orders placed or performed during the hospital encounter of 04/27/25 (from the past 24 hours)   POCT GLUCOSE   Result Value Ref Range    POCT Glucose 138 (H) 74 - 99 mg/dL   POCT GLUCOSE   Result Value Ref Range    POCT Glucose 106 (H) 74 - 99 mg/dL   CBC and Auto Differential   Result Value Ref Range    WBC 8.3 4.4 - 11.3 x10*3/uL    nRBC 0.0 0.0 - 0.0 /100 WBCs    RBC 3.51 (L) 4.50 - 5.90 x10*6/uL    Hemoglobin 11.8 (L) 13.5 - 17.5 g/dL    Hematocrit 35.2 (L) 41.0 - 52.0 %     80 - 100 fL    MCH 33.6 26.0 - 34.0 pg    MCHC " 33.5 32.0 - 36.0 g/dL    RDW 12.8 11.5 - 14.5 %    Platelets 131 (L) 150 - 450 x10*3/uL    Neutrophils % 62.1 40.0 - 80.0 %    Immature Granulocytes %, Automated 0.2 0.0 - 0.9 %    Lymphocytes % 25.4 13.0 - 44.0 %    Monocytes % 7.8 2.0 - 10.0 %    Eosinophils % 4.0 0.0 - 6.0 %    Basophils % 0.5 0.0 - 2.0 %    Neutrophils Absolute 5.18 1.20 - 7.70 x10*3/uL    Immature Granulocytes Absolute, Automated 0.02 0.00 - 0.70 x10*3/uL    Lymphocytes Absolute 2.12 1.20 - 4.80 x10*3/uL    Monocytes Absolute 0.65 0.10 - 1.00 x10*3/uL    Eosinophils Absolute 0.33 0.00 - 0.70 x10*3/uL    Basophils Absolute 0.04 0.00 - 0.10 x10*3/uL   Comprehensive Metabolic Panel   Result Value Ref Range    Glucose 96 74 - 99 mg/dL    Sodium 141 136 - 145 mmol/L    Potassium 3.8 3.5 - 5.3 mmol/L    Chloride 110 (H) 98 - 107 mmol/L    Bicarbonate 27 21 - 32 mmol/L    Anion Gap 8 (L) 10 - 20 mmol/L    Urea Nitrogen 13 6 - 23 mg/dL    Creatinine 0.66 0.50 - 1.30 mg/dL    eGFR >90 >60 mL/min/1.73m*2    Calcium 8.4 (L) 8.6 - 10.3 mg/dL    Albumin 3.2 (L) 3.4 - 5.0 g/dL    Alkaline Phosphatase 38 33 - 136 U/L    Total Protein 5.8 (L) 6.4 - 8.2 g/dL    AST 13 9 - 39 U/L    Bilirubin, Total 0.4 0.0 - 1.2 mg/dL    ALT 8 (L) 10 - 52 U/L   Magnesium   Result Value Ref Range    Magnesium 1.87 1.60 - 2.40 mg/dL   Phosphorus   Result Value Ref Range    Phosphorus 3.7 2.5 - 4.9 mg/dL   POCT GLUCOSE   Result Value Ref Range    POCT Glucose 98 74 - 99 mg/dL          Assessment & Plan  AMS (altered mental status)    CVA  Acute respiratory failure  Atherosclerotic heart disease status post CABG 9/23  Atrial fibrillation, paroxysmal  Chronic systolic heart failure  Primary hypertension  Hyperlipidemia     4/29: Extubated yesterday and clinically doing well.  Will having visual issues likely associated with the acute stroke.  They seem to be improving to a small degree at this point.  Cardiac wise appears to be well compensated.  Blood pressure now has increased to  the point where we could restart beta-blocker and ARB therapies.  Patient was on dual antiplatelet therapy with aspirin and Brilinta, but there were issues with cost of the Brilinta therapy.  I believe monotherapy with clopidogrel would be reasonable at this point in time given bypass surgery was in September 2023.  There is documented history of paroxysmal atrial fibrillation.  In that case the coagulation therapy would be recommended.  Cost of direct oral anticoagulants may also be an issue and problem as well.  Will need to investigate further.  Patient was also on atorvastatin 80 mg at home with Zetia 10 mg which I believe can safely be restarted at this point in time.  Complicated case but patient is making improvements.    4/30: As above. Patient sitting up in chair with daughter at bedside. Denies chest pain, pressure or feelings of palpitations. He is breathing comfortably on room air and appears euvolemic on exam. Blood pressures normotensive with last recorded at 136/75. He has remained in sinus rhythm on telemetry without significant ectopy. Approximately revealing a sodium of 141 potassium 3.8, creatinine 0.6-hemoglobin 11.8.  As stated above, the patient did have a brief episode of atrial fibrillation in the postoperative period after his CABG.  On my review of his outpatient cardiology notes, Dr. Salazar had recommended a Holter monitor to evaluate for atrial fibrillation burden.  At this point, have not seen any evidence of atrial fibrillation on telemetry.  I will order a 2-week Holter monitor to be placed at the time of discharge to evaluate atrial fibrillation burden and if anticoagulation would be indicated.  He continues on aspirin and Plavix per neurology recommendations.  Continues on high intensity statin with 80 mg of atorvastatin nightly. Will continue to follow with you.    Discussed this patient in detail with my collaborating physician Dr. Duron.         Jaqueline Amaral, APRN-CNP

## 2025-04-30 NOTE — PROGRESS NOTES
Micheal Woodall is a 64 y.o. male on day 3 of admission presenting with AMS (altered mental status).      Subjective   Patient denied headache or blurry vision.  He denied chest pain or shortness of breath.  Patient was transferred from ICU to stepdown unit yesterday.  Patient presented with encephalopathy, unresponsiveness required intubation.  Patient was extubated , now on room air.  MRI brain revealed acute stroke       Objective     Last Recorded Vitals  /86 (BP Location: Right arm, Patient Position: Lying)   Pulse 80   Temp 37.2 °C (99 °F) (Temporal)   Resp 17   Wt 62.2 kg (137 lb 2 oz)   SpO2 98%   Intake/Output last 3 Shifts:    Intake/Output Summary (Last 24 hours) at 4/30/2025 0752  Last data filed at 4/30/2025 043  Gross per 24 hour   Intake 100 ml   Output 800 ml   Net -700 ml       Admission Weight  Weight: 60 kg (132 lb 4.4 oz) (04/27/25 1422)    Daily Weight  04/30/25 : 62.2 kg (137 lb 2 oz)    Image Results  Transthoracic [place] [id number]             Phone [phone]    TRANSTHORACIC ECHOCARDIOGRAM REPORT    Patient Name:       MICHEAL WOODALL        Reading Physician:    [id number] [facility]  Study Date:         4/29/2025            Ordering Provider:    [id number] ROSHNI MARIA  MRN/PID:            [id number]             Fellow:  Accession#:         [id number]         Nurse:  Date of Birth/Age:  1960 / 64 years Sonographer:          Delma Ruiz RDCS  Gender Assigned at                      Additional Staff:  Birth:  Height:             170.18 cm            Admit Date:  Weight:             61.69 kg             Admission Status:     Inpatient -                                                                 Routine  BSA /  BMI:          1.72 m2 / 21.30      Department Location:  [facility] ICU                      kg/m2  Blood Pressure: 139 /54 mmHg    Study Type:    TRANSTHORACIC ECHO (TTE) COMPLETE  Diagnosis/ICD: Cerebral infarction due to embolism of left posterior cerebral                 artery-I63.432  Indication:    cva bubble r/o embolic source  CPT Codes:     Echo Complete w Full Doppler-[id number]    Patient History:  Smoker:            Former.  Pertinent History: Chest Pain, HTN, CAD, CHF, CVA and A-Fib. vt, mi, pain, fall,                     rib fx, ams, resp failure, cabgbubble ro embolic source.    Study Detail: The following Echo studies were performed: 2D, M-Mode, Doppler and                color flow. Technically challenging study due to prominent lung                artifact. Definity used as a contrast agent for endocardial border                definition. Total contrast used for this procedure was 3 mL via IV                push.       PHYSICIAN INTERPRETATION:  Left Ventricle: The left ventricular systolic function is mildly decreased, with a visually estimated ejection fraction of 40-45%. There is global hypokinesis of the left ventricle with minor regional variations. The left ventricular cavity size is normal. There is normal septal and normal posterior left ventricular wall thickness. Left ventricular diastolic filling was indeterminate.  Left Atrium: The left atrial size is normal.  Right Ventricle: The right ventricle is normal in size. There is normal right ventricular global systolic function.  Right Atrium: The right atrial size is normal.  Aortic Valve: The aortic valve is trileaflet. The aortic valve dimensionless index is 0.65. There is no evidence of aortic valve regurgitation. The peak instantaneous gradient of the aortic valve is 4 mmHg. The mean gradient of the aortic valve is 2 mmHg.  Mitral Valve: The mitral valve is normal in structure. There is no evidence of mitral valve regurgitation.  Tricuspid  Valve: The tricuspid valve is structurally normal. No evidence of tricuspid regurgitation.  Pulmonic Valve: The pulmonic valve is not well visualized. The pulmonic valve regurgitation was not well visualized.  Pericardium: No pericardial effusion noted.  Aorta: The aortic root is normal.       CONCLUSIONS:   1. The left ventricular systolic function is mildly decreased, with a visually estimated ejection fraction of 40-45%.   2. There is global hypokinesis of the left ventricle with minor regional variations.   3. Left ventricular diastolic filling was indeterminate.   4. There is normal right ventricular global systolic function.   5. No intracardiac thrombus or mass visualized.    QUANTITATIVE DATA SUMMARY:     2D MEASUREMENTS:             Normal Ranges:  LAs:             2.80 cm     (2.7-4.0cm)  IVSd:            0.71 cm     (0.6-1.1cm)  LVPWd:           0.82 cm     (0.6-1.1cm)  LVIDd:           5.66 cm     (3.9-5.9cm)  LV Mass Index:   92.0 g/m2  LVEDV Index:     67.27 ml/m2       LEFT ATRIUM:                  Normal Ranges:  LA Vol A4C:        17.5 ml    (22+/-6mL/m2)  LA Vol A2C:        23.6 ml  LA Vol BP:         20.5 ml  LA Vol Index A4C:  10.2ml/m2  LA Vol Index A2C:  13.8 ml/m2  LA Vol Index BP:   11.9 ml/m2  LA Area A4C:       8.9 cm2  LA Area A2C:       10.4 cm2  LA Major Axis A4C: 3.8 cm  LA Major Axis A2C: 3.9 cm  LA Volume Index:   10.7 ml/m2  LA Vol A4C:        17.7 ml  LA Vol A2C:        20.2 ml  LA Vol Index BSA:  11.0 ml/m2       LV SYSTOLIC FUNCTION:                       Normal Ranges:  EF-A4C View:    50 % (>=55%)  EF-A2C View:    45 %  EF-Biplane:     48 %  EF-Visual:      43 %  LV EF Reported: 43 %       LV DIASTOLIC FUNCTION:           Normal Ranges:  MV Peak E:             0.50 m/s  (0.7-1.2 m/s)  MV Peak A:             0.65 m/s  (0.42-0.7 m/s)  E/A Ratio:             0.76      (1.0-2.2)  MV e'                  0.076 m/s (>8.0)  MV lateral e'          0.08 m/s  MV medial e'           0.08  m/s  E/e' Ratio:            6.57      (<8.0)  a'                     0.08 m/s       MITRAL VALVE:          Normal Ranges:  MV DT:        268 msec (150-240msec)       AORTIC VALVE:                     Normal Ranges:  AoV Vmax:                1.06 m/s (<=1.7m/s)  AoV Peak P.5 mmHg (<20mmHg)  AoV Mean P.0 mmHg (1.7-11.5mmHg)  LVOT Max Andres:            0.72 m/s (<=1.1m/s)  AoV VTI:                 20.70 cm (18-25cm)  LVOT VTI:                13.40 cm  LVOT Diameter:           2.00 cm  (1.8-2.4cm)  AoV Area, VTI:           2.03 cm2 (2.5-5.5cm2)  AoV Area,Vmax:           2.15 cm2 (2.5-4.5cm2)  AoV Dimensionless Index: 0.65       RIGHT VENTRICLE:  TAPSE: 16.9 mm  RV s'  0.15 m/s       TRICUSPID VALVE/RVSP:          Normal Ranges:  Peak TR Velocity:     2.24 m/s  RV Syst Pressure:     23 mmHg  (< 30mmHg)  IVC Diam:             1.73 cm       PULMONIC VALVE:          Normal Ranges:  PV Accel Time:  45 msec  (>120ms)  PV Max Andres:     1.1 m/s  (0.6-0.9m/s)  PV Max P.8 mmHg       [id number] Sunil Duron   Electronically signed on 2025 at 3:09:22 PM       ** Final **  XR shoulder right 2+ views  Narrative: Interpreted By:  Richard Juarez,   STUDY:  XR SHOULDER RIGHT 2+ VIEWS; 2025 6:05 am      INDICATION:  Signs/Symptoms:acute on chronic shoulder pain, inability to lift R  arm greater than 90 degrees.      COMPARISON:  None.      ACCESSION NUMBER(S):  [id number]      ORDERING CLINICIAN:  ROSHNI MARIA      TECHNIQUE:  Right shoulder two views      FINDINGS:  No fractures or destructive lesions are identified. There is spurring  at the right AC joint.      Impression: No acute pathologic findings are identified.      MACRO:  none      Signed by: Richard Juarez 2025 8:14 AM  Dictation workstation:   [id number]      Physical Exam     General:  cooperating during physical exam.  HEENT: Pupils are equal and reactive to light and commendation , oral mucosa moist, no JVD  .  Cardiovascular: PMI nondisplaced  Lungs: Clear to auscultation bilaterally, no wheezing, no crackles, no dullness to percussion.  Abdomen: No hepatosplenomegaly appreciated, soft , not tender, positive bowel sounds, positive bowel movement.  Neuro: Alert and oriented x3, strength in upper and lower extremities , sensation intact.  Psych: Patient had great insight was going on  Musculoskeletal: No swelling in lower extremities, no limitation in range of motion.  Vascular: Pulses are intact in upper and lower extremities  Skin: No petechiae, ecchymosis or other stigmata for dermatology disease.       Assessment and plan    Acute CVA  MRI brain revealed small nonhemorrhagic infarct involving bilateral paramedian thalami and right midbrain consistent with artery of Percheron infarct.  Evaluated by Dr. Mandel from neurology services.  Continue with aspirin and Plavix, and statin.  Monitor blood pressure  PT OT evaluated patient at high intensity level of care.    Hypertension  Continue metoprolol and losartan.  Monitor close    Congestive heart failure with systolic dysfunction  From last 2D echo patient had ejection fraction 40 to 45%  Cardiology on the case    DVT prophylaxis    Coronary artery disease   Status post LIMA to LAD    Acute respiratory failure  On admission he was intubated, extubated  Now on room air  Acute respiratory failure resolved     Deconditioning  Ambulating with assistant  Fall precaution  PT OT evaluated him.  High intensity level of care.   I will discuss with care coordination on the case.    Hyperlipidemia  Continue with the statins and Zetia    Check CBC and BMP in AM.  Patient may need acute rehab.  Patient does not have insurance, I will discuss with care coordination on the case.        Yahir Gaspar MD

## 2025-04-30 NOTE — CARE PLAN
Mateo had a good night. Denied any pain. Vision had not changed, had not improved or gotten worse. He is able to make his needs known. Bedside report given to day shift RN. Pt stable at time of shift change.     The patient's goals for the shift include  get a good night sleep.     The clinical goals for the shift include remain free of falls      Problem: Fall/Injury  Goal: Not fall by end of shift  Outcome: Progressing  Goal: Be free from injury by end of the shift  Outcome: Progressing  Goal: Verbalize understanding of personal risk factors for fall in the hospital  Outcome: Progressing  Goal: Verbalize understanding of risk factor reduction measures to prevent injury from fall in the home  Outcome: Progressing  Goal: Use assistive devices by end of the shift  Outcome: Progressing  Goal: Pace activities to prevent fatigue by end of the shift  Outcome: Progressing     Problem: Pain  Goal: Takes deep breaths with improved pain control throughout the shift  Outcome: Progressing  Goal: Turns in bed with improved pain control throughout the shift  Outcome: Progressing  Goal: Walks with improved pain control throughout the shift  Outcome: Progressing  Goal: Performs ADL's with improved pain control throughout shift  Outcome: Progressing  Goal: Participates in PT with improved pain control throughout the shift  Outcome: Progressing  Goal: Free from opioid side effects throughout the shift  Outcome: Progressing  Goal: Free from acute confusion related to pain meds throughout the shift  Outcome: Progressing

## 2025-05-01 ENCOUNTER — APPOINTMENT (OUTPATIENT)
Dept: CARDIOLOGY | Facility: HOSPITAL | Age: 65
End: 2025-05-01

## 2025-05-01 VITALS
RESPIRATION RATE: 17 BRPM | SYSTOLIC BLOOD PRESSURE: 123 MMHG | OXYGEN SATURATION: 95 % | WEIGHT: 138.23 LBS | HEIGHT: 67 IN | BODY MASS INDEX: 21.7 KG/M2 | HEART RATE: 70 BPM | TEMPERATURE: 97.7 F | DIASTOLIC BLOOD PRESSURE: 69 MMHG

## 2025-05-01 LAB
ALBUMIN SERPL BCP-MCNC: 3.3 G/DL (ref 3.4–5)
ALP SERPL-CCNC: 38 U/L (ref 33–136)
ALT SERPL W P-5'-P-CCNC: 10 U/L (ref 10–52)
ANION GAP SERPL CALCULATED.3IONS-SCNC: 9 MMOL/L (ref 10–20)
AST SERPL W P-5'-P-CCNC: 13 U/L (ref 9–39)
BASOPHILS # BLD AUTO: 0.04 X10*3/UL (ref 0–0.1)
BASOPHILS NFR BLD AUTO: 0.5 %
BILIRUB SERPL-MCNC: 0.3 MG/DL (ref 0–1.2)
BODY SURFACE AREA: 1.72 M2
BUN SERPL-MCNC: 12 MG/DL (ref 6–23)
CALCIUM SERPL-MCNC: 8.9 MG/DL (ref 8.6–10.3)
CHLORIDE SERPL-SCNC: 108 MMOL/L (ref 98–107)
CO2 SERPL-SCNC: 27 MMOL/L (ref 21–32)
CREAT SERPL-MCNC: 0.7 MG/DL (ref 0.5–1.3)
EGFRCR SERPLBLD CKD-EPI 2021: >90 ML/MIN/1.73M*2
EOSINOPHIL # BLD AUTO: 0.36 X10*3/UL (ref 0–0.7)
EOSINOPHIL NFR BLD AUTO: 4.1 %
ERYTHROCYTE [DISTWIDTH] IN BLOOD BY AUTOMATED COUNT: 12.6 % (ref 11.5–14.5)
GLUCOSE BLD MANUAL STRIP-MCNC: 100 MG/DL (ref 74–99)
GLUCOSE BLD MANUAL STRIP-MCNC: 97 MG/DL (ref 74–99)
GLUCOSE SERPL-MCNC: 87 MG/DL (ref 74–99)
HCT VFR BLD AUTO: 36.6 % (ref 41–52)
HGB BLD-MCNC: 12.6 G/DL (ref 13.5–17.5)
IMM GRANULOCYTES # BLD AUTO: 0.04 X10*3/UL (ref 0–0.7)
IMM GRANULOCYTES NFR BLD AUTO: 0.5 % (ref 0–0.9)
LYMPHOCYTES # BLD AUTO: 2.07 X10*3/UL (ref 1.2–4.8)
LYMPHOCYTES NFR BLD AUTO: 23.7 %
MAGNESIUM SERPL-MCNC: 1.87 MG/DL (ref 1.6–2.4)
MCH RBC QN AUTO: 34 PG (ref 26–34)
MCHC RBC AUTO-ENTMCNC: 34.4 G/DL (ref 32–36)
MCV RBC AUTO: 99 FL (ref 80–100)
MONOCYTES # BLD AUTO: 0.7 X10*3/UL (ref 0.1–1)
MONOCYTES NFR BLD AUTO: 8 %
NEUTROPHILS # BLD AUTO: 5.53 X10*3/UL (ref 1.2–7.7)
NEUTROPHILS NFR BLD AUTO: 63.2 %
NRBC BLD-RTO: 0 /100 WBCS (ref 0–0)
PHOSPHATE SERPL-MCNC: 3.9 MG/DL (ref 2.5–4.9)
PLATELET # BLD AUTO: 147 X10*3/UL (ref 150–450)
POTASSIUM SERPL-SCNC: 3.7 MMOL/L (ref 3.5–5.3)
PROT SERPL-MCNC: 6 G/DL (ref 6.4–8.2)
RBC # BLD AUTO: 3.71 X10*6/UL (ref 4.5–5.9)
SODIUM SERPL-SCNC: 140 MMOL/L (ref 136–145)
WBC # BLD AUTO: 8.7 X10*3/UL (ref 4.4–11.3)

## 2025-05-01 PROCEDURE — 93246 EXT ECG>7D<15D RECORDING: CPT

## 2025-05-01 PROCEDURE — 2500000001 HC RX 250 WO HCPCS SELF ADMINISTERED DRUGS (ALT 637 FOR MEDICARE OP): Performed by: STUDENT IN AN ORGANIZED HEALTH CARE EDUCATION/TRAINING PROGRAM

## 2025-05-01 PROCEDURE — 2500000002 HC RX 250 W HCPCS SELF ADMINISTERED DRUGS (ALT 637 FOR MEDICARE OP, ALT 636 FOR OP/ED): Performed by: STUDENT IN AN ORGANIZED HEALTH CARE EDUCATION/TRAINING PROGRAM

## 2025-05-01 PROCEDURE — 84100 ASSAY OF PHOSPHORUS: CPT | Performed by: STUDENT IN AN ORGANIZED HEALTH CARE EDUCATION/TRAINING PROGRAM

## 2025-05-01 PROCEDURE — 36415 COLL VENOUS BLD VENIPUNCTURE: CPT | Performed by: STUDENT IN AN ORGANIZED HEALTH CARE EDUCATION/TRAINING PROGRAM

## 2025-05-01 PROCEDURE — 85025 COMPLETE CBC W/AUTO DIFF WBC: CPT | Performed by: STUDENT IN AN ORGANIZED HEALTH CARE EDUCATION/TRAINING PROGRAM

## 2025-05-01 PROCEDURE — 2500000004 HC RX 250 GENERAL PHARMACY W/ HCPCS (ALT 636 FOR OP/ED): Mod: JZ | Performed by: STUDENT IN AN ORGANIZED HEALTH CARE EDUCATION/TRAINING PROGRAM

## 2025-05-01 PROCEDURE — 99239 HOSP IP/OBS DSCHRG MGMT >30: CPT | Performed by: INTERNAL MEDICINE

## 2025-05-01 PROCEDURE — 97116 GAIT TRAINING THERAPY: CPT | Mod: GP,CQ

## 2025-05-01 PROCEDURE — 99232 SBSQ HOSP IP/OBS MODERATE 35: CPT

## 2025-05-01 PROCEDURE — 84075 ASSAY ALKALINE PHOSPHATASE: CPT | Performed by: STUDENT IN AN ORGANIZED HEALTH CARE EDUCATION/TRAINING PROGRAM

## 2025-05-01 PROCEDURE — 97530 THERAPEUTIC ACTIVITIES: CPT | Mod: GP,CQ

## 2025-05-01 PROCEDURE — 83735 ASSAY OF MAGNESIUM: CPT | Performed by: STUDENT IN AN ORGANIZED HEALTH CARE EDUCATION/TRAINING PROGRAM

## 2025-05-01 PROCEDURE — 82947 ASSAY GLUCOSE BLOOD QUANT: CPT

## 2025-05-01 PROCEDURE — 2500000005 HC RX 250 GENERAL PHARMACY W/O HCPCS: Performed by: STUDENT IN AN ORGANIZED HEALTH CARE EDUCATION/TRAINING PROGRAM

## 2025-05-01 RX ORDER — EZETIMIBE 10 MG/1
10 TABLET ORAL DAILY
Qty: 30 TABLET | Refills: 0 | Status: SHIPPED | OUTPATIENT
Start: 2025-05-01 | End: 2025-05-29 | Stop reason: SDUPTHER

## 2025-05-01 RX ORDER — CLOPIDOGREL BISULFATE 75 MG/1
75 TABLET ORAL DAILY
Qty: 30 TABLET | Refills: 0 | Status: SHIPPED | OUTPATIENT
Start: 2025-05-01 | End: 2025-05-29 | Stop reason: SDUPTHER

## 2025-05-01 RX ORDER — ATORVASTATIN CALCIUM 80 MG/1
80 TABLET, FILM COATED ORAL NIGHTLY
Qty: 30 TABLET | Refills: 0 | Status: SHIPPED | OUTPATIENT
Start: 2025-05-01 | End: 2025-05-29 | Stop reason: SDUPTHER

## 2025-05-01 RX ORDER — METOPROLOL SUCCINATE 50 MG/1
50 TABLET, EXTENDED RELEASE ORAL DAILY
Qty: 30 TABLET | Refills: 0 | Status: SHIPPED | OUTPATIENT
Start: 2025-05-01 | End: 2025-05-29 | Stop reason: SDUPTHER

## 2025-05-01 RX ORDER — LOSARTAN POTASSIUM 25 MG/1
25 TABLET ORAL DAILY
Qty: 30 TABLET | Refills: 0 | Status: SHIPPED | OUTPATIENT
Start: 2025-05-02 | End: 2025-05-29 | Stop reason: SDUPTHER

## 2025-05-01 RX ORDER — PNV NO.95/FERROUS FUM/FOLIC AC 28MG-0.8MG
100 TABLET ORAL DAILY
Start: 2025-05-01 | End: 2025-05-01

## 2025-05-01 RX ORDER — ASPIRIN 81 MG/1
TABLET ORAL
Qty: 30 TABLET | Refills: 0 | Status: SHIPPED | OUTPATIENT
Start: 2025-05-01 | End: 2025-05-31

## 2025-05-01 RX ORDER — CLOPIDOGREL BISULFATE 75 MG/1
75 TABLET ORAL DAILY
Start: 2025-05-01 | End: 2025-05-01

## 2025-05-01 RX ORDER — LOSARTAN POTASSIUM 100 MG/1
100 TABLET ORAL DAILY
Qty: 30 TABLET | Refills: 0 | Status: SHIPPED | OUTPATIENT
Start: 2025-05-01 | End: 2025-05-01 | Stop reason: HOSPADM

## 2025-05-01 RX ORDER — ACETAMINOPHEN 160 MG/5ML
1000 SUSPENSION ORAL EVERY 6 HOURS PRN
Start: 2025-05-01 | End: 2025-05-06

## 2025-05-01 RX ORDER — PNV NO.95/FERROUS FUM/FOLIC AC 28MG-0.8MG
100 TABLET ORAL DAILY
Qty: 30 TABLET | Refills: 0 | Status: SHIPPED | OUTPATIENT
Start: 2025-05-01 | End: 2025-05-31

## 2025-05-01 RX ADMIN — ASPIRIN 81 MG: 81 TABLET, CHEWABLE ORAL at 08:37

## 2025-05-01 RX ADMIN — Medication 21 PERCENT: at 08:37

## 2025-05-01 RX ADMIN — EZETIMIBE 10 MG: 10 TABLET ORAL at 08:37

## 2025-05-01 RX ADMIN — CLOPIDOGREL 75 MG: 75 TABLET ORAL at 08:37

## 2025-05-01 RX ADMIN — PSYLLIUM HUSK 1 PACKET: 3.4 POWDER ORAL at 08:37

## 2025-05-01 RX ADMIN — ENOXAPARIN SODIUM 40 MG: 40 INJECTION SUBCUTANEOUS at 08:37

## 2025-05-01 RX ADMIN — LOSARTAN POTASSIUM 25 MG: 25 TABLET, FILM COATED ORAL at 08:37

## 2025-05-01 RX ADMIN — METOPROLOL SUCCINATE 100 MG: 100 TABLET, EXTENDED RELEASE ORAL at 08:37

## 2025-05-01 RX ADMIN — VITAM B12 100 MCG: 100 TAB at 08:37

## 2025-05-01 ASSESSMENT — PAIN SCALES - GENERAL
PAINLEVEL_OUTOF10: 0 - NO PAIN
PAINLEVEL_OUTOF10: 0 - NO PAIN

## 2025-05-01 ASSESSMENT — COGNITIVE AND FUNCTIONAL STATUS - GENERAL
CLIMB 3 TO 5 STEPS WITH RAILING: A LOT
TURNING FROM BACK TO SIDE WHILE IN FLAT BAD: A LITTLE
STANDING UP FROM CHAIR USING ARMS: A LITTLE
WALKING IN HOSPITAL ROOM: A LITTLE
MOVING TO AND FROM BED TO CHAIR: A LITTLE
MOBILITY SCORE: 18

## 2025-05-01 ASSESSMENT — PAIN - FUNCTIONAL ASSESSMENT
PAIN_FUNCTIONAL_ASSESSMENT: 0-10
PAIN_FUNCTIONAL_ASSESSMENT: 0-10

## 2025-05-01 NOTE — CARE PLAN
The patient's goals for the shift include  better vision    The clinical goals for the shift include HDS, safety    Over the shift, the patient did not make progress toward the following goals. Barriers to progression include  neuro/changes in vision Recommendations to address these barriers include rest

## 2025-05-01 NOTE — CARE PLAN
The patient's goals for the shift include      The clinical goals for the shift include HDS, safety    Over the shift, the patient did not make progress toward the following goals. Barriers to progression include . Recommendations to address these barriers include .

## 2025-05-01 NOTE — PROGRESS NOTES
"Mateo Woodall is a 64 y.o. male on day 4 of admission presenting with AMS (altered mental status).    Subjective   Patient sitting up in bed eating breakfast.  No acute events overnight.  Plan for discharge to skilled nursing facility today.        Objective     Physical Exam  Vitals reviewed.   Cardiovascular:      Rate and Rhythm: Normal rate and regular rhythm.      Heart sounds: No murmur heard.     No friction rub. No gallop.   Pulmonary:      Effort: Pulmonary effort is normal.      Breath sounds: Normal breath sounds. No wheezing, rhonchi or rales.   Abdominal:      General: Bowel sounds are normal.      Palpations: Abdomen is soft.   Musculoskeletal:      Right lower leg: No edema.      Left lower leg: No edema.   Skin:     General: Skin is warm and dry.      Capillary Refill: Capillary refill takes less than 2 seconds.   Neurological:      Mental Status: He is alert and oriented to person, place, and time.   Psychiatric:         Mood and Affect: Mood normal.         Behavior: Behavior normal.         Last Recorded Vitals  Blood pressure 127/74, pulse 85, temperature 36 °C (96.8 °F), temperature source Temporal, resp. rate 17, height 1.702 m (5' 7\"), weight 62.7 kg (138 lb 3.7 oz), SpO2 93%.  Intake/Output last 3 Shifts:  I/O last 3 completed shifts:  In: 560 (8.9 mL/kg) [P.O.:560]  Out: 2125 (33.9 mL/kg) [Urine:2125 (0.9 mL/kg/hr)]  Weight: 62.7 kg     Relevant Results  Results for orders placed or performed during the hospital encounter of 04/27/25 (from the past 24 hours)   POCT GLUCOSE   Result Value Ref Range    POCT Glucose 110 (H) 74 - 99 mg/dL   POCT GLUCOSE   Result Value Ref Range    POCT Glucose 125 (H) 74 - 99 mg/dL   POCT GLUCOSE   Result Value Ref Range    POCT Glucose 111 (H) 74 - 99 mg/dL   CBC and Auto Differential   Result Value Ref Range    WBC 8.7 4.4 - 11.3 x10*3/uL    nRBC 0.0 0.0 - 0.0 /100 WBCs    RBC 3.71 (L) 4.50 - 5.90 x10*6/uL    Hemoglobin 12.6 (L) 13.5 - 17.5 g/dL    Hematocrit " 36.6 (L) 41.0 - 52.0 %    MCV 99 80 - 100 fL    MCH 34.0 26.0 - 34.0 pg    MCHC 34.4 32.0 - 36.0 g/dL    RDW 12.6 11.5 - 14.5 %    Platelets 147 (L) 150 - 450 x10*3/uL    Neutrophils % 63.2 40.0 - 80.0 %    Immature Granulocytes %, Automated 0.5 0.0 - 0.9 %    Lymphocytes % 23.7 13.0 - 44.0 %    Monocytes % 8.0 2.0 - 10.0 %    Eosinophils % 4.1 0.0 - 6.0 %    Basophils % 0.5 0.0 - 2.0 %    Neutrophils Absolute 5.53 1.20 - 7.70 x10*3/uL    Immature Granulocytes Absolute, Automated 0.04 0.00 - 0.70 x10*3/uL    Lymphocytes Absolute 2.07 1.20 - 4.80 x10*3/uL    Monocytes Absolute 0.70 0.10 - 1.00 x10*3/uL    Eosinophils Absolute 0.36 0.00 - 0.70 x10*3/uL    Basophils Absolute 0.04 0.00 - 0.10 x10*3/uL   Comprehensive Metabolic Panel   Result Value Ref Range    Glucose 87 74 - 99 mg/dL    Sodium 140 136 - 145 mmol/L    Potassium 3.7 3.5 - 5.3 mmol/L    Chloride 108 (H) 98 - 107 mmol/L    Bicarbonate 27 21 - 32 mmol/L    Anion Gap 9 (L) 10 - 20 mmol/L    Urea Nitrogen 12 6 - 23 mg/dL    Creatinine 0.70 0.50 - 1.30 mg/dL    eGFR >90 >60 mL/min/1.73m*2    Calcium 8.9 8.6 - 10.3 mg/dL    Albumin 3.3 (L) 3.4 - 5.0 g/dL    Alkaline Phosphatase 38 33 - 136 U/L    Total Protein 6.0 (L) 6.4 - 8.2 g/dL    AST 13 9 - 39 U/L    Bilirubin, Total 0.3 0.0 - 1.2 mg/dL    ALT 10 10 - 52 U/L   Magnesium   Result Value Ref Range    Magnesium 1.87 1.60 - 2.40 mg/dL   Phosphorus   Result Value Ref Range    Phosphorus 3.9 2.5 - 4.9 mg/dL   POCT GLUCOSE   Result Value Ref Range    POCT Glucose 100 (H) 74 - 99 mg/dL             Assessment & Plan  AMS (altered mental status)    CVA  Acute respiratory failure  Atherosclerotic heart disease status post CABG 9/23  Atrial fibrillation, paroxysmal  Chronic systolic heart failure  Primary hypertension  Hyperlipidemia     4/29: Extubated yesterday and clinically doing well.  Will having visual issues likely associated with the acute stroke.  They seem to be improving to a small degree at this point.   Cardiac wise appears to be well compensated.  Blood pressure now has increased to the point where we could restart beta-blocker and ARB therapies.  Patient was on dual antiplatelet therapy with aspirin and Brilinta, but there were issues with cost of the Brilinta therapy.  I believe monotherapy with clopidogrel would be reasonable at this point in time given bypass surgery was in September 2023.  There is documented history of paroxysmal atrial fibrillation.  In that case the coagulation therapy would be recommended.  Cost of direct oral anticoagulants may also be an issue and problem as well.  Will need to investigate further.  Patient was also on atorvastatin 80 mg at home with Zetia 10 mg which I believe can safely be restarted at this point in time.  Complicated case but patient is making improvements.     4/30: As above. Patient sitting up in chair with daughter at bedside. Denies chest pain, pressure or feelings of palpitations. He is breathing comfortably on room air and appears euvolemic on exam. Blood pressures normotensive with last recorded at 136/75. He has remained in sinus rhythm on telemetry without significant ectopy. Approximately revealing a sodium of 141 potassium 3.8, creatinine 0.6-hemoglobin 11.8.  As stated above, the patient did have a brief episode of atrial fibrillation in the postoperative period after his CABG.  On my review of his outpatient cardiology notes, Dr. Salazar had recommended a Holter monitor to evaluate for atrial fibrillation burden.  At this point, have not seen any evidence of atrial fibrillation on telemetry.  I will order a 2-week Holter monitor to be placed at the time of discharge to evaluate atrial fibrillation burden and if anticoagulation would be indicated.  He continues on aspirin and Plavix per neurology recommendations.  Continues on high intensity statin with 80 mg of atorvastatin nightly. Will continue to follow with you.     Discussed this patient in detail  with my collaborating physician Dr. Duron.     5/1: As above.  Patient resting comfortably in bed eating breakfast morning.  Denies chest pain, pressure or palpitations.  Continues to have visual changes, likely associated with his acute CVA.  He has remained in sinus rhythm on telemetry without significant ectopy.  Blood pressures overall normotensive last recorded 127/74.  Patient breathing comfortably on room air with pulse oximetry of 93%.  Appears euvolemic on examination.  Per neurology recommendations, the patient will continue on aspirin, Plavix and statin at the time of discharge.  As stated previously, will place a 2-week Holter monitor at the time of discharge to evaluate if there is any evidence of atrial fibrillation and I have advised the patient to follow-up with Dr. Salazar in 4 weeks to discuss Holter monitor results.  No objection from the cardiac perspective for discharge to skilled nursing facility this afternoon.      Jaqueline Amaral, APRN-CNP

## 2025-05-01 NOTE — PROGRESS NOTES
05/01/25 1237   Discharge Planning   Living Arrangements Friends   Support Systems Friends/neighbors   Expected Discharge Disposition Home   Does the patient need discharge transport arranged? No     Met with patient and his daughter at bedside.  Patient is declining SNF.  Explained to patient that [facility] is working to obtain a Medicaid pending number.  If a Medicaid number is obtained, patient could go to a Medicaid pending facility but would need to stay there for 30 days due to Medicaid rules.  Patient does not want to stay at SNF facility for 30 days.     Patient will discharge home .  Patient resides with his friends Ranjith and Luiza. Daughter will provide transportation home. Patient and daughter encouraged to call PCP to arrange HHC once Medicaid has been approved.    Dr. Gaspar notifed.

## 2025-05-01 NOTE — DISCHARGE SUMMARY
Discharge Diagnosis  AMS (altered mental status)           Issues Requiring Follow-Up acute CVA    Acute CVA  Hypertension  Coronary artery disease  Discharge Meds     Medication List      START taking these medications     acetaminophen 160 mg/5 mL (5 mL) suspension; Commonly known as: Tylenol;   Take 31.5 mL (1,000 mg) by mouth every 6 hours if needed for moderate pain   (4 - 6) for up to 5 days.   clopidogrel 75 mg tablet; Commonly known as: Plavix; Take 1 tablet (75   mg) by mouth once daily.   cyanocobalamin 100 mcg tablet; Commonly known as: Vitamin B-12; Take 1   tablet (100 mcg) by mouth once daily.     CONTINUE taking these medications     aspirin 81 mg EC tablet; TAKE 1 TABLET BY MOUTH DAILY HOLD IF GIVEN IN   LAST 24 HOURS OR IF HISTORY OF HYPERSENSITIVITY.   atorvastatin 80 mg tablet; Commonly known as: Lipitor; Take 1 tablet (80   mg) by mouth once daily at bedtime.   ezetimibe 10 mg tablet; Commonly known as: Zetia; TAKE 1 TABLET BY MOUTH   EVERY DAY   losartan 100 mg tablet; Commonly known as: Cozaar; Take 1 tablet (100   mg) by mouth once daily.   * metoprolol succinate  mg 24 hr tablet; Commonly known as:   Toprol-XL; Take 1 tablet (200 mg) by mouth once daily.   * metoprolol succinate XL 50 mg 24 hr tablet; Commonly known as:   Toprol-XL; Take 1 tablet (50 mg) by mouth once daily.   nitroglycerin 0.4 mg SL tablet; Commonly known as: Nitrostat; Take 1   tablet by mouth sublingual as needed for chest pain give up to 3 doses   hold of sbp is less than 90. Notify physician.  * This list has 2 medication(s) that are the same as other medications   prescribed for you. Read the directions carefully, and ask your doctor or   other care provider to review them with you.     STOP taking these medications     Brilinta 90 mg tablet; Generic drug: ticagrelor   traMADol 50 mg tablet; Commonly known as: Ultram   varenicline tartrate 0.5 mg (11)- 1 mg (42) tablet; Commonly known as:   Chantix BEATRIZ    varenicline tartrate 1 mg tablet; Commonly known as: Chantix       Test Results Pending At Discharge  Pending Labs       No current pending labs.            Hospital Course     Patient is a 64 years old male with past medical history of coronary artery disease, status post LIMA to LAD presented to [facility] ER with encephalopathy.  Patient was found unresponsive.  Patient was brought by EMS to ER for further evaluation and treatment.  Patient was intubated and admitted in ICU.  Code brain attack was announced.  CT brain on admission did not reveal acute large vessel territory infarct or intracranial hemorrhage.  CT angio head and neck revealed findings suggesting potential thrombosis rather than hypoplasia of posterior cerebral artery.  Patient was managed in ICU.  He was evaluated by Dr. Mandel from neurology services.  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  Patient was started on aspirin and Plavix.  He has coronary artery disease.  Dr. Duron evaluated the patient.  From last 2D echo patient had ejection fraction 40 to 45%.  Patient was extubated.  He was transferred to stepdown unit.  Patient is doing clinically well.  Discussed with cardiology on the case.  No evidence of A-fib.  Patient is in normal sinus rhythm.  Plan to discharge patient with Holter monitor.  Patient was evaluated by physical therapy.  Physical therapy recommended high intensity level of care.  Patient does not have medical insurance.  Discussed in detail with care coordination on the case.  Patient will apply for Medicaid.  Patient has to go to skilled nursing facility.  List of facilities provided to his daughter.  Patient is clinically hemodynamic stable to get discharged if facility accept him.  Patient was advised regarding compliance with medication and appointment.  I advised him to follow-up with Dr. Mandel in  2 weeks, Dr. Duron in 2 weeks and PCP in 2 weeks.  Addendum  12:55 PM  Plan was to  discharge patient to skilled nursing facility.  Patient refused to go to skilled nursing facility.  Discussed with care coordination on the case.  Patient will be discharged home.  He does not have medical insurance.  Patient applied for Medicaid number.      Pertinent Physical Exam At Time of Discharge  Physical Exam  General: In non acute distress, cooperating during physical exam.  HEENT: Pupils are equal and reactive to light and commendation , oral mucosa moist, no JVD oral mucosa is moist.  Cardiovascular: Normal sinus rhythm, no MRG.  Lungs: Clear to auscultation bilaterally, no wheezing, no crackles, no dullness to percussion.  Abdomen: No hepatosplenomegaly appreciated, soft , not tender, positive bowel sounds, positive bowel movement.  Neuro: Alert and oriented x3, strength in upper and lower extremities , sensation intact.  Psych: Patient had great insight was going on  Musculoskeletal: No swelling in lower extremities, no limitation in range of motion.  Vascular: Pulses are intact in upper and lower extremities  Skin: No petechiae, ecchymosis or other stigmata for dermatology disease.   Outpatient Follow-Up  No future appointments.    Follow-up with Dr. Mandel in 2 weeks  Follow-up with PCP in 2 weeks    Time spent discharge the patient 38 minutes    Yahir Gaspar MD

## 2025-05-01 NOTE — PROGRESS NOTES
Physical Therapy    Physical Therapy Treatment    Patient Name: Mateo Woodall  MRN: [id number]  Department:   Room: [address] [place] [address]  Today's Date: 5/1/2025  Time Calculation  Start Time: 1040  Stop Time: 1109  Time Calculation (min): 29 min         Assessment/Plan   PT Assessment  Rehab Prognosis: Good  Barriers to Discharge Home: Caregiver assistance, Physical needs  Caregiver Assistance: Caregiver assistance needed per identified barriers - however, level of patient's required assistance exceeds assistance available at home  Physical Needs: Ambulating household distances limited by function/safety, 24hr mobility assistance needed, Intermittent ADL assistance needed, High falls risk due to function or environment  End of Session Communication: Bedside nurse  Assessment Comment: Pt continues to progress towards stated goals. Able to furtrher amb distances this date with good tolerance.  End of Session Patient Position: Up in chair, Alarm off, caregiver present  PT Plan  Inpatient/Swing Bed or Outpatient: Inpatient  PT Plan  Treatment/Interventions: Bed mobility, Transfer training, Gait training, Balance training, Neuromuscular re-education, Endurance training, Therapeutic exercise, Therapeutic activity  PT Plan: Ongoing PT  PT Frequency: 5 times per week  PT Discharge Recommendations: High intensity level of continued care  Equipment Recommended upon Discharge: Wheeled walker  PT Recommended Transfer Status: Assist x1, Assistive device  PT - OK to Discharge: Yes      General Visit Information:   PT  Visit  PT Received On: 05/01/25  General  Reason for Referral: impaired ADL's/mobility, vision impairment  Referred By: Jaciel Kline MD  Past Medical History Relevant to Rehab: HLD, HTN, CAD, CHF, a-fibCHF, PTCA, cardiac stent, MI, alcohol dependence, rib fractures  Family/Caregiver Present: Yes  Caregiver Feedback: daughter at bedside  Prior to Session Communication: Bedside nurse  Patient Position Received: Up in  chair, Alarm off, caregiver present  General Comment: Pt cleared for PT by nursing. Pt agreeable to PT services.    Subjective   Precautions:  Precautions  Hearing/Visual Limitations: + blurred vision, double vision, difficulty focusing, and tracking issues observed  Medical Precautions: Fall precautions  Precautions Comment: + tele      Objective   Pain:  Pain Assessment  Pain Assessment: 0-10  0-10 (Numeric) Pain Score: 0 - No pain  Cognition:  Cognition  Overall Cognitive Status: Within Functional Limits  Orientation Level: Oriented X4    Postural Control:  Static Sitting Balance  Static Sitting-Balance Support: Bilateral upper extremity supported, Feet supported  Static Sitting-Level of Assistance: Modified independent  Static Sitting-Comment/Number of Minutes: good static seated balance  Static Standing Balance  Static Standing-Balance Support: Bilateral upper extremity supported  Static Standing-Level of Assistance: Close supervision  Static Standing-Comment/Number of Minutes: good static stand balance    Treatments:  Therapeutic Exercise  Therapeutic Exercise Performed: No    Therapeutic Activity  Therapeutic Activity Performed: Yes  Therapeutic Activity 1: Reviewed potential home going concerns. All questions from pt/daughter answered with pt/family verbalizing understanding.    Bed Mobility  Bed Mobility: Yes  Bed Mobility 1  Bed Mobility 1: Sitting to supine  Level of Assistance 1: Distant supervision  Bed Mobility Comments 1: Increased time to position self in bed, but able to perform without assist. Sup for safety.    Ambulation/Gait Training  Ambulation/Gait Training Performed: Yes  Ambulation/Gait Training 1  Surface 1: Level tile  Device 1: Rolling walker  Assistance 1: Minimum assistance  Quality of Gait 1: Decreased step length, Diminished heel strike  Comments/Distance (ft) 1: 75 feet x2. Pt able to maintain mostly straight path with minimal cues for directional/obstacle awareness. Pt c/o changing  vision throughout between double/blurred. No LOB throughout with pt benefitting from cues for proximity to walls etc to maximize safety.  Transfers  Transfer: Yes  Transfer 1  Transfer From 1: Sit to, Stand to  Transfer to 1: Stand, Sit  Technique 1: Stand to sit, Sit to stand  Transfer Device 1: Walker  Transfer Level of Assistance 1: Distant supervision  Trials/Comments 1: good safety awareness throughout with pt taking increased time to complete d/t vision defictis.    Outcome Measures:  [facility] Basic Mobility  Turning from your back to your side while in a flat bed without using bedrails: None  Moving from lying on your back to sitting on the side of a flat bed without using bedrails: A little  Moving to and from bed to chair (including a wheelchair): A little  Standing up from a chair using your arms (e.g. wheelchair or bedside chair): A little  To walk in hospital room: A little  Climbing 3-5 steps with railing: A lot  Basic Mobility - Total Score: 18    Education Documentation  No documentation found.  Education Comments  No comments found.      Encounter Problems       Encounter Problems (Active)       PT STG Problem       Patient will roll right and left with independent assist to facilitate mobility. (Progressing)       Start:  04/29/25    Expected End:  05/15/25            Patient will transfer supine to sit and sit to supine with independent assist to facilitate mobility. (Progressing)       Start:  04/29/25    Expected End:  05/15/25            Patient will transfer sit to stand and stand to sit with independent assist to facilitate mobility. (Progressing)       Start:  04/29/25    Expected End:  05/15/25            Patient will transfer bed to chair and chair to bed with independent assist to facilitate mobility. (Progressing)       Start:  04/29/25    Expected End:  05/15/25            Patient will amb 150 feet rolling walker device including two turns on even surface with independent assist to  facilitate safe mobility.   (Progressing)       Start:  04/29/25    Expected End:  05/15/25

## 2025-05-02 ENCOUNTER — PATIENT OUTREACH (OUTPATIENT)
Dept: PRIMARY CARE | Facility: CLINIC | Age: 65
End: 2025-05-02

## 2025-05-02 NOTE — PROGRESS NOTES
Discharge Facility: [facility]     Discharge Diagnosis:    AMS (altered mental status)     Issues Requiring Follow-Up acute CVA     Acute CVA  Hypertension  Coronary artery disease    Admission Date: 4/27/2025   Discharge Date:  5/1/2025     PCP Appointment Date:  Tasked to office per patient request       Specialist Appointment Date:       Follow up with Livia Mandel MD (Neurology) in 2 weeks (5/15/2025) Patient aware     Schedule an appointment with Silvio Salazar MD (Cardiology) in 1 month (5/31/2025) Patient     Hospital Encounter and Summary Linked: Yes    Admission (Discharged) with Yahir Gaspar MD (04/27/2025)     See discharge assessment below for further details     Wrap Up  Wrap Up Additional Comments: Patient states doing fine, staying with friends whom are assisting in community, request to speak with PCP office to [facility] F/U , aware med chchas along with LAYA Mandel neuro F/U to [facility] along with Dr. Salazar cards. Patient encouraged to call providers for any  questions concerns or change  in condition. (5/2/2025 10:01 AM)    Engagement  Call Start Time: 1001 (5/2/2025 10:01 AM)    Medications  Medications reviewed with patient/caregiver?: Yes (5/2/2025 10:01 AM)  Is the patient having any side effects they believe may be caused by any medication additions or changes?: No (5/2/2025 10:01 AM)  Does the patient have all medications ordered at discharge?: Yes (5/2/2025 10:01 AM)  Care Management Interventions: No intervention needed (5/2/2025 10:01 AM)  Prescription Comments: START taking these medications     acetaminophen 160 mg/5 mL (5 mL) suspension; Commonly known as: Tylenol;   Take 31.5 mL (1,000 mg) by mouth every 6 hours if needed for moderate pain   (4 - 6) for up to 5 days.   clopidogrel 75 mg tablet; Commonly known as: Plavix; Take 1 tablet (75   mg) by mouth once daily.   cyanocobalamin 100 mcg tablet; Commonly known as: Vitamin B-12; Take 1   tablet (100 mcg) by mouth once daily. (5/2/2025 10:01  AM)  Is the patient taking all medications as directed (includes completed medication regime)?: -- (pt states has meds) (5/2/2025 10:01 AM)  Care Management Interventions: Provided patient education (5/2/2025 10:01 AM)  Medication Comments: STOP taking these medications     Brilinta 90 mg tablet; Generic drug: ticagrelor   traMADol 50 mg tablet; Commonly known as: Ultram   varenicline tartrate 0.5 mg (11)- 1 mg (42) tablet; Commonly known as:   Chantix BEATRIZ   varenicline tartrate 1 mg tablet; Commonly known as: Chantix (5/2/2025 10:01 AM)    Appointments  Does the patient have a primary care provider?: Yes (5/2/2025 10:01 AM)  Care Management Interventions: Educated patient on importance of making appointment (5/2/2025 10:01 AM)  Care Management Interventions: Advised to schedule with specialist (5/2/2025 10:01 AM)    Self Management  What is the home health agency?: NA (5/2/2025 10:01 AM)  What Durable Medical Equipment (DME) was ordered?: NA (5/2/2025 10:01 AM)    Patient Teaching  Does the patient have access to their discharge instructions?: Yes (5/2/2025 10:01 AM)  Care Management Interventions: Reviewed instructions with patient (5/2/2025 10:01 AM)  What is the patient's perception of their health status since discharge?: Improving (5/2/2025 10:01 AM)  Is the patient/caregiver able to teach back the hierarchy of who to call/visit for symptoms/problems? PCP, Specialist, Home Health nurse, Urgent Care, ED, 911: Yes (5/2/2025 10:01 AM)

## 2025-05-09 ENCOUNTER — PATIENT OUTREACH (OUTPATIENT)
Dept: PRIMARY CARE | Facility: CLINIC | Age: 65
End: 2025-05-09

## 2025-05-09 NOTE — PROGRESS NOTES
Unable to reach patient for follow up call, appears no PCP visit jorgito or seen at this time     Left voicemail with call back number for patient to call if needed   If no voicemail available call attempts x 2 were made to contact the patient to assist with any questions or concerns patient may have.     L/M encouraged patient to call providers for any questions concerns or change in condition

## 2025-05-29 ENCOUNTER — OFFICE VISIT (OUTPATIENT)
Dept: PRIMARY CARE | Facility: CLINIC | Age: 65
End: 2025-05-29

## 2025-05-29 VITALS
HEIGHT: 67 IN | WEIGHT: 130 LBS | SYSTOLIC BLOOD PRESSURE: 136 MMHG | OXYGEN SATURATION: 96 % | BODY MASS INDEX: 20.4 KG/M2 | RESPIRATION RATE: 18 BRPM | TEMPERATURE: 97.5 F | DIASTOLIC BLOOD PRESSURE: 78 MMHG | HEART RATE: 79 BPM

## 2025-05-29 DIAGNOSIS — I63.9 CEREBROVASCULAR ACCIDENT (CVA), UNSPECIFIED MECHANISM (MULTI): ICD-10-CM

## 2025-05-29 DIAGNOSIS — E78.2 MIXED HYPERLIPIDEMIA: ICD-10-CM

## 2025-05-29 DIAGNOSIS — I11.9 BENIGN HYPERTENSIVE HEART DISEASE WITHOUT CONGESTIVE HEART FAILURE: ICD-10-CM

## 2025-05-29 DIAGNOSIS — F17.200 TOBACCO DEPENDENCE: Primary | ICD-10-CM

## 2025-05-29 DIAGNOSIS — I63.432 CEREBRAL INFARCTION DUE TO EMBOLISM OF LEFT POSTERIOR CEREBRAL ARTERY (MULTI): ICD-10-CM

## 2025-05-29 DIAGNOSIS — I25.10 CORONARY ARTERY DISEASE INVOLVING NATIVE CORONARY ARTERY OF NATIVE HEART WITHOUT ANGINA PECTORIS: ICD-10-CM

## 2025-05-29 DIAGNOSIS — H53.9 VISION CHANGES: ICD-10-CM

## 2025-05-29 DIAGNOSIS — I10 PRIMARY HYPERTENSION: ICD-10-CM

## 2025-05-29 PROCEDURE — 3008F BODY MASS INDEX DOCD: CPT | Performed by: NURSE PRACTITIONER

## 2025-05-29 PROCEDURE — 4004F PT TOBACCO SCREEN RCVD TLK: CPT | Performed by: NURSE PRACTITIONER

## 2025-05-29 PROCEDURE — 3075F SYST BP GE 130 - 139MM HG: CPT | Performed by: NURSE PRACTITIONER

## 2025-05-29 PROCEDURE — 3078F DIAST BP <80 MM HG: CPT | Performed by: NURSE PRACTITIONER

## 2025-05-29 PROCEDURE — 99214 OFFICE O/P EST MOD 30 MIN: CPT | Performed by: NURSE PRACTITIONER

## 2025-05-29 RX ORDER — ATORVASTATIN CALCIUM 80 MG/1
80 TABLET, FILM COATED ORAL NIGHTLY
Qty: 30 TABLET | Refills: 0 | Status: SHIPPED | OUTPATIENT
Start: 2025-05-29 | End: 2025-06-28

## 2025-05-29 RX ORDER — METOPROLOL SUCCINATE 50 MG/1
50 TABLET, EXTENDED RELEASE ORAL DAILY
Qty: 30 TABLET | Refills: 0 | Status: SHIPPED | OUTPATIENT
Start: 2025-05-29 | End: 2025-06-28

## 2025-05-29 RX ORDER — CLOPIDOGREL BISULFATE 75 MG/1
75 TABLET ORAL DAILY
Qty: 30 TABLET | Refills: 0 | Status: SHIPPED | OUTPATIENT
Start: 2025-05-29 | End: 2025-06-28

## 2025-05-29 RX ORDER — EZETIMIBE 10 MG/1
10 TABLET ORAL DAILY
Qty: 30 TABLET | Refills: 0 | Status: SHIPPED | OUTPATIENT
Start: 2025-05-29 | End: 2025-06-28

## 2025-05-29 RX ORDER — LOSARTAN POTASSIUM 25 MG/1
25 TABLET ORAL DAILY
Qty: 30 TABLET | Refills: 0 | Status: SHIPPED | OUTPATIENT
Start: 2025-05-29 | End: 2025-06-28

## 2025-05-29 ASSESSMENT — LIFESTYLE VARIABLES
HOW OFTEN DO YOU HAVE A DRINK CONTAINING ALCOHOL: 4 OR MORE TIMES A WEEK
HOW MANY STANDARD DRINKS CONTAINING ALCOHOL DO YOU HAVE ON A TYPICAL DAY: 1 OR 2
HOW OFTEN DO YOU HAVE SIX OR MORE DRINKS ON ONE OCCASION: NEVER
AUDIT-C TOTAL SCORE: 4
SKIP TO QUESTIONS 9-10: 1

## 2025-05-29 ASSESSMENT — PAIN SCALES - GENERAL: PAINLEVEL_OUTOF10: 0-NO PAIN

## 2025-05-29 NOTE — PROGRESS NOTES
"Subjective   Patient ID: Mateo Woodall is a 64 y.o. male who presents for Hospital Follow-up (PT ACCOMPANIED BY ROOMMATE FRANCIS FOR HOSPITAL FOLLOW UP.  PT HAD A STROKE IN HIS SLEEP ABOUT 5 WEEKS AGO. DISCHARGED 5/1/2025. ).    Pt here for follow up after a stroke that occuered while in his sleep. Has not been here inawhhile. Here with his long term partner, he has just lost his job, he was let go because he was unable to keep up with it, he has had a heart attack few years ago , he had no insurance, , has applied for medicaid and has applied for disability, reviwed hospitization  labs  ct mri, he was 911 he was unresponsive,  he was followed by dr grider. Pt wasdc home. .          Review of Systems   Constitutional:  Positive for fatigue.   Eyes:  Positive for discharge and visual disturbance.       Objective   /78   Pulse 79   Temp 36.4 °C (97.5 °F)   Resp 18   Ht 1.702 m (5' 7\")   Wt 59 kg (130 lb)   SpO2 96%   BMI 20.36 kg/m²     Physical Exam  Constitutional:       Appearance: Normal appearance.   HENT:      Right Ear: Tympanic membrane normal.      Left Ear: Tympanic membrane normal.      Nose: Nose normal.   Eyes:      Comments: R eye crusting noted has vision isuues  with r eye. Doing exercises they gave him , vision impaired   Cardiovascular:      Rate and Rhythm: Normal rate and regular rhythm.      Pulses: Normal pulses.      Heart sounds: Normal heart sounds.   Pulmonary:      Effort: Pulmonary effort is normal.      Breath sounds: Normal breath sounds.   Abdominal:      General: Bowel sounds are normal.   Musculoskeletal:      Cervical back: Neck supple.   Skin:     General: Skin is warm.   Neurological:      Mental Status: He is alert and oriented to person, place, and time.   Psychiatric:         Behavior: Behavior normal.         Assessment/Plan   Problem List Items Addressed This Visit           ICD-10-CM    Benign hypertensive heart disease without congestive heart failure I11.9    " Relevant Medications    losartan (Cozaar) 25 mg tablet    metoprolol succinate XL (Toprol-XL) 50 mg 24 hr tablet    clopidogrel (Plavix) 75 mg tablet    Hyperlipidemia E78.5    Relevant Medications    ezetimibe (Zetia) 10 mg tablet    Tobacco dependence - Primary F17.200    Coronary artery disease I25.10    Relevant Medications    metoprolol succinate XL (Toprol-XL) 50 mg 24 hr tablet    clopidogrel (Plavix) 75 mg tablet    atorvastatin (Lipitor) 80 mg tablet    Other Relevant Orders    Disability Placard     Other Visit Diagnoses         Codes      Cerebrovascular accident (CVA), unspecified mechanism (Multi)     I63.9    Relevant Orders    Referral to Ophthalmology      Vision changes     H53.9    Relevant Orders    Referral to Ophthalmology      Primary hypertension     I10    Relevant Medications    metoprolol succinate XL (Toprol-XL) 50 mg 24 hr tablet      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 Lewis try to get esteban car involved to [facility] with insuance  .discussed case with tanya to help with tanya for  help with cost of meds,  keep appoitments  return in a month.

## 2025-05-30 ENCOUNTER — PATIENT OUTREACH (OUTPATIENT)
Dept: PRIMARY CARE | Facility: CLINIC | Age: 65
End: 2025-05-30

## 2025-05-30 ASSESSMENT — ENCOUNTER SYMPTOMS
EYE DISCHARGE: 1
FATIGUE: 1

## 2025-05-30 NOTE — PROGRESS NOTES
I received a referral from MAGALI Jeffery on Mr. Woodall who had a recent stroke and recently lost his job  I spoke to pt today he shares that he lost his job and has no health insurance  He shares that he applied for disability and Medicaid, received a letter and it seems he was approved for both and benefits will start in August  Mateo shares that he had to pay OOP to see MAGALI Jeffery which was over $200 he cannot afford to do this again  We discussed receiving medical care at the [facility] I provided him with address and phone number  The other option is to sign up for a Health Insurance Marketplace plan (healthcare.gov) [phone].  We discussed plans are affordable based on income/assets  Mateo is able to afford all his medications accept one which will cost > $1000 but he cannot tell me the name.  He states Dr Salazar is aware he sent prescription for a substitute but still more than can afford.  He said MAGALI Jeffery put referral to  pharmacy for possible assistance  We discussed lifestyle changes.  Mateo was a 1 ppd smoker but has cut down to 1/2 ppd he would still like to further reduce.  I gave him 1 800 QUIT NOW he will receive 1 mo nicotine patch / gum  We discussed low fat low sodium diet and daily walking  Case not opened pt does not have insurance unable to afford monthly CCM outreach

## 2025-06-02 ENCOUNTER — PATIENT OUTREACH (OUTPATIENT)
Facility: CLINIC | Age: 65
End: 2025-06-02

## 2025-06-03 ENCOUNTER — APPOINTMENT (OUTPATIENT)
Dept: CARDIOLOGY | Facility: CLINIC | Age: 65
End: 2025-06-03

## 2025-06-11 ENCOUNTER — PHARMACY VISIT (OUTPATIENT)
Dept: PHARMACY | Facility: CLINIC | Age: 65
End: 2025-06-11
Payer: MEDICAID

## 2025-06-11 DIAGNOSIS — I25.10 CORONARY ARTERY DISEASE INVOLVING NATIVE CORONARY ARTERY OF NATIVE HEART WITHOUT ANGINA PECTORIS: ICD-10-CM

## 2025-06-11 DIAGNOSIS — E78.2 MIXED HYPERLIPIDEMIA: ICD-10-CM

## 2025-06-11 DIAGNOSIS — I63.432 CEREBRAL INFARCTION DUE TO EMBOLISM OF LEFT POSTERIOR CEREBRAL ARTERY (MULTI): ICD-10-CM

## 2025-06-11 DIAGNOSIS — I11.9 BENIGN HYPERTENSIVE HEART DISEASE WITHOUT CONGESTIVE HEART FAILURE: ICD-10-CM

## 2025-06-11 DIAGNOSIS — I10 PRIMARY HYPERTENSION: ICD-10-CM

## 2025-06-11 PROCEDURE — RXOTC WILLOW AMBULATORY OTC CHARGE

## 2025-06-11 PROCEDURE — RXMED WILLOW AMBULATORY MEDICATION CHARGE

## 2025-06-11 RX ORDER — ASPIRIN 81 MG/1
81 TABLET ORAL DAILY
Qty: 30 TABLET | Refills: 0 | OUTPATIENT
Start: 2025-05-01

## 2025-06-11 RX ORDER — LOSARTAN POTASSIUM 25 MG/1
25 TABLET ORAL DAILY
Qty: 90 TABLET | Refills: 1 | Status: SHIPPED | OUTPATIENT
Start: 2025-06-11 | End: 2025-09-11

## 2025-06-11 RX ORDER — ATORVASTATIN CALCIUM 80 MG/1
80 TABLET, FILM COATED ORAL NIGHTLY
Qty: 90 TABLET | Refills: 1 | Status: SHIPPED | OUTPATIENT
Start: 2025-06-11 | End: 2025-09-09

## 2025-06-11 RX ORDER — CLOPIDOGREL BISULFATE 75 MG/1
75 TABLET ORAL DAILY
Qty: 90 TABLET | Refills: 1 | Status: SHIPPED | OUTPATIENT
Start: 2025-06-11 | End: 2025-12-08

## 2025-06-11 RX ORDER — ATORVASTATIN CALCIUM 80 MG/1
80 TABLET, FILM COATED ORAL NIGHTLY
Qty: 30 TABLET | Refills: 0 | OUTPATIENT
Start: 2025-05-01

## 2025-06-11 RX ORDER — METOPROLOL SUCCINATE 50 MG/1
50 TABLET, EXTENDED RELEASE ORAL DAILY
Qty: 30 TABLET | Refills: 0 | OUTPATIENT
Start: 2025-05-01

## 2025-06-11 RX ORDER — METOPROLOL SUCCINATE 50 MG/1
50 TABLET, EXTENDED RELEASE ORAL DAILY
Qty: 90 TABLET | Refills: 0 | Status: SHIPPED | OUTPATIENT
Start: 2025-06-11 | End: 2025-09-09

## 2025-06-11 RX ORDER — EZETIMIBE 10 MG/1
10 TABLET ORAL DAILY
Qty: 90 TABLET | Refills: 1 | Status: SHIPPED | OUTPATIENT
Start: 2025-06-11 | End: 2025-09-09

## 2025-06-11 RX ORDER — EZETIMIBE 10 MG/1
10 TABLET ORAL DAILY
Qty: 30 TABLET | Refills: 0 | OUTPATIENT
Start: 2025-05-01

## 2025-06-13 ENCOUNTER — PHARMACY VISIT (OUTPATIENT)
Dept: PHARMACY | Facility: CLINIC | Age: 65
End: 2025-06-13

## 2025-06-24 ENCOUNTER — PATIENT OUTREACH (OUTPATIENT)
Dept: PRIMARY CARE | Facility: CLINIC | Age: 65
End: 2025-06-24
Payer: COMMERCIAL

## 2025-06-24 DIAGNOSIS — I63.432 CEREBRAL INFARCTION DUE TO EMBOLISM OF LEFT POSTERIOR CEREBRAL ARTERY (MULTI): ICD-10-CM

## 2025-06-24 DIAGNOSIS — E78.2 MIXED HYPERLIPIDEMIA: ICD-10-CM

## 2025-06-24 DIAGNOSIS — I10 PRIMARY HYPERTENSION: ICD-10-CM

## 2025-06-24 NOTE — PROGRESS NOTES
Patient referred to Chronic Care Management by PCP - Ernestina Jeffery, AARON-CNP   Chart reviewed.    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 Yes    Last Office Visit with PCP: 5/29/2025   Next Office Visit with PCP: Visit date not found   APC Collaboration: n/a    New Enrollment Summary:   Confirmation of two patient identifiers.  Explained that in my role as Care Manager I will:  Be a point of contact with questions and concerns  Focus on chronic conditions and achieving health goals  Make sure patient is receiving all preventative care he/she is due for    Plan of Care derived from provider's comprehensive assessment and outlined plan of care found in AWV or other follow up.  Nature and availability of the program discussed with the patient, the patient's responsibility for potential cost sharing, only one practitioner furnishing services during a calendar month, and the right to stop services at any time.  Verbal consent received to enroll.    My contact info was provided for any needs that may arise.    INITIAL CASE NOTE:  I received a referral from MAGALI Jeffery on Mr. Woodall who had a recent stroke and recently lost his job  I spoke to pt today he shares that he lost his job and has no health insurance  He shares that he applied for disability and Medicaid, received a letter and it seems he was approved for both and benefits will start in August  Mateo shares that he had to pay OOP to see MAGALI Jeffery which was over $200 he cannot afford to do this again  We discussed receiving medical care at the [facility] I provided him with address and phone number  The other option is to sign up for a Health Insurance Marketplace plan (healthcare.gov) [phone].  We discussed plans are affordable based on income/assets  Mateo is able to afford all his medications accept  one which will cost > $1000 but he cannot tell me the name.  He states Dr Salazar is aware he sent prescription for a substitute but still more than can afford.  He said MAGALI Jeffery put referral to  pharmacy for possible assistance  We discussed lifestyle changes.  Mateo was a 1 ppd smoker but has cut down to 1/2 ppd he would still like to further reduce.  I gave him 1 800 QUIT NOW he will receive 1 mo nicotine patch / gum  We discussed low fat low sodium diet and daily walking  Case not opened pt does not have insurance unable to afford monthly CCM outreach     6/24/25 I spoke to Mateo states approved for disability and medicaid.  He is now on [facility] has his card knows he can use for OP services, IP, medications.  [facility] will get his first disability check in August.  A huge weight has been lifted off of pt and now he can focus on healthy lifestyle, chronic condition education, re-stroke prevention and seeing the necessary providers. Mateo sees the ophthalmologist later today for vision changes from the stroke.  He sees Dr Salazar 7/3 1:15 for HTN, HF.  He has all medications and is taking routinely and on a schedule.      IDENTIFIED ISSUES/CONCERNS:  IP 4/27-51 SMALL NON-HEMORRHAGIC CVA WAS FOUND UNRESPONSIVE, INTUBATED  UNINSURED    PLAN:  [X] Apply for disability and medicaid  6/24/25 approved disability now on [facility]  [  ] Dr Salazar 7/3 1:15  [  ] ? Visual rehab           Medications:   Are there medication concerns that need addressed? No  Refills needed? No    Care Gaps: Deferred  General Assessment: Completed  Social Drivers of Health: Addressed today  Care Plan initiated: Yes    Upcoming Appointments:   Future Appointments       Date / Time Provider Department Dept Phone    7/3/2025 1:15 PM Silvio Salazar MD  [facility] [phone]          Last filed Vital Signs Reviewed:  BP Readings from Last 3 Encounters:   05/29/25 136/78   05/01/25 123/69   04/27/25 137/82      Labs Reviewed:  Lab Results    Component Value Date    CREATININE 0.70 05/01/2025    GLUCOSE 87 05/01/2025    ALKPHOS 38 05/01/2025    K 3.7 05/01/2025    PROT 6.0 (L) 05/01/2025     05/01/2025    AST 13 05/01/2025    ALT 10 05/01/2025    BUN 12 05/01/2025    MG 1.87 05/01/2025    PHOS 3.9 05/01/2025     Lab Results   Component Value Date    TRIG 89 04/29/2025    CHOL 107 04/29/2025    LDLCALC 46 04/29/2025    HDL 43.7 04/29/2025     Lab Results   Component Value Date    HGBA1C 4.8 04/29/2025    HGBA1C 5.4 08/29/2023    HGBA1C 5.1 07/08/2023     Lab Results   Component Value Date    WBC 8.7 05/01/2025    RBC 3.71 (L) 05/01/2025    HGB 12.6 (L) 05/01/2025     (L) 05/01/2025   No other concerns at this time.  Agreeable to monthly outreaches.  Encouraged to call if questions or concerns arise.    Jackie Roth

## 2025-07-03 ENCOUNTER — APPOINTMENT (OUTPATIENT)
Dept: CARDIOLOGY | Facility: CLINIC | Age: 65
End: 2025-07-03
Payer: MEDICAID

## 2025-07-03 VITALS
DIASTOLIC BLOOD PRESSURE: 56 MMHG | HEART RATE: 98 BPM | SYSTOLIC BLOOD PRESSURE: 122 MMHG | OXYGEN SATURATION: 98 % | WEIGHT: 128 LBS | RESPIRATION RATE: 16 BRPM | BODY MASS INDEX: 20.05 KG/M2

## 2025-07-03 DIAGNOSIS — I70.90 ARTERIOSCLEROTIC VASCULAR DISEASE: ICD-10-CM

## 2025-07-03 DIAGNOSIS — I48.0 PAROXYSMAL ATRIAL FIBRILLATION (MULTI): ICD-10-CM

## 2025-07-03 DIAGNOSIS — I25.10 CORONARY ARTERY DISEASE INVOLVING NATIVE CORONARY ARTERY OF NATIVE HEART WITHOUT ANGINA PECTORIS: ICD-10-CM

## 2025-07-03 DIAGNOSIS — I11.9 BENIGN HYPERTENSIVE HEART DISEASE WITHOUT CONGESTIVE HEART FAILURE: ICD-10-CM

## 2025-07-03 DIAGNOSIS — I10 PRIMARY HYPERTENSION: Primary | ICD-10-CM

## 2025-07-03 DIAGNOSIS — I10 ESSENTIAL HYPERTENSION: ICD-10-CM

## 2025-07-03 PROCEDURE — 99214 OFFICE O/P EST MOD 30 MIN: CPT | Performed by: INTERNAL MEDICINE

## 2025-07-03 PROCEDURE — 3074F SYST BP LT 130 MM HG: CPT | Performed by: INTERNAL MEDICINE

## 2025-07-03 PROCEDURE — 4004F PT TOBACCO SCREEN RCVD TLK: CPT | Performed by: INTERNAL MEDICINE

## 2025-07-03 PROCEDURE — 3078F DIAST BP <80 MM HG: CPT | Performed by: INTERNAL MEDICINE

## 2025-07-03 RX ORDER — METOPROLOL SUCCINATE 100 MG/1
100 TABLET, EXTENDED RELEASE ORAL DAILY
Qty: 90 TABLET | Refills: 3 | Status: SHIPPED | OUTPATIENT
Start: 2025-07-03 | End: 2026-07-03

## 2025-07-03 ASSESSMENT — ENCOUNTER SYMPTOMS
OCCASIONAL FEELINGS OF UNSTEADINESS: 0
LOSS OF SENSATION IN FEET: 0
DEPRESSION: 0

## 2025-07-03 ASSESSMENT — LIFESTYLE VARIABLES
HAS A RELATIVE, FRIEND, DOCTOR, OR ANOTHER HEALTH PROFESSIONAL EXPRESSED CONCERN ABOUT YOUR DRINKING OR SUGGESTED YOU CUT DOWN: NO
HOW OFTEN DO YOU HAVE A DRINK CONTAINING ALCOHOL: 2-3 TIMES A WEEK
SKIP TO QUESTIONS 9-10: 1
HOW MANY STANDARD DRINKS CONTAINING ALCOHOL DO YOU HAVE ON A TYPICAL DAY: 1 OR 2
HOW OFTEN DO YOU HAVE SIX OR MORE DRINKS ON ONE OCCASION: NEVER
HAVE YOU OR SOMEONE ELSE BEEN INJURED AS A RESULT OF YOUR DRINKING: NO
AUDIT-C TOTAL SCORE: 3
AUDIT TOTAL SCORE: 3

## 2025-07-03 ASSESSMENT — PAIN SCALES - GENERAL: PAINLEVEL_OUTOF10: 0-NO PAIN

## 2025-07-03 NOTE — PROGRESS NOTES
[facility]    Interventional Cardiology    History of present illness:  Patient returns to my office follow-up after MIDCAB LIMA to LAD DILLON to circumflex.  Had PCI to RCA in setting of inferior STEMI back in July 7, 2023 and then his MIDCAB was in September 7, 2023.  Postop was complicated by brief episode of atrial fibrillation..  Ejection fraction of 35 to 40%.  Patient returns to my office for follow-up.  Patient was hospitalized in April 27, 2025 for stroke with diplopia and right-sided weakness.  Underwent repeated echocardiograms which showed ejection fraction 40 to 45%.  EKG showing normal sinus rhythm with ectopic P waves and nonspecific ST-T wave abnormalities.  Patient subsequently underwent a monitor on May 1, 2025.  His [employer] monitor showed few episodes of SVT at rate of 150 bpm.  He had 1 episode of nonsustained ventricular tachycardia of 11 beats.  No atrial fibrillation.  Patient feeling overall okay.  Still having atypical pains in his chest from incisional pain.  Reports shortness of breath with moderate activity.  Denies having palpitation dizziness lightheadedness or syncope.    Medical History[1]    Surgical History[2]    Allergies[3]     reports that he has been smoking cigarettes. He started smoking about 17 months ago. He has a 18.2 pack-year smoking history. He has been exposed to tobacco smoke. He has never used smokeless tobacco. He reports current alcohol use. He reports that he does not use drugs.    Family History[4]    Patient's Medications   New Prescriptions    No medications on file   Previous Medications    ASPIRIN 81 MG EC TABLET    Take 1 tablet (81 mg) by mouth once daily.    ATORVASTATIN (LIPITOR) 80 MG TABLET    Take 1 tablet (80 mg) by mouth once daily at bedtime.    ATORVASTATIN (LIPITOR) 80 MG TABLET    Take 1 tablet (80 mg) by mouth once daily at bedtime.    CLOPIDOGREL (PLAVIX) 75 MG TABLET    Take 1 tablet (75 mg)  by mouth once daily.    EZETIMIBE (ZETIA) 10 MG TABLET    Take 1 tablet (10 mg) by mouth once daily.    EZETIMIBE (ZETIA) 10 MG TABLET    Take 1 tablet (10 mg) by mouth once daily.    LOSARTAN (COZAAR) 25 MG TABLET    Take 1 tablet (25 mg) by mouth once daily.    METOPROLOL SUCCINATE XL (TOPROL-XL) 50 MG 24 HR TABLET    Take 1 tablet (50 mg) by mouth once daily. Hold for systolic blood pressure less than 100 or heart rate less than 60    METOPROLOL SUCCINATE XL (TOPROL-XL) 50 MG 24 HR TABLET    Take 1 tablet (50 mg) by mouth once daily. Hold for systolic blood pressure less than 100 mmhg  or heart rate does not 60.    NITROGLYCERIN (NITROSTAT) 0.4 MG SL TABLET    Take 1 tablet by mouth sublingual as needed for chest pain give up to 3 doses hold of sbp is less than 90. Notify physician.   Modified Medications    No medications on file   Discontinued Medications    No medications on file       Objective   Physical Exam  General: Patient in no acute distress   HEENT: Atraumatic normocephalic.  Neck: Supple, jugular venous pressure within normal limit.  No bruits  Lungs: Clear to auscultation bilaterally  Cardiovascular: Regular rate and rhythm, normal heart sounds, no murmurs rubs or gallops  Abdomen: Soft nontender nondistended.  Normal bowel sounds.  Extremities: Warm to touch, no edema.    Lab Review   No visits with results within 2 Month(s) from this visit.   Latest known visit with results is:   No results displayed because visit has over 200 results.           Assessment/Plan   Problem List[5]     Patient returns to my office follow-up after MIDCAB LIMA to LAD DILLON to circumflex.  Had PCI to RCA in setting of inferior STEMI back in July 7, 2023 and then his MIDCAB was in September 7, 2023.  Postop was complicated by brief episode of atrial fibrillation..  Ejection fraction of 35 to 40%.  Patient returns to my office for follow-up.  Patient was hospitalized in April 27, 2025 for stroke with diplopia and  right-sided weakness.  Underwent repeated echocardiograms which showed ejection fraction 40 to 45%.  EKG showing normal sinus rhythm with ectopic P waves and nonspecific ST-T wave abnormalities.  Patient subsequently underwent a monitor on May 1, 2025.  His [employer] monitor showed few episodes of SVT at rate of 150 bpm.  He had 1 episode of nonsustained ventricular tachycardia of 11 beats.  No atrial fibrillation.  Patient feeling overall okay.  Still having atypical pains in his chest from incisional pain.  Reports shortness of breath with moderate activity.  Denies having palpitation dizziness lightheadedness or syncope.    At this point patient ejection fraction improved.  He was variably compliant with his medications in the past.  I do recommend him to be on aspirin clopidogrel for his recent stroke.  I will refer him to see Dr. Silva for episodes of nonsustained ventricular tachycardia as well as SVTs that he had on his [employer] monitor.  Will increase metoprolol to 100 mg oral daily for better rate control and for cardiomyopathy.  Will continue current statin.  Has been missing a lot of his ezetimibe and atorvastatin doses so we will switch his medications to the morning to take it in the morning and this way he will get 80 to 90% of the benefit.  Will follow-up in 4 months.  Discussed with patient lifestyle modification.      Silvio Salazar MD              [1]   Past Medical History:  Diagnosis Date    Acute myocardial infarction 07/07/2023    Alcohol dependence 10/30/2023    Atrial fibrillation (Multi) 10/30/2023    Benign hypertensive heart disease without congestive heart failure 09/04/2023    Congestive heart failure 09/30/2023    Coronary arteriosclerosis in native artery 07/07/2023    Coronary artery disease     Essential hypertension 09/30/2023    High blood cholesterol     HTN (hypertension)     Hyperlipidemia 09/04/2023    Impaired perfusion of peripheral tissue 10/30/2023    MI (myocardial  infarction) (Multi) 2010    stents placed by Dr. Davila    MI (myocardial infarction) (Multi)     stents x2 by Dr. Salazar    Postsurgical percutaneous transluminal coronary angioplasty status 09/04/2023    Stenosis of coronary artery stent 07/07/2023    Stricture of artery 08/29/2023    Tobacco dependence 09/04/2023    VT (ventricular tachycardia) (Multi) 07/07/2023   [2]   Past Surgical History:  Procedure Laterality Date    CORONARY ANGIOPLASTY WITH STENT PLACEMENT      Dr Davila - 2 stents    CORONARY ANGIOPLASTY WITH STENT PLACEMENT      Dr Salazar - 2 stents    CORONARY ARTERY BYPASS GRAFT      MINI cabg x2    ORTHOPEDIC SURGERY      right ankle repair    ORTHOPEDIC SURGERY      right arm repair   [3] No Known Allergies  [4]   Family History  Problem Relation Name Age of Onset    Other (old age) Mother      Other (cabg x3) Father      Prostate cancer Father     [5]   Patient Active Problem List  Diagnosis    Benign hypertensive heart disease without congestive heart failure    Arteriosclerotic vascular disease    Hyperlipidemia    Postsurgical percutaneous transluminal coronary angioplasty status    Tobacco dependence    Chest pain    Congestive heart failure    Coronary artery disease    Essential hypertension    Hyperlipidemia    CAD (coronary artery disease)    Impaired perfusion of peripheral tissue    Stenosis of coronary stent    Stricture of artery    Injury of back    Ventricular tachycardia, unspecified (Multi)    Acute low back pain    Acute myocardial infarction    Alcohol dependence    Atrial fibrillation (Multi)    Chronic systolic heart failure    Clouded consciousness    Coronary angioplasty status    Fall    Fracture of rib    Tobacco use    Laceration of lower extremity    Abdominal pain    Abnormal x-ray examination    Unresponsive    Respiratory failure    Altered mental status    AMS (altered mental status)

## 2025-07-03 NOTE — PATIENT INSTRUCTIONS
Increase metoprolol to 100 mg 1 tablet daily.    Take the atorvastatin and ezetimibe in the morning you do not have to take them at night.    Continue all other medications including clopidogrel aspirin and losartan.    I will see you in 4 months.    I will send you to see Dr. Lopez for the result of your monitor.

## 2025-07-11 ENCOUNTER — PATIENT OUTREACH (OUTPATIENT)
Dept: PRIMARY CARE | Facility: CLINIC | Age: 65
End: 2025-07-11
Payer: MEDICAID

## 2025-07-11 ENCOUNTER — PHARMACY VISIT (OUTPATIENT)
Dept: PHARMACY | Facility: CLINIC | Age: 65
End: 2025-07-11
Payer: MEDICAID

## 2025-07-11 DIAGNOSIS — I10 PRIMARY HYPERTENSION: ICD-10-CM

## 2025-07-11 DIAGNOSIS — I25.10 CORONARY ARTERY DISEASE INVOLVING NATIVE CORONARY ARTERY OF NATIVE HEART WITHOUT ANGINA PECTORIS: ICD-10-CM

## 2025-07-11 DIAGNOSIS — E78.2 MIXED HYPERLIPIDEMIA: ICD-10-CM

## 2025-07-11 DIAGNOSIS — I63.432 CEREBRAL INFARCTION DUE TO EMBOLISM OF LEFT POSTERIOR CEREBRAL ARTERY (MULTI): ICD-10-CM

## 2025-07-11 PROCEDURE — RXMED WILLOW AMBULATORY MEDICATION CHARGE

## 2025-07-11 RX ORDER — ASPIRIN 81 MG/1
81 TABLET ORAL DAILY
Qty: 30 TABLET | Refills: 0 | Status: CANCELLED | OUTPATIENT
Start: 2025-07-11

## 2025-07-11 NOTE — PROGRESS NOTES
Care Management Monthly Outreach  Chart review completed  Confirmation of at least 2 patient identifiers  Change in insurance? No    Has patient been to ER/Urgent Care since last outreach? 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 (Multi)    Coronary artery disease involving native coronary artery of native heart without angina pectoris    I spoke to Mateo today for monthly outreach  Mateo shares that Medicaid tried to cancel his insurance this month but he went down there and was able keep it for this month  Mateo will start to get his disability checks in August and will be over income for Medicaid ($2100/ mo $25,200 annually)   I encouraged Mateo to call today so there are not any gaps in coverage  I looked into possible extra help but he is over income for that too  I gave Mateo the number to [employer] [phone] to locate an affordable health plan and Part D coverage  Pts receiving disability need to be on it for 2 years before they can receive Medicare   Presently Mateo has all of his medication.  He had his visit with Dr Salazar with improved EF 2023 35-40 -> 40-45. The heart monitor showed a few episodes of SVT and 1 nonsustained episode of vtach and referred to Dr Messina [phone].  Metoprolol was increased to 100 daily. Mateo is taking the increased dose of metoprolol but was unaware of the referral.  I gave him name/office number he will call on Monday.  I also advised whatever health plan he signs up for to makes sure his providers are on the plan.      IDENTIFIED ISSUES/CONCERNS:  IP 4/27-51 SMALL NON-HEMORRHAGIC CVA WAS FOUND UNRESPONSIVE, INTUBATED  UNINSURED     PLAN:  [X] Apply for disability and medicaid  6/24/25 approved disability  now on [facility]  [  ] Dr Salazar 7/3 1:15  [  ] ? Visual rehab    Medications:   Are there medication changes since last visit? No  Refills needed? No    Social Drivers of Health: Deferred  Care Gaps Addressed? Deferred  Care Plan addressed: Yes    Upcoming Appointments:   Future Appointments       Date / Time Provider Department Dept Phone    7/23/2025 1:20 PM Raya Marion MD [facility] [phone]    8/5/2025 1:30 PM (Arrive by 1:15 PM) Asaf Teixeira MD [facility] [phone]    11/6/2025 3:15 PM Silvio Salazar MD  [facility] [phone]          Blood Pressures Reviewed  BP Readings from Last 3 Encounters:   07/03/25 122/56   05/29/25 136/78   05/01/25 123/69     Labs Reviewed:  Lab Results   Component Value Date    CREATININE 0.70 05/01/2025    GLUCOSE 87 05/01/2025    ALKPHOS 38 05/01/2025    K 3.7 05/01/2025    PROT 6.0 (L) 05/01/2025     05/01/2025    CALCIUM 8.9 05/01/2025    AST 13 05/01/2025    ALT 10 05/01/2025    BUN 12 05/01/2025    MG 1.87 05/01/2025    PHOS 3.9 05/01/2025     Lab Results   Component Value Date    TRIG 89 04/29/2025    CHOL 107 04/29/2025    LDLCALC 46 04/29/2025    HDL 43.7 04/29/2025     Lab Results   Component Value Date    HGBA1C 4.8 04/29/2025    HGBA1C 5.4 08/29/2023    HGBA1C 5.1 07/08/2023     Lab Results   Component Value Date    WBC 8.7 05/01/2025    RBC 3.71 (L) 05/01/2025    HGB 12.6 (L) 05/01/2025     (L) 05/01/2025   No other concerns at this time.  Agreeable to continue monthly outreaches.  Encouraged to call if questions or concerns arise.    Jackie Roth

## 2025-07-12 DIAGNOSIS — I63.9 CEREBROVASCULAR ACCIDENT (CVA), UNSPECIFIED MECHANISM (MULTI): Primary | ICD-10-CM

## 2025-07-14 RX ORDER — ASPIRIN 81 MG/1
81 TABLET ORAL DAILY
Qty: 30 TABLET | Refills: 0 | Status: SHIPPED | OUTPATIENT
Start: 2025-07-14

## 2025-07-16 ENCOUNTER — APPOINTMENT (OUTPATIENT)
Dept: CARDIOLOGY | Facility: CLINIC | Age: 65
End: 2025-07-16
Payer: MEDICAID

## 2025-07-23 ENCOUNTER — OFFICE VISIT (OUTPATIENT)
Facility: CLINIC | Age: 65
End: 2025-07-23
Payer: COMMERCIAL

## 2025-07-23 VITALS — HEART RATE: 65 BPM | SYSTOLIC BLOOD PRESSURE: 124 MMHG | DIASTOLIC BLOOD PRESSURE: 78 MMHG

## 2025-07-23 DIAGNOSIS — I47.29 NONSUSTAINED VENTRICULAR TACHYCARDIA (MULTI): ICD-10-CM

## 2025-07-23 DIAGNOSIS — I47.10 NONSUSTAINED PAROXYSMAL SUPRAVENTRICULAR TACHYCARDIA: Primary | ICD-10-CM

## 2025-07-23 LAB
ATRIAL RATE: 65 BPM
P AXIS: 68 DEGREES
P OFFSET: 194 MS
P ONSET: 152 MS
PR INTERVAL: 130 MS
Q ONSET: 217 MS
QRS COUNT: 11 BEATS
QRS DURATION: 114 MS
QT INTERVAL: 420 MS
QTC CALCULATION(BAZETT): 436 MS
QTC FREDERICIA: 431 MS
R AXIS: 82 DEGREES
T AXIS: -50 DEGREES
T OFFSET: 427 MS
VENTRICULAR RATE: 65 BPM

## 2025-07-23 PROCEDURE — 3078F DIAST BP <80 MM HG: CPT | Performed by: STUDENT IN AN ORGANIZED HEALTH CARE EDUCATION/TRAINING PROGRAM

## 2025-07-23 PROCEDURE — 93005 ELECTROCARDIOGRAM TRACING: CPT | Performed by: STUDENT IN AN ORGANIZED HEALTH CARE EDUCATION/TRAINING PROGRAM

## 2025-07-23 PROCEDURE — 99212 OFFICE O/P EST SF 10 MIN: CPT | Performed by: STUDENT IN AN ORGANIZED HEALTH CARE EDUCATION/TRAINING PROGRAM

## 2025-07-23 PROCEDURE — 99204 OFFICE O/P NEW MOD 45 MIN: CPT | Performed by: STUDENT IN AN ORGANIZED HEALTH CARE EDUCATION/TRAINING PROGRAM

## 2025-07-23 PROCEDURE — 3074F SYST BP LT 130 MM HG: CPT | Performed by: STUDENT IN AN ORGANIZED HEALTH CARE EDUCATION/TRAINING PROGRAM

## 2025-07-23 ASSESSMENT — ENCOUNTER SYMPTOMS
DEPRESSION: 0
LOSS OF SENSATION IN FEET: 0
OCCASIONAL FEELINGS OF UNSTEADINESS: 1

## 2025-07-23 ASSESSMENT — COLUMBIA-SUICIDE SEVERITY RATING SCALE - C-SSRS
2. HAVE YOU ACTUALLY HAD ANY THOUGHTS OF KILLING YOURSELF?: NO
6. HAVE YOU EVER DONE ANYTHING, STARTED TO DO ANYTHING, OR PREPARED TO DO ANYTHING TO END YOUR LIFE?: NO
1. IN THE PAST MONTH, HAVE YOU WISHED YOU WERE DEAD OR WISHED YOU COULD GO TO SLEEP AND NOT WAKE UP?: NO

## 2025-07-23 ASSESSMENT — PATIENT HEALTH QUESTIONNAIRE - PHQ9
1. LITTLE INTEREST OR PLEASURE IN DOING THINGS: NOT AT ALL
SUM OF ALL RESPONSES TO PHQ9 QUESTIONS 1 AND 2: 0
2. FEELING DOWN, DEPRESSED OR HOPELESS: NOT AT ALL

## 2025-07-23 ASSESSMENT — PAIN SCALES - GENERAL: PAINLEVEL_OUTOF10: 0-NO PAIN

## 2025-07-23 NOTE — PROGRESS NOTES
[facility]    Patient Name: Mateo Woodall  Patient : 1960    Referred for  SVT/NSVT    History of Present Illness:  Mateo Woodall is a 64 y.o. year old male patient with:    CAD S/p MIDCAB LIMA to the LAD and DILLON to circumflex 2023  Stemi with PCI to RCA 2023  Afib post op complication   Stroke with diplopia and right sided weakness 2025  SVT/NSVT 2025 on holter monitor  Tobacco abuse    Patient referred by Dr Salazar. He was recently hospitalized with CVA.  He had a Holter monitor placed after discharge that showed no episodes of atrial fibrillation or atrial flutter but did show episodes of nonsustained VT with 18 beats and episodes of nonsustained SVT lasting up to 11 beats.  PVC burden was less than 1%.  He is currently on metoprolol, the dose was just increased by Dr. Salazar.  He does have a history of postop A-fib after his CABG in . Echo from 2025 showed echocardiogram which showed ejection fraction 40 to 45%. His EKG today shows sinus rhythm with ST-T wave abnormalities, PVC, heart rate 65 bpm.    Past Medical History:  He has a past medical history of Acute myocardial infarction (2023), Alcohol dependence (10/30/2023), Atrial fibrillation (Multi) (10/30/2023), Benign hypertensive heart disease without congestive heart failure (2023), Congestive heart failure (2023), Coronary arteriosclerosis in native artery (2023), Coronary artery disease, Essential hypertension (2023), High blood cholesterol, HTN (hypertension), Hyperlipidemia (2023), Impaired perfusion of peripheral tissue (10/30/2023), MI (myocardial infarction) (Multi) (), MI (myocardial infarction) (Multi), Postsurgical percutaneous transluminal coronary angioplasty status (2023), Stenosis of coronary artery stent (2023), Stricture of artery (2023), Tobacco dependence (2023), and VT (ventricular tachycardia)  (Multi) (07/07/2023).    Past Surgical History:  He has a past surgical history that includes Coronary artery bypass graft; orthopedic surgery; orthopedic surgery; Coronary angioplasty with stent; and Coronary angioplasty with stent.      Social History:  He reports that he has been smoking cigarettes. He started smoking about 17 months ago. He has a 18.2 pack-year smoking history. He has been exposed to tobacco smoke. He has never used smokeless tobacco. He reports current alcohol use. He reports that he does not use drugs.    Family History:  Family History[1]     Allergies:  Patient has no known allergies.    Outpatient Medications:  Current Outpatient Medications   Medication Instructions    aspirin 81 mg, oral, Daily    atorvastatin (LIPITOR) 80 mg, oral, Nightly    atorvastatin (LIPITOR) 80 mg, oral, Nightly    clopidogrel (PLAVIX) 75 mg, oral, Daily    ezetimibe (ZETIA) 10 mg, oral, Daily    ezetimibe (ZETIA) 10 mg, oral, Daily    losartan (COZAAR) 25 mg, oral, Daily    metoprolol succinate XL (TOPROL-XL) 50 mg, oral, Daily, Hold for systolic blood pressure less than 100 mmhg  or heart rate does not 60.    metoprolol succinate XL (TOPROL-XL) 100 mg, oral, Daily, Hold for systolic blood pressure less than 100 or heart rate less than 60    nitroglycerin (Nitrostat) 0.4 mg SL tablet Take 1 tablet by mouth sublingual as needed for chest pain give up to 3 doses hold of sbp is less than 90. Notify physician.        ROS:  A 14 point review of systems was done and is negative other than as stated in HPI    Physical Exam  Constitutional:       Appearance: Normal appearance.     Cardiovascular:      Rate and Rhythm: Normal rate and regular rhythm.      Heart sounds: No murmur heard.     No friction rub. No gallop.   Pulmonary:      Effort: Pulmonary effort is normal.      Breath sounds: Normal breath sounds.   Abdominal:      Palpations: Abdomen is soft.     Musculoskeletal:      Cervical back: Neck supple.      Neurological:      Mental Status: He is alert.     Psychiatric:         Mood and Affect: Mood normal.         Behavior: Behavior normal.         Vitals:  There were no vitals taken for this visit.       Labs:   CBC  Lab Results   Component Value Date    WBC 8.7 05/01/2025    HGB 12.6 (L) 05/01/2025    HCT 36.6 (L) 05/01/2025    MCV 99 05/01/2025     (L) 05/01/2025        Renal Function Panel  Lab Results   Component Value Date    GLUCOSE 87 05/01/2025     05/01/2025    K 3.7 05/01/2025     (H) 05/01/2025    CO2 27 05/01/2025    ANIONGAP 9 (L) 05/01/2025    BUN 12 05/01/2025    CREATININE 0.70 05/01/2025    CALCIUM 8.9 05/01/2025        CMP  Lab Results   Component Value Date    CALCIUM 8.9 05/01/2025    PHOS 3.9 05/01/2025    PROT 6.0 (L) 05/01/2025    ALBUMIN 3.3 (L) 05/01/2025    AST 13 05/01/2025    ALT 10 05/01/2025    ALKPHOS 38 05/01/2025    BILITOT 0.3 05/01/2025       TSH  Lab Results   Component Value Date    TSH 1.56 04/27/2025          Cardiac Testing:  ECG  07/23/2025  sinus rhythm with ST-T wave abnormalities, PVC, heart rate 65 bpm.    Echocardiogram  04/29/2025  PHYSICIAN INTERPRETATION:  Left Ventricle: The left ventricular systolic function is mildly decreased, with a visually estimated ejection fraction of 40-45%. There is global hypokinesis of the left ventricle with minor regional variations. The left ventricular cavity size is normal. There is normal septal and normal posterior left ventricular wall thickness. Left ventricular diastolic filling was indeterminate.  Left Atrium: The left atrial size is normal.  Right Ventricle: The right ventricle is normal in size. There is normal right ventricular global systolic function.  Right Atrium: The right atrial size is normal.  Aortic Valve: The aortic valve is trileaflet. The aortic valve dimensionless index is 0.65. There is no evidence of aortic valve regurgitation. The peak instantaneous gradient of the aortic valve is 4  mmHg. The mean gradient of the aortic valve is 2 mmHg.  Mitral Valve: The mitral valve is normal in structure. There is no evidence of mitral valve regurgitation.  Tricuspid Valve: The tricuspid valve is structurally normal. No evidence of tricuspid regurgitation.  Pulmonic Valve: The pulmonic valve is not well visualized. The pulmonic valve regurgitation was not well visualized.  Pericardium: No pericardial effusion noted.  Aorta: The aortic root is normal.        CONCLUSIONS:   1. The left ventricular systolic function is mildly decreased, with a visually estimated ejection fraction of 40-45%.   2. There is global hypokinesis of the left ventricle with minor regional variations.   3. Left ventricular diastolic filling was indeterminate.   4. There is normal right ventricular global systolic function.   5. No intracardiac thrombus or mass visualized.    Holter Monitor  05/01/2025-05/15/2025  * The predominant rhythm was Sinus. * The Maximum Heart Rate recorded was 172 bpm, 05/ 02 14: 42: 56, the Minimum Heart Rate recorded was 40 bpm, 05/ 12 04: 36: 56, and the Average Heart Rate was 79 bpm. * There were 2, 186 VE beats with a burden of < 1 % . There was 1 occurrence of Ventricular Tachycardia with the Fastest episode 172 bpm, 05/ 02 14: 42: 56, and the Longest episode 18 beats, 05/ 02 14: 42: 56. * There were 2, 017 SVE beats with a burden of < 1 % . There were 10 occurrences of Supraventricular Tachycardia with the Fastest episode 167 bpm, 05/ 06 12: 23: 26, and the Longest episode 11 beats, 05/ 06 05: 43: 20. * Other rhythms in this study include: Ventricular Run. * There were 0 Patient Triggers.    Assessment:     Patient referred by Dr Salazar. He was recently hospitalized with CVA.  He had a Holter monitor placed after discharge that showed no episodes of atrial fibrillation or atrial flutter but did show episodes of nonsustained VT with 18 beats and episodes of nonsustained SVT lasting up to 11 beats.  PVC  burden was less than 1%.  He is currently on metoprolol, the dose was just increased by Dr. Salazar.  He does have a history of postop A-fib after his CABG in 2023. Echo from 04/2025 showed echocardiogram which showed ejection fraction 40 to 45%. His EKG today shows sinus rhythm with ST-T wave abnormalities, PVC, heart rate 65 bpm.    Plan:  I had a long conversation with the patient and his wife about the findings.  I explained to them that a loop recorder would be useful for the detection of different arrhythmias including any episodes of atrial fibrillation, atrial flutter or VT.  The patient declined stating that he is having problems with his insurance.  He is currently on metoprolol.       [1]   Family History  Problem Relation Name Age of Onset    Other (old age) Mother      Other (cabg x3) Father      Prostate cancer Father

## 2025-07-24 LAB — BODY SURFACE AREA: 1.72 M2

## 2025-07-24 PROCEDURE — 93248 EXT ECG>7D<15D REV&INTERPJ: CPT | Performed by: INTERNAL MEDICINE

## 2025-08-05 ENCOUNTER — APPOINTMENT (OUTPATIENT)
Dept: NEUROSURGERY | Facility: CLINIC | Age: 65
End: 2025-08-05
Payer: MEDICAID

## 2025-08-18 ENCOUNTER — PATIENT OUTREACH (OUTPATIENT)
Dept: PRIMARY CARE | Facility: CLINIC | Age: 65
End: 2025-08-18

## 2025-11-06 ENCOUNTER — APPOINTMENT (OUTPATIENT)
Dept: CARDIOLOGY | Facility: CLINIC | Age: 65
End: 2025-11-06
Payer: MEDICAID